# Patient Record
Sex: MALE | Race: WHITE | NOT HISPANIC OR LATINO | Employment: OTHER | URBAN - NONMETROPOLITAN AREA
[De-identification: names, ages, dates, MRNs, and addresses within clinical notes are randomized per-mention and may not be internally consistent; named-entity substitution may affect disease eponyms.]

---

## 2021-01-01 ENCOUNTER — HOSPITAL ENCOUNTER (OUTPATIENT)
Dept: RADIATION ONCOLOGY | Facility: HOSPITAL | Age: 61
Setting detail: RADIATION/ONCOLOGY SERIES
Discharge: HOME | End: 2021-07-02

## 2021-01-01 ENCOUNTER — HOSPITAL ENCOUNTER (OUTPATIENT)
Dept: RADIATION ONCOLOGY | Facility: HOSPITAL | Age: 61
Setting detail: RADIATION/ONCOLOGY SERIES
Discharge: HOME | End: 2021-07-09

## 2021-01-01 ENCOUNTER — TELEPHONE (OUTPATIENT)
Dept: RADIATION ONCOLOGY | Facility: HOSPITAL | Age: 61
End: 2021-01-01

## 2021-01-01 ENCOUNTER — HOSPITAL ENCOUNTER (OUTPATIENT)
Dept: RADIATION ONCOLOGY | Facility: HOSPITAL | Age: 61
Setting detail: RADIATION/ONCOLOGY SERIES
Discharge: HOME | End: 2021-07-07

## 2021-01-01 ENCOUNTER — HOSPITAL ENCOUNTER (OUTPATIENT)
Dept: RADIATION ONCOLOGY | Facility: HOSPITAL | Age: 61
Setting detail: RADIATION/ONCOLOGY SERIES
Discharge: HOME | End: 2021-07-08

## 2021-01-01 ENCOUNTER — HOSPITAL ENCOUNTER (OUTPATIENT)
Dept: RADIATION ONCOLOGY | Facility: HOSPITAL | Age: 61
Setting detail: RADIATION/ONCOLOGY SERIES
Discharge: HOME | End: 2021-06-30

## 2021-01-01 ENCOUNTER — APPOINTMENT (OUTPATIENT)
Dept: RADIATION ONCOLOGY | Facility: HOSPITAL | Age: 61
End: 2021-01-01

## 2021-01-01 ENCOUNTER — CONSULT (OUTPATIENT)
Dept: RADIATION ONCOLOGY | Facility: HOSPITAL | Age: 61
End: 2021-01-01

## 2021-01-01 ENCOUNTER — HOSPITAL ENCOUNTER (OUTPATIENT)
Dept: RADIATION ONCOLOGY | Facility: HOSPITAL | Age: 61
Setting detail: RADIATION/ONCOLOGY SERIES
Discharge: HOME | End: 2021-06-24

## 2021-01-01 ENCOUNTER — HOSPITAL ENCOUNTER (OUTPATIENT)
Dept: RADIATION ONCOLOGY | Facility: HOSPITAL | Age: 61
Setting detail: RADIATION/ONCOLOGY SERIES
Discharge: HOME | End: 2021-07-13

## 2021-01-01 ENCOUNTER — DOCUMENTATION (OUTPATIENT)
Dept: RADIATION ONCOLOGY | Facility: HOSPITAL | Age: 61
End: 2021-01-01

## 2021-01-01 ENCOUNTER — HOSPITAL ENCOUNTER (OUTPATIENT)
Dept: RADIATION ONCOLOGY | Facility: HOSPITAL | Age: 61
Setting detail: RADIATION/ONCOLOGY SERIES
Discharge: HOME | End: 2021-06-07

## 2021-01-01 ENCOUNTER — HOSPITAL ENCOUNTER (OUTPATIENT)
Dept: RADIATION ONCOLOGY | Facility: HOSPITAL | Age: 61
Setting detail: RADIATION/ONCOLOGY SERIES
Discharge: HOME | End: 2021-07-06

## 2021-01-01 ENCOUNTER — HOSPITAL ENCOUNTER (OUTPATIENT)
Dept: RADIATION ONCOLOGY | Facility: HOSPITAL | Age: 61
Setting detail: RADIATION/ONCOLOGY SERIES
End: 2021-01-01

## 2021-01-01 ENCOUNTER — HOSPITAL ENCOUNTER (OUTPATIENT)
Dept: RADIATION ONCOLOGY | Facility: HOSPITAL | Age: 61
Setting detail: RADIATION/ONCOLOGY SERIES
Discharge: HOME | End: 2021-06-29

## 2021-01-01 ENCOUNTER — HOSPITAL ENCOUNTER (OUTPATIENT)
Dept: RADIATION ONCOLOGY | Facility: HOSPITAL | Age: 61
Setting detail: RADIATION/ONCOLOGY SERIES
Discharge: HOME | End: 2021-06-28

## 2021-01-01 ENCOUNTER — HOSPITAL ENCOUNTER (OUTPATIENT)
Dept: RADIATION ONCOLOGY | Facility: HOSPITAL | Age: 61
Setting detail: RADIATION/ONCOLOGY SERIES
Discharge: HOME | End: 2021-07-14

## 2021-01-01 VITALS
OXYGEN SATURATION: 93 % | DIASTOLIC BLOOD PRESSURE: 94 MMHG | WEIGHT: 205 LBS | SYSTOLIC BLOOD PRESSURE: 141 MMHG | BODY MASS INDEX: 27.17 KG/M2 | HEIGHT: 73 IN | RESPIRATION RATE: 18 BRPM | HEART RATE: 104 BPM

## 2021-01-01 DIAGNOSIS — C18.9 PRIMARY COLON CANCER WITH METASTASIS TO OTHER SITE (HCC): ICD-10-CM

## 2021-01-01 DIAGNOSIS — R06.02 SHORTNESS OF BREATH: ICD-10-CM

## 2021-01-01 DIAGNOSIS — R04.2 HEMOPTYSIS: ICD-10-CM

## 2021-01-01 DIAGNOSIS — C78.01 SECONDARY ADENOCARCINOMA OF LUNG, RIGHT (HCC): Primary | ICD-10-CM

## 2021-01-01 DIAGNOSIS — Z79.899 ON ANTINEOPLASTIC CHEMOTHERAPY: ICD-10-CM

## 2021-01-01 PROCEDURE — 77386: CPT | Performed by: RADIOLOGY

## 2021-01-01 PROCEDURE — 77290 THER RAD SIMULAJ FIELD CPLX: CPT | Performed by: RADIOLOGY

## 2021-01-01 PROCEDURE — 77300 RADIATION THERAPY DOSE PLAN: CPT | Performed by: RADIOLOGY

## 2021-01-01 PROCEDURE — 77334 RADIATION TREATMENT AID(S): CPT | Performed by: RADIOLOGY

## 2021-01-01 PROCEDURE — 77338 DESIGN MLC DEVICE FOR IMRT: CPT | Performed by: RADIOLOGY

## 2021-01-01 PROCEDURE — 77336 RADIATION PHYSICS CONSULT: CPT | Performed by: RADIOLOGY

## 2021-01-01 PROCEDURE — 77301 RADIOTHERAPY DOSE PLAN IMRT: CPT | Performed by: RADIOLOGY

## 2021-01-01 RX ORDER — FOLIC ACID 1 MG/1
1000 TABLET ORAL DAILY
COMMUNITY
Start: 2021-01-01

## 2021-01-01 RX ORDER — PROCHLORPERAZINE MALEATE 10 MG
10 TABLET ORAL
COMMUNITY

## 2021-01-01 RX ORDER — AMOXICILLIN AND CLAVULANATE POTASSIUM 500; 125 MG/1; MG/1
1 TABLET, FILM COATED ORAL 3 TIMES DAILY
COMMUNITY
Start: 2021-01-01 | End: 2021-01-01

## 2021-01-01 RX ORDER — HYDROCODONE BITARTRATE AND ACETAMINOPHEN 5; 325 MG/1; MG/1
1 TABLET ORAL EVERY 4 HOURS PRN
COMMUNITY
Start: 2021-01-01

## 2021-01-01 RX ORDER — HYDROCHLOROTHIAZIDE 25 MG/1
1 TABLET ORAL DAILY
COMMUNITY
Start: 2021-01-01

## 2021-01-01 RX ORDER — PEN NEEDLE, DIABETIC 32GX 5/32"
NEEDLE, DISPOSABLE MISCELLANEOUS
COMMUNITY
Start: 2021-01-01

## 2021-01-01 RX ORDER — INSULIN GLARGINE 100 [IU]/ML
32 INJECTION, SOLUTION SUBCUTANEOUS DAILY
COMMUNITY
Start: 2021-01-01

## 2021-01-01 RX ORDER — ALBUTEROL SULFATE 90 UG/1
2 AEROSOL, METERED RESPIRATORY (INHALATION)
COMMUNITY
Start: 2021-01-01

## 2021-01-01 RX ORDER — IPRATROPIUM BROMIDE AND ALBUTEROL SULFATE 2.5; .5 MG/3ML; MG/3ML
3 SOLUTION RESPIRATORY (INHALATION) EVERY 4 HOURS PRN
COMMUNITY
Start: 2021-01-01 | End: 2021-01-01

## 2021-05-30 ENCOUNTER — APPOINTMENT (OUTPATIENT)
Dept: GENERAL RADIOLOGY | Age: 61
DRG: 194 | End: 2021-05-30
Payer: MEDICARE

## 2021-05-30 ENCOUNTER — APPOINTMENT (OUTPATIENT)
Dept: CT IMAGING | Age: 61
DRG: 194 | End: 2021-05-30
Payer: MEDICARE

## 2021-05-30 ENCOUNTER — HOSPITAL ENCOUNTER (INPATIENT)
Age: 61
LOS: 4 days | Discharge: HOME HEALTH | DRG: 194 | End: 2021-06-03
Attending: EMERGENCY MEDICINE | Admitting: STUDENT IN AN ORGANIZED HEALTH CARE EDUCATION/TRAINING PROGRAM
Payer: MEDICARE

## 2021-05-30 DIAGNOSIS — J18.9 PNEUMONIA DUE TO ORGANISM: ICD-10-CM

## 2021-05-30 DIAGNOSIS — C34.90 MALIGNANT NEOPLASM OF LUNG, UNSPECIFIED LATERALITY, UNSPECIFIED PART OF LUNG (HCC): ICD-10-CM

## 2021-05-30 DIAGNOSIS — C18.9 PRIMARY COLON CANCER WITH METASTASIS TO OTHER SITE (HCC): Primary | ICD-10-CM

## 2021-05-30 DIAGNOSIS — Z51.5 PALLIATIVE CARE PATIENT: ICD-10-CM

## 2021-05-30 PROBLEM — E11.9 DIABETES (HCC): Status: ACTIVE | Noted: 2021-05-30

## 2021-05-30 PROBLEM — Z91.89 COMMUNITY ACQUIRED PNEUMONIA WITH RISK FACTOR FOR DRUG-RESISTANT ORGANISM: Status: ACTIVE | Noted: 2021-05-30

## 2021-05-30 PROBLEM — C78.00 COLON CANCER METASTASIZED TO LUNG (HCC): Status: ACTIVE | Noted: 2021-05-30

## 2021-05-30 PROBLEM — T17.908S POST-OBSTRUCTIVE PNEUMONIA DUE TO FOREIGN BODY ASPIRATION: Status: ACTIVE | Noted: 2021-05-30

## 2021-05-30 PROBLEM — J16.0 COMMUNITY ACQUIRED PNEUMONIA DUE TO CHLAMYDIA SPECIES: Status: ACTIVE | Noted: 2021-05-30

## 2021-05-30 LAB
ADENOVIRUS BY PCR: NOT DETECTED
ALBUMIN SERPL-MCNC: 3.7 G/DL (ref 3.5–5.2)
ALP BLD-CCNC: 114 U/L (ref 40–130)
ALT SERPL-CCNC: 8 U/L (ref 5–41)
ANION GAP SERPL CALCULATED.3IONS-SCNC: 11 MMOL/L (ref 7–19)
AST SERPL-CCNC: 10 U/L (ref 5–40)
BASE EXCESS ARTERIAL: -0.5 MMOL/L (ref -2–2)
BASOPHILS ABSOLUTE: 0 K/UL (ref 0–0.2)
BASOPHILS RELATIVE PERCENT: 0.4 % (ref 0–1)
BILIRUB SERPL-MCNC: 1 MG/DL (ref 0.2–1.2)
BORDETELLA PARAPERTUSSIS BY PCR: NOT DETECTED
BORDETELLA PERTUSSIS BY PCR: NOT DETECTED
BUN BLDV-MCNC: 8 MG/DL (ref 6–20)
CALCIUM SERPL-MCNC: 8.9 MG/DL (ref 8.6–10)
CARBOXYHEMOGLOBIN ARTERIAL: 1.9 % (ref 0–5)
CHLAMYDOPHILIA PNEUMONIAE BY PCR: NOT DETECTED
CHLORIDE BLD-SCNC: 105 MMOL/L (ref 98–111)
CO2: 24 MMOL/L (ref 22–29)
CORONAVIRUS 229E BY PCR: NOT DETECTED
CORONAVIRUS HKU1 BY PCR: NOT DETECTED
CORONAVIRUS NL63 BY PCR: NOT DETECTED
CORONAVIRUS OC43 BY PCR: NOT DETECTED
CREAT SERPL-MCNC: 0.9 MG/DL (ref 0.5–1.2)
EOSINOPHILS ABSOLUTE: 0.1 K/UL (ref 0–0.6)
EOSINOPHILS RELATIVE PERCENT: 1.3 % (ref 0–5)
GFR AFRICAN AMERICAN: >59
GFR NON-AFRICAN AMERICAN: >60
GLUCOSE BLD-MCNC: 114 MG/DL (ref 74–109)
GLUCOSE BLD-MCNC: 142 MG/DL (ref 70–99)
HCO3 ARTERIAL: 23.2 MMOL/L (ref 22–26)
HCT VFR BLD CALC: 50.8 % (ref 42–52)
HEMOGLOBIN, ART, EXTENDED: 17.5 G/DL (ref 14–18)
HEMOGLOBIN: 16.8 G/DL (ref 14–18)
HUMAN METAPNEUMOVIRUS BY PCR: NOT DETECTED
HUMAN RHINOVIRUS/ENTEROVIRUS BY PCR: NOT DETECTED
IMMATURE GRANULOCYTES #: 0 K/UL
INFLUENZA A BY PCR: NOT DETECTED
INFLUENZA B BY PCR: NOT DETECTED
LACTIC ACID: 1.4 MMOL/L (ref 0.5–1.9)
LYMPHOCYTES ABSOLUTE: 0.4 K/UL (ref 1.1–4.5)
LYMPHOCYTES RELATIVE PERCENT: 7 % (ref 20–40)
MCH RBC QN AUTO: 29.2 PG (ref 27–31)
MCHC RBC AUTO-ENTMCNC: 33.1 G/DL (ref 33–37)
MCV RBC AUTO: 88.2 FL (ref 80–94)
METHEMOGLOBIN ARTERIAL: 0.9 %
MONOCYTES ABSOLUTE: 0.7 K/UL (ref 0–0.9)
MONOCYTES RELATIVE PERCENT: 12.1 % (ref 0–10)
MYCOPLASMA PNEUMONIAE BY PCR: NOT DETECTED
NEUTROPHILS ABSOLUTE: 4.4 K/UL (ref 1.5–7.5)
NEUTROPHILS RELATIVE PERCENT: 78.7 % (ref 50–65)
O2 CONTENT ARTERIAL: 22.4 ML/DL
O2 SAT, ARTERIAL: 91.4 %
O2 THERAPY: ABNORMAL
PARAINFLUENZA VIRUS 1 BY PCR: NOT DETECTED
PARAINFLUENZA VIRUS 2 BY PCR: NOT DETECTED
PARAINFLUENZA VIRUS 3 BY PCR: NOT DETECTED
PARAINFLUENZA VIRUS 4 BY PCR: NOT DETECTED
PCO2 ARTERIAL: 35 MMHG (ref 35–45)
PDW BLD-RTO: 18.2 % (ref 11.5–14.5)
PERFORMED ON: ABNORMAL
PH ARTERIAL: 7.43 (ref 7.35–7.45)
PLATELET # BLD: 183 K/UL (ref 130–400)
PMV BLD AUTO: 9.3 FL (ref 9.4–12.4)
PO2 ARTERIAL: 63 MMHG (ref 80–100)
POTASSIUM SERPL-SCNC: 3.7 MMOL/L (ref 3.5–5)
POTASSIUM, WHOLE BLOOD: 3.6
PRO-BNP: 813 PG/ML (ref 0–900)
RBC # BLD: 5.76 M/UL (ref 4.7–6.1)
RESPIRATORY SYNCYTIAL VIRUS BY PCR: NOT DETECTED
SARS-COV-2, PCR: NOT DETECTED
SODIUM BLD-SCNC: 140 MMOL/L (ref 136–145)
TOTAL PROTEIN: 6.5 G/DL (ref 6.6–8.7)
TROPONIN: <0.01 NG/ML (ref 0–0.03)
WBC # BLD: 5.6 K/UL (ref 4.8–10.8)

## 2021-05-30 PROCEDURE — 2580000003 HC RX 258: Performed by: EMERGENCY MEDICINE

## 2021-05-30 PROCEDURE — 71045 X-RAY EXAM CHEST 1 VIEW: CPT

## 2021-05-30 PROCEDURE — 85025 COMPLETE CBC W/AUTO DIFF WBC: CPT

## 2021-05-30 PROCEDURE — 71275 CT ANGIOGRAPHY CHEST: CPT

## 2021-05-30 PROCEDURE — 6370000000 HC RX 637 (ALT 250 FOR IP): Performed by: STUDENT IN AN ORGANIZED HEALTH CARE EDUCATION/TRAINING PROGRAM

## 2021-05-30 PROCEDURE — 2580000003 HC RX 258: Performed by: STUDENT IN AN ORGANIZED HEALTH CARE EDUCATION/TRAINING PROGRAM

## 2021-05-30 PROCEDURE — 0202U NFCT DS 22 TRGT SARS-COV-2: CPT

## 2021-05-30 PROCEDURE — 94640 AIRWAY INHALATION TREATMENT: CPT

## 2021-05-30 PROCEDURE — 80053 COMPREHEN METABOLIC PANEL: CPT

## 2021-05-30 PROCEDURE — 6360000002 HC RX W HCPCS: Performed by: STUDENT IN AN ORGANIZED HEALTH CARE EDUCATION/TRAINING PROGRAM

## 2021-05-30 PROCEDURE — 36600 WITHDRAWAL OF ARTERIAL BLOOD: CPT

## 2021-05-30 PROCEDURE — 6360000004 HC RX CONTRAST MEDICATION: Performed by: EMERGENCY MEDICINE

## 2021-05-30 PROCEDURE — 83605 ASSAY OF LACTIC ACID: CPT

## 2021-05-30 PROCEDURE — 87040 BLOOD CULTURE FOR BACTERIA: CPT

## 2021-05-30 PROCEDURE — 83880 ASSAY OF NATRIURETIC PEPTIDE: CPT

## 2021-05-30 PROCEDURE — 82947 ASSAY GLUCOSE BLOOD QUANT: CPT

## 2021-05-30 PROCEDURE — 82803 BLOOD GASES ANY COMBINATION: CPT

## 2021-05-30 PROCEDURE — 36415 COLL VENOUS BLD VENIPUNCTURE: CPT

## 2021-05-30 PROCEDURE — 6360000002 HC RX W HCPCS: Performed by: EMERGENCY MEDICINE

## 2021-05-30 PROCEDURE — 2700000000 HC OXYGEN THERAPY PER DAY

## 2021-05-30 PROCEDURE — 84132 ASSAY OF SERUM POTASSIUM: CPT

## 2021-05-30 PROCEDURE — 84484 ASSAY OF TROPONIN QUANT: CPT

## 2021-05-30 PROCEDURE — 1210000000 HC MED SURG R&B

## 2021-05-30 PROCEDURE — 99285 EMERGENCY DEPT VISIT HI MDM: CPT

## 2021-05-30 PROCEDURE — 93005 ELECTROCARDIOGRAM TRACING: CPT | Performed by: EMERGENCY MEDICINE

## 2021-05-30 RX ORDER — SODIUM CHLORIDE 9 MG/ML
INJECTION, SOLUTION INTRAVENOUS CONTINUOUS
Status: DISCONTINUED | OUTPATIENT
Start: 2021-05-30 | End: 2021-06-03

## 2021-05-30 RX ORDER — INSULIN GLARGINE 100 [IU]/ML
32 INJECTION, SOLUTION SUBCUTANEOUS NIGHTLY
Status: DISCONTINUED | OUTPATIENT
Start: 2021-05-30 | End: 2021-06-03 | Stop reason: HOSPADM

## 2021-05-30 RX ORDER — POTASSIUM CHLORIDE 20 MEQ/1
40 TABLET, EXTENDED RELEASE ORAL PRN
Status: DISCONTINUED | OUTPATIENT
Start: 2021-05-30 | End: 2021-06-03 | Stop reason: HOSPADM

## 2021-05-30 RX ORDER — SODIUM CHLORIDE 0.9 % (FLUSH) 0.9 %
5-40 SYRINGE (ML) INJECTION EVERY 12 HOURS SCHEDULED
Status: DISCONTINUED | OUTPATIENT
Start: 2021-05-30 | End: 2021-06-03 | Stop reason: HOSPADM

## 2021-05-30 RX ORDER — SODIUM CHLORIDE 9 MG/ML
25 INJECTION, SOLUTION INTRAVENOUS PRN
Status: DISCONTINUED | OUTPATIENT
Start: 2021-05-30 | End: 2021-06-03 | Stop reason: HOSPADM

## 2021-05-30 RX ORDER — AZITHROMYCIN 250 MG/1
500 TABLET, FILM COATED ORAL DAILY
Status: DISCONTINUED | OUTPATIENT
Start: 2021-05-31 | End: 2021-06-03 | Stop reason: HOSPADM

## 2021-05-30 RX ORDER — ACETAMINOPHEN 325 MG/1
650 TABLET ORAL EVERY 6 HOURS PRN
Status: DISCONTINUED | OUTPATIENT
Start: 2021-05-30 | End: 2021-06-03 | Stop reason: HOSPADM

## 2021-05-30 RX ORDER — SODIUM CHLORIDE 0.9 % (FLUSH) 0.9 %
5-40 SYRINGE (ML) INJECTION PRN
Status: DISCONTINUED | OUTPATIENT
Start: 2021-05-30 | End: 2021-06-03 | Stop reason: HOSPADM

## 2021-05-30 RX ORDER — ACETAMINOPHEN 650 MG/1
650 SUPPOSITORY RECTAL EVERY 6 HOURS PRN
Status: DISCONTINUED | OUTPATIENT
Start: 2021-05-30 | End: 2021-06-03 | Stop reason: HOSPADM

## 2021-05-30 RX ORDER — IPRATROPIUM BROMIDE AND ALBUTEROL SULFATE 2.5; .5 MG/3ML; MG/3ML
1 SOLUTION RESPIRATORY (INHALATION)
Status: DISCONTINUED | OUTPATIENT
Start: 2021-05-30 | End: 2021-06-03 | Stop reason: HOSPADM

## 2021-05-30 RX ORDER — POTASSIUM CHLORIDE 7.45 MG/ML
10 INJECTION INTRAVENOUS PRN
Status: DISCONTINUED | OUTPATIENT
Start: 2021-05-30 | End: 2021-06-03 | Stop reason: HOSPADM

## 2021-05-30 RX ORDER — MAGNESIUM SULFATE IN WATER 40 MG/ML
2000 INJECTION, SOLUTION INTRAVENOUS PRN
Status: DISCONTINUED | OUTPATIENT
Start: 2021-05-30 | End: 2021-06-03 | Stop reason: HOSPADM

## 2021-05-30 RX ORDER — HYDROCHLOROTHIAZIDE 25 MG/1
25 TABLET ORAL DAILY
COMMUNITY
End: 2021-09-17

## 2021-05-30 RX ORDER — PROCHLORPERAZINE MALEATE 10 MG
10 TABLET ORAL EVERY 6 HOURS PRN
COMMUNITY

## 2021-05-30 RX ORDER — ONDANSETRON 2 MG/ML
4 INJECTION INTRAMUSCULAR; INTRAVENOUS EVERY 6 HOURS PRN
Status: DISCONTINUED | OUTPATIENT
Start: 2021-05-30 | End: 2021-06-03 | Stop reason: HOSPADM

## 2021-05-30 RX ORDER — POLYETHYLENE GLYCOL 3350 17 G/17G
17 POWDER, FOR SOLUTION ORAL DAILY PRN
Status: DISCONTINUED | OUTPATIENT
Start: 2021-05-30 | End: 2021-06-03 | Stop reason: HOSPADM

## 2021-05-30 RX ORDER — PROCHLORPERAZINE MALEATE 10 MG
10 TABLET ORAL EVERY 6 HOURS PRN
Status: DISCONTINUED | OUTPATIENT
Start: 2021-05-30 | End: 2021-06-03 | Stop reason: HOSPADM

## 2021-05-30 RX ORDER — PROMETHAZINE HYDROCHLORIDE 25 MG/1
12.5 TABLET ORAL EVERY 6 HOURS PRN
Status: DISCONTINUED | OUTPATIENT
Start: 2021-05-30 | End: 2021-06-03 | Stop reason: HOSPADM

## 2021-05-30 RX ORDER — PREDNISONE 20 MG/1
TABLET ORAL
Status: ON HOLD | COMMUNITY
Start: 2021-04-29 | End: 2021-05-30

## 2021-05-30 RX ORDER — ALBUTEROL SULFATE 90 UG/1
2 AEROSOL, METERED RESPIRATORY (INHALATION) EVERY 6 HOURS PRN
COMMUNITY

## 2021-05-30 RX ADMIN — ONDANSETRON 4 MG: 2 INJECTION INTRAMUSCULAR; INTRAVENOUS at 16:25

## 2021-05-30 RX ADMIN — ACETAMINOPHEN 650 MG: 325 TABLET ORAL at 21:22

## 2021-05-30 RX ADMIN — Medication 500 MG: at 15:42

## 2021-05-30 RX ADMIN — INSULIN GLARGINE 12 UNITS: 100 INJECTION, SOLUTION SUBCUTANEOUS at 21:15

## 2021-05-30 RX ADMIN — SODIUM CHLORIDE: 9 INJECTION, SOLUTION INTRAVENOUS at 16:40

## 2021-05-30 RX ADMIN — SODIUM CHLORIDE, PRESERVATIVE FREE 10 ML: 5 INJECTION INTRAVENOUS at 21:19

## 2021-05-30 RX ADMIN — IOPAMIDOL 90 ML: 755 INJECTION, SOLUTION INTRAVENOUS at 13:44

## 2021-05-30 RX ADMIN — IPRATROPIUM BROMIDE AND ALBUTEROL SULFATE 1 AMPULE: .5; 3 SOLUTION RESPIRATORY (INHALATION) at 19:30

## 2021-05-30 RX ADMIN — CEFTRIAXONE 1000 MG: 1 INJECTION, POWDER, FOR SOLUTION INTRAMUSCULAR; INTRAVENOUS at 15:41

## 2021-05-30 ASSESSMENT — ENCOUNTER SYMPTOMS
VOMITING: 0
SHORTNESS OF BREATH: 1
SORE THROAT: 0
BACK PAIN: 0
DIARRHEA: 0
COUGH: 1
NAUSEA: 0
ABDOMINAL PAIN: 0
RHINORRHEA: 0

## 2021-05-30 ASSESSMENT — PAIN DESCRIPTION - LOCATION: LOCATION: RIB CAGE

## 2021-05-30 ASSESSMENT — PAIN DESCRIPTION - PAIN TYPE: TYPE: ACUTE PAIN

## 2021-05-30 ASSESSMENT — PAIN SCALES - GENERAL
PAINLEVEL_OUTOF10: 3
PAINLEVEL_OUTOF10: 0
PAINLEVEL_OUTOF10: 3

## 2021-05-30 NOTE — ED PROVIDER NOTES
[facility] EMERGENCY DEPT  eMERGENCY dEPARTMENT eNCOUnter      Pt Name: Ozzie Swan  MRN: [id number]  [place] 1960  Date of evaluation: 5/30/2021  Provider: Sebastián Dawson MD    [address] [place] [address]       Chief Complaint   Patient presents with    Shortness of Breath     patient states that he has been occasionally cough up blood tinged sputum. He states that he has noted increased work of breathing with exertion. Denies any fever         HISTORY OF PRESENT ILLNESS   (Location/Symptom, Timing/Onset,Context/Setting, Quality, Duration, Modifying Factors, Severity)  Note limiting factors. Ozzie Swan is a 64 y.o. male who presents to the emergency department for increasing shortness of breath over the past month and a half. Productive cough with green sputum occasionally has small amt of blood mixed in. He has known colon cancer and had chemotherapy 2 weeks ago in [place] [address]. States he is here visiting some family is why presented to our emergency department. He denies any chest pain. States he is tried some over-the-counter treatments without any relief but has not been on any steroids or antibiotics. No leg swelling. Former smoker quit 3 months ago. HPI    NursingNotes were reviewed. REVIEW OF SYSTEMS    (2-9 systems for level 4, 10 or more for level 5)     Review of Systems   Constitutional: Positive for fatigue. Negative for chills and fever. HENT: Negative for rhinorrhea and sore throat. Respiratory: Positive for cough and shortness of breath. Cardiovascular: Negative for chest pain and leg swelling. Gastrointestinal: Negative for abdominal pain, diarrhea, nausea and vomiting. Genitourinary: Negative for dysuria and frequency. Musculoskeletal: Negative for back pain and neck pain. Neurological: Negative for dizziness and headaches. All other systems reviewed and are negative.            PAST MEDICALHISTORY     Past Medical History:   Diagnosis Date    Cancer [facility])     colon  Diabetes mellitus ([facility] [address]          SURGICAL HISTORY       Past Surgical History:   Procedure Laterality Date    ABDOMEN SURGERY      TUNNELED VENOUS PORT PLACEMENT           CURRENT MEDICATIONS     Previous Medications    INSULIN GLARGINE (LANTUS;BASAGLAR) 100 UNIT/ML INJECTION PEN    Inject 32 Units into the skin daily    INSULIN LISPRO (HUMALOG KWIKPEN U-200) 200 UNIT/ML SOPN PEN    Inject 8 Units into the skin 3 times daily as needed    PREDNISONE (DELTASONE) 20 MG TABLET    Has not taken for one month    PROCHLORPERAZINE (COMPAZINE) 10 MG TABLET    Take 10 mg by mouth every 6 hours as needed       ALLERGIES     Varenicline    FAMILY HISTORY     History reviewed. No pertinent family history. SOCIAL HISTORY       Social History     Socioeconomic History    Marital status: Single     Spouse name: None    Number of children: None    Years of education: None    Highest education level: None   Occupational History    None   Tobacco Use    Smoking status: Current Every Day Smoker     Types: Cigarettes    Smokeless tobacco: Never Used   Substance and Sexual Activity    Alcohol use: Not Currently    Drug use: Not Currently    Sexual activity: None   Other Topics Concern    None   Social History Narrative    None     Social Determinants of Health     Financial Resource Strain:     Difficulty of Paying Living Expenses:    Food Insecurity:     Worried About Running Out of Food in the Last Year:     Ran Out of Food in the Last Year:    Transportation Needs:     Lack of Transportation (Medical):      Lack of Transportation (Non-Medical):    Physical Activity:     Days of Exercise per Week:     Minutes of Exercise per Session:    Stress:     Feeling of Stress :    Social Connections:     Frequency of Communication with Friends and Family:     Frequency of Social Gatherings with Friends and Family:     Attends Ten[employer] Services:     Active Member of Clubs or Organizations:     Attends Club or Organization Meetings:     Marital Status:    Intimate Partner Violence:     Fear of Current or Ex-Partner:     Emotionally Abused:     Physically Abused:     Sexually Abused:        SCREENINGS    [place] Coma Scale  Eye Opening: Spontaneous  Best Verbal Response: Oriented  Best Motor Response: Obeys commands  [place] Coma Scale Score: 15        PHYSICAL EXAM    (up to 7 for level 4, 8 or more for level 5)     ED Triage Vitals [05/30/21 1144]   BP Temp Temp Source Pulse Resp SpO2 Height Weight   (!) 139/97 97.9 °F (36.6 °C) Oral 99 22 90 % 6' 1\" (1.854 m) 204 lb (92.5 kg)       Physical Exam  Vitals and nursing note reviewed. Constitutional:       General: He is not in acute distress. Appearance: Normal appearance. He is well-developed. He is not ill-appearing, toxic-appearing or diaphoretic. HENT:      Head: Normocephalic and atraumatic. Nose: Nose normal.      Mouth/Throat:      Mouth: Mucous membranes are moist.   Eyes:      Conjunctiva/sclera: Conjunctivae normal.   Neck:      Trachea: No tracheal deviation. Cardiovascular:      Rate and Rhythm: Normal rate and regular rhythm. Pulses: Normal pulses. Heart sounds: Normal heart sounds. No murmur heard. Pulmonary:      Breath sounds: Examination of the right-middle field reveals decreased breath sounds. Examination of the left-middle field reveals decreased breath sounds. Examination of the right-lower field reveals decreased breath sounds. Examination of the left-lower field reveals decreased breath sounds. Decreased breath sounds present. No wheezing or rales. Abdominal:      Palpations: Abdomen is soft. There is no mass. Tenderness: There is no abdominal tenderness. Musculoskeletal:         General: Normal range of motion. Cervical back: Normal range of motion and neck supple. Right lower leg: No edema. Left lower leg: No edema. Skin:     General: Skin is warm and dry.    Neurological:      Mental Status: He is alert and oriented to person, place, and time. DIAGNOSTIC RESULTS     EKG: All EKG's areinterpreted by the Emergency Department Physician who either signs or Co-signs this chart in the absence of a cardiologist.    98 normal sinus rhythm no obvious ST changes nondiagnostic EKG    RADIOLOGY:  Non-plain film images such as CT, Ultrasound and MRI are read by the radiologist. Plain radiographic images are visualized and preliminarily interpreted bythe emergency physician with the below findings:      CTA PULMONARY W CONTRAST   Final Result   1. No evidence of pulmonary embolus. 2.  Extensive metastatic disease in the chest and upper abdomen. 6.8   cm RIGHT hilar/upper lobe mass with extensive vascular and airway   encasement and direct mediastinal invasion with encasement and direct   invasion of the RIGHT mainstem bronchus and bronchus intermedius. RIGHT lower lobe and middle lobe tree-in-bud nodularity is present   with differential including endobronchial spread of neoplasm and   postobstructive pneumonia. There is also a 5.5 cm mass in the   subpleural LEFT lower lobe as well as an 11 mm LEFT lower lobe   pulmonary nodule. Mediastinal and bilateral hilar lymphadenopathy is   present. In the upper abdomen, at least one liver metastasis and   bilateral adrenal gland masses are present. Suspected tumor thrombus   in the IVC adjacent to the RIGHT adrenal gland. Signed by Dr Pete Hightower on 5/30/2021 3:22 PM      XR CHEST PORTABLE   Final Result   1. No evidence of consolidation to suggest lobar pneumonia. 2.  RIGHT upper lobe and LEFT lower lobe lung masses, likely   representing metastasis related to history of colon cancer. Correlate   with clinical history and outside imaging (patient reportedly under   treatment for colon cancer in [place] [address]).    Signed by Dr Pete Hightower on 5/30/2021 1:27 PM              LABS:  Labs Reviewed   BLOOD GAS, ARTERIAL - Abnormal; Notable for the following components:       Result Value    pO2, Arterial 63.0 (*)     All other components within normal limits   COMPREHENSIVE METABOLIC PANEL - Abnormal; Notable for the following components:    Glucose 114 (*)     Total Protein 6.5 (*)     All other components within normal limits   CBC WITH AUTO DIFFERENTIAL - Abnormal; Notable for the following components:    RDW 18.2 (*)     MPV 9.3 (*)     Neutrophils % 78.7 (*)     Lymphocytes % 7.0 (*)     Monocytes % 12.1 (*)     Lymphocytes Absolute 0.4 (*)     All other components within normal limits   RESPIRATORY PANEL, MOLECULAR, WITH COVID-19   CULTURE, BLOOD 1   CULTURE, BLOOD 2   LACTIC ACID, PLASMA   POTASSIUM, WHOLE BLOOD   TROPONIN   BRAIN NATRIURETIC PEPTIDE   POCT GLUCOSE   POCT GLUCOSE       All other labs were within normal range or not returned as of this dictation. EMERGENCY DEPARTMENT COURSE and DIFFERENTIAL DIAGNOSIS/MDM:   Vitals:    Vitals:    05/30/21 1221 05/30/21 1305 05/30/21 1427 05/30/21 1543   BP: 109/80 113/87 (!) 132/94    Pulse: 107 103 99 107   Resp: 17 23 30 30   Temp:       TempSrc:       SpO2: 91% 91% (!) 89%    Weight:       Height:           MDM  Number of Diagnoses or Management Options     Amount and/or Complexity of Data Reviewed  Clinical lab tests: ordered and reviewed  Tests in the radiology section of CPT®: ordered and reviewed  Independent visualization of images, tracings, or specimens: yes      VSS, pt in NAD, no home 02, sat 88-90 here on RA, former smoker, ?lung masses on xray, pt tells me had radiation to these within past couple years, has colon CA and on chemo, cxr otherwise neg, will cta to further eval 1327    Unfortunately found to have extensive lung disease, could be mets or addtl primary, liver and adrenal mets,?thrombus in IVC, ? post obstructive pneumonia, will cover with antibx, pt wanting to establish care here, oncology consulted, d/w Dr. Russel Cao for admission      CONSULTS:  IP CONSULT TO ONCOLOGY  IP CONSULT TO PALLIATIVE CARE    PROCEDURES:  Unless otherwise noted below, none     Procedures    FINAL IMPRESSION      1. Primary colon cancer with metastasis to other site [facility])    2. Malignant neoplasm of lung, unspecified laterality, unspecified part of lung ([facility] [address]    3. Pneumonia due to organism          DISPOSITION/PLAN   DISPOSITION Admitted 05/30/2021 03:29:59 PM      PATIENT REFERRED TO:  No follow-up provider specified.     DISCHARGE MEDICATIONS:  New Prescriptions    No medications on file          (Please note that portions of this note were completed with a voice recognition program.  Efforts were made to edit thedictations but occasionally words are mis-transcribed.)    Oletha Kayser, MD (electronically signed)  Attending Emergency Physician        Trae Rico MD  05/30/21 5414

## 2021-05-30 NOTE — Clinical Note
Patient Class: Inpatient [101]   REQUIRED: Diagnosis: Community acquired pneumonia with risk factor for drug-resistant organism [[id number]]   Estimated Length of Stay: Estimated stay of more than 2 midnights   Admitting Provider: Awilda Gamez [[id number]]

## 2021-05-30 NOTE — ED NOTES
Report called to [facility] on 5th floor      Ronak Serrano RN  05/30/21 5449

## 2021-05-30 NOTE — H&P
Hospitalist: History and Physical    Date: 2021 Time: 3:39 PM    Name: Calvin Taylor : 1960 MRN: [id number]    Code Status: No Order No additional code details    PCP: Lacinda Bloch, MD, MD    Patient's Chief Complaint Is: Shortness of breath    HPI: Patient is a 64 y.o. male with diabetes mellitus, metastatic colon cancer followed by oncology in [facility] on chemotherapy every 2 weeks, history of surgical resection and radiation therapy, recently moved to [place], presented to ED with progressive dyspnea and cough over the past 2 months with subsequent severe worsening symptoms earlier this a.m. He has had intermittently productive sputum with mucus and occasional scant hemoptysis. He denies any fever/chills, chest pain, palpitations, abdominal pain. He has not noticed any recent weight loss. Work-up in the ED significant for CTA chest which was negative for PE, but showed extensive metastatic disease involving lungs with an upper lobe mass with invasion into the mediastinum and invasion into the right mainstem bronchus, as well as tree-in-bud nodularity in RML and RLL concerning for postobstructive pneumonia. There is also apparent metastatic disease involving the liver and adrenal glands, as well as a possible tumor thrombus in IVC adjacent to the right adrenal gland. He tells me that his last chemotherapy session was almost 2 weeks ago, with next scheduled session scheduled for 2021. Past Medical History:   Diagnosis Date    Cancer [facility])     colon    Diabetes mellitus ([facility] [address]      Past Surgical History:   Procedure Laterality Date    ABDOMEN SURGERY      TUNNELED VENOUS PORT PLACEMENT       History reviewed. No pertinent family history.   Social History     Socioeconomic History    Marital status: Single     Spouse name: Not on file    Number of children: Not on file    Years of education: Not on file    Highest education level: Not on file   Occupational History  Not on file   Tobacco Use    Smoking status: Current Every Day Smoker     Types: Cigarettes    Smokeless tobacco: Never Used   Substance and Sexual Activity    Alcohol use: Not Currently    Drug use: Not Currently    Sexual activity: Not on file   Other Topics Concern    Not on file   Social History Narrative    Not on file     Social Determinants of Health     Financial Resource Strain:     Difficulty of Paying Living Expenses:    Food Insecurity:     Worried About Running Out of Food in the Last Year:     [address] in the Last Year:    Transportation Needs:     Lack of Transportation (Medical):  Lack of Transportation (Non-Medical):    Physical Activity:     Days of Exercise per Week:     Minutes of Exercise per Session:    Stress:     Feeling of Stress :    Social Connections:     Frequency of Communication with Friends and Family:     Frequency of Social Gatherings with Friends and Family:     Attends Hindu Services:     Active Member of Clubs or Organizations:     Attends Club or Organization Meetings:     Marital Status:    Intimate Partner Violence:     Fear of Current or Ex-Partner:     Emotionally Abused:     Physically Abused:     Sexually Abused: Allergies   Allergen Reactions    Varenicline      Other reaction(s): Anger and Agression   -      Prior to Admission medications    Medication Sig Start Date End Date Taking?  Authorizing Provider   insulin glargine (LANTUS;BASAGLAR) 100 UNIT/ML injection pen Inject 32 Units into the skin daily 4/29/21  Yes Historical Provider, MD   insulin lispro (HUMALOG KWIKPEN U-200) 200 UNIT/ML SOPN pen Inject 8 Units into the skin 3 times daily as needed 4/23/21  Yes Historical Provider, MD   prochlorperazine (COMPAZINE) 10 MG tablet Take 10 mg by mouth every 6 hours as needed    Historical Provider, MD   predniSONE (DELTASONE) 20 MG tablet Has not taken for one month 4/29/21   Historical Provider, MD       I have reviewed all pertinent history. Prior medical records and laboratory evaluation reviewed. Imaging independently reviewed. Review of Systems:   As per HPI, otherwise all other ROS performed and found to be negative at this time. Physical Exam:  Vitals:    05/30/21 1427   BP: (!) 132/94   Pulse: 99   Resp: 30   Temp:    SpO2: (!) 89%     CONSTITUTIONAL: Chronically ill-appearing, no apparent distress  PSYCH: Judgement and insight are normal. Mental status alert and oriented to person, place and time. Mood and affect are normal  EYES: ENE, symmetrical. Conjunctiva are moist, non-icteric. Lids are normal  ENT: Oropharynx clear  NECK: Trachea is midline. Nontender  Head: Normocephalic, atraumatic  LUNGS: Diminished breath sounds of RLL  CARDIOVASCULAR: Mildly tachycardic with regular rhythm, peripheral pulses equal  GI/ABDOMEN: Soft, non-tender, non-distended, no guarding or rebound. Bowel sounds are normal.  SKIN: Warm and dry.    NEUROLOGICAL: No focal neurologic deficits  EXTREMITIES: No cyanosis or edema    Labs:   Recent Results (from the past 72 hour(s))   Blood Gas, Arterial    Collection Time: 05/30/21 12:34 PM   Result Value Ref Range    pH, Arterial 7.430 7.350 - 7.450    pCO2, Arterial 35.0 35.0 - 45.0 mmHg    pO2, Arterial 63.0 (L) 80.0 - 100.0 mmHg    HCO3, Arterial 23.2 22.0 - 26.0 mmol/L    Base Excess, Arterial -0.5 -2.0 - 2.0 mmol/L    Hemoglobin, Art, Extended 17.5 14.0 - 18.0 g/dL    O2 Sat, Arterial 91.4 >92 %    Carboxyhgb, Arterial 1.9 0.0 - 5.0 %    Methemoglobin, Arterial 0.9 <1.5 %    O2 Content, Arterial 22.4 Not Established mL/dL    O2 Therapy Unknown    Potassium, Whole Blood    Collection Time: 05/30/21 12:34 PM   Result Value Ref Range    Potassium, Whole Blood 3.6    Lactic Acid, Plasma    Collection Time: 05/30/21 12:40 PM   Result Value Ref Range    Lactic Acid 1.4 0.5 - 1.9 mmol/L   Respiratory Panel, Molecular, with COVID-19 (Restricted: peds pts or suitable admitted adults)    Collection Time: 05/30/21 12:40 PM    Specimen: Nasopharyngeal   Result Value Ref Range    Adenovirus by PCR Not Detected Not Detected    Bordetella parapertussis by PCR Not Detected Not Detected    Bordetella pertussis by PCR Not Detected Not Detected    Chlamydophilia pneumoniae by PCR Not Detected Not Detected    Coronavirus 229E by PCR Not Detected Not Detected    Coronavirus HKU1 by PCR Not Detected Not Detected    Coronavirus NL63 by PCR Not Detected Not Detected    Coronavirus OC43 by PCR Not Detected Not Detected    SARS-CoV-2, PCR Not Detected Not Detected    Human Metapneumovirus by PCR Not Detected Not Detected    Human Rhinovirus/Enterovirus by PCR Not Detected Not Detected    Influenza A by PCR Not Detected Not Detected    Influenza B by PCR Not Detected Not Detected    Mycoplasma pneumoniae by PCR Not Detected Not Detected    Parainfluenza Virus 1 by PCR Not Detected Not Detected    Parainfluenza Virus 2 by PCR Not Detected Not Detected    Parainfluenza Virus 3 by PCR Not Detected Not Detected    Parainfluenza Virus 4 by PCR Not Detected Not Detected    Respiratory Syncytial Virus by PCR Not Detected Not Detected   Comprehensive Metabolic Panel    Collection Time: 05/30/21 12:40 PM   Result Value Ref Range    Sodium 140 136 - 145 mmol/L    Potassium 3.7 3.5 - 5.0 mmol/L    Chloride 105 98 - 111 mmol/L    CO2 24 22 - 29 mmol/L    Anion Gap 11 7 - 19 mmol/L    Glucose 114 (H) 74 - 109 mg/dL    BUN 8 6 - 20 mg/dL    CREATININE 0.9 0.5 - 1.2 mg/dL    GFR Non-African American >60 >60    GFR African American >59 >59    Calcium 8.9 8.6 - 10.0 mg/dL    Total Protein 6.5 (L) 6.6 - 8.7 g/dL    Albumin 3.7 3.5 - 5.2 g/dL    Total Bilirubin 1.0 0.2 - 1.2 mg/dL    Alkaline Phosphatase 114 40 - 130 U/L    ALT 8 5 - 41 U/L    AST 10 5 - 40 U/L   CBC Auto Differential    Collection Time: 05/30/21 12:40 PM   Result Value Ref Range    WBC 5.6 4.8 - 10.8 K/uL    RBC 5.76 4.70 - 6.10 M/uL    Hemoglobin 16.8 14.0 - 18.0 g/dL Hematocrit 50.8 42.0 - 52.0 %    MCV 88.2 80.0 - 94.0 fL    MCH 29.2 27.0 - 31.0 pg    MCHC 33.1 33.0 - 37.0 g/dL    RDW 18.2 (H) 11.5 - 14.5 %    Platelets 236 790 - 329 K/uL    MPV 9.3 (L) 9.4 - 12.4 fL    Neutrophils % 78.7 (H) 50.0 - 65.0 %    Lymphocytes % 7.0 (L) 20.0 - 40.0 %    Monocytes % 12.1 (H) 0.0 - 10.0 %    Eosinophils % 1.3 0.0 - 5.0 %    Basophils % 0.4 0.0 - 1.0 %    Neutrophils Absolute 4.4 1.5 - 7.5 K/uL    Immature Granulocytes # 0.0 K/uL    Lymphocytes Absolute 0.4 (L) 1.1 - 4.5 K/uL    Monocytes Absolute 0.70 0.00 - 0.90 K/uL    Eosinophils Absolute 0.10 0.00 - 0.60 K/uL    Basophils Absolute 0.00 0.00 - 0.20 K/uL   Troponin    Collection Time: 05/30/21 12:40 PM   Result Value Ref Range    Troponin <0.01 0.00 - 0.03 ng/mL   Brain Natriuretic Peptide    Collection Time: 05/30/21 12:40 PM   Result Value Ref Range    Pro- 0 - 900 pg/mL       Imaging: XR CHEST PORTABLE    Result Date: 5/30/2021  Exam: XR CHEST PORTABLE - 5/30/2021 11:55 AM Indication: Shortness of breath, history of colon cancer Comparison: None available. Findings: Cardiac silhouette is normal in size. RIGHT chest wall port catheter tip overlying the SVC. 6.5 cm LEFT lower lobe lung mass. 5.3 cm RIGHT suprahilar lung mass. No consolidative airspace process. No pleural effusion or pneumothorax. No acute osseous finding. 1.  No evidence of consolidation to suggest lobar pneumonia. 2.  RIGHT upper lobe and LEFT lower lobe lung masses, likely representing metastasis related to history of colon cancer. Correlate with clinical history and outside imaging (patient reportedly under treatment for colon cancer in [place] [id number]). Signed by Dr Salomon Street on 5/30/2021 1:27 PM    CTA PULMONARY W CONTRAST    Result Date: 5/30/2021  Exam: CTA PULMONARY W CONTRAST - 5/30/2021 1:43 PM Indication: Cough, hypoxia, history of cancer Comparison: None available. DLP: 1168 mGy cm.  In order to have a CT radiation dose as low as reasonably achievable, Automated Exposure Control was utilized for adjustment of the mA and/or KV according to patient size. Findings: Evaluation for PE slightly limited by contrast bolus timing. No evidence of pulmonary embolus. RIGHT chest port with catheter tip in the SVC. Thoracic aorta is nonaneurysmal. No pericardial effusion. 6.8 x 4.8 cm RIGHT hilar/upper lobe mass with extensive airway and vascular encasement. There is direct extension into the mediastinum with partial encasement and apparent direct invasion of the RIGHT mainstem bronchus and bronchus intermedius, intraluminal soft tissue and focal severe narrowing. Tree-in-bud nodularity throughout the RIGHT lower and RIGHT middle lobe is present, differential for which includes endobronchial splaying of neoplasm and postobstructive pneumonia. The RIGHT lung does remain aerated. 5.0 x 5.5 cm subpleural LEFT lower lobe mass. 11 mm LEFT lower lobe pulmonary nodule. No pleural effusion or pneumothorax. Enlarged mediastinal and bilateral hilar lymph nodes. No acute chest wall soft tissue abnormality. 11 mm hypodense caudate liver lesion. Bilateral adrenal gland masses. On the RIGHT, there is concern for tumor thrombus within the IVC. No aggressive osseous lesion identified. 1.  No evidence of pulmonary embolus. 2.  Extensive metastatic disease in the chest and upper abdomen. 6.8 cm RIGHT hilar/upper lobe mass with extensive vascular and airway encasement and direct mediastinal invasion with encasement and direct invasion of the RIGHT mainstem bronchus and bronchus intermedius. RIGHT lower lobe and middle lobe tree-in-bud nodularity is present with differential including endobronchial spread of neoplasm and postobstructive pneumonia. There is also a 5.5 cm mass in the subpleural LEFT lower lobe as well as an 11 mm LEFT lower lobe pulmonary nodule. Mediastinal and bilateral hilar lymphadenopathy is present.  In the upper abdomen, at least one liver metastasis and bilateral adrenal gland masses are present. Suspected tumor thrombus in the IVC adjacent to the RIGHT adrenal gland. Signed by Dr Rosa Wong on 5/30/2021 3:22 PM      Assessment/Plan:     Principal Problem:    CAP (community acquired pneumonia)  Active Problems:    Colon cancer metastasized to lung ([facility] [address]    Diabetes ([facility] [address]  Resolved Problems:    * No resolved hospital problems. *       Dyspnea in setting of extensive metastatic disease involving lungs, 6.8 cm right hilar/upper lobe mass with mediastinal invasion and invasion of the right mainstem bronchus  Postobstructive pneumonia of RML and RLL  --Will consult pulmonary for consideration of any potential therapeutic endobronchial procedure  --Treat with Rocephin and azithromycin  --Bronchodilators, incentive spirometry  --Supportive care    Metastatic colorectal cancer involving lungs, liver, adrenal glands  Oncology and palliative care consult    ?   Possible tumor thrombus in IVC adjacent to right adrenal gland  Further anticoagulation recommendations per hematology/oncology    Diabetes mellitus  Point-of-care glucose monitoring  Resume home basal and prandial insulin regimen, sliding scale insulin, adjust regimen as indicated    Reconcile home meds    Obtain records from patient's oncology group if able      Maura Chopra MD  5/30/2021 3:39 PM

## 2021-05-31 LAB
ANION GAP SERPL CALCULATED.3IONS-SCNC: 9 MMOL/L (ref 7–19)
BASOPHILS ABSOLUTE: 0 K/UL (ref 0–0.2)
BASOPHILS RELATIVE PERCENT: 0.2 % (ref 0–1)
BUN BLDV-MCNC: 8 MG/DL (ref 6–20)
C-REACTIVE PROTEIN: 15.53 MG/DL (ref 0–0.5)
CALCIUM SERPL-MCNC: 8.6 MG/DL (ref 8.6–10)
CHLORIDE BLD-SCNC: 103 MMOL/L (ref 98–111)
CO2: 30 MMOL/L (ref 22–29)
CREAT SERPL-MCNC: 1 MG/DL (ref 0.5–1.2)
EOSINOPHILS ABSOLUTE: 0.1 K/UL (ref 0–0.6)
EOSINOPHILS RELATIVE PERCENT: 1.1 % (ref 0–5)
GFR AFRICAN AMERICAN: >59
GFR NON-AFRICAN AMERICAN: >60
GLUCOSE BLD-MCNC: 122 MG/DL (ref 74–109)
GLUCOSE BLD-MCNC: 128 MG/DL (ref 70–99)
GLUCOSE BLD-MCNC: 138 MG/DL (ref 70–99)
GLUCOSE BLD-MCNC: 169 MG/DL (ref 70–99)
GLUCOSE BLD-MCNC: 169 MG/DL (ref 70–99)
HCT VFR BLD CALC: 46.7 % (ref 42–52)
HEMOGLOBIN: 15 G/DL (ref 14–18)
IMMATURE GRANULOCYTES #: 0 K/UL
INR BLD: 1.17 (ref 0.88–1.18)
LYMPHOCYTES ABSOLUTE: 0.5 K/UL (ref 1.1–4.5)
LYMPHOCYTES RELATIVE PERCENT: 8.5 % (ref 20–40)
MCH RBC QN AUTO: 29 PG (ref 27–31)
MCHC RBC AUTO-ENTMCNC: 32.1 G/DL (ref 33–37)
MCV RBC AUTO: 90.2 FL (ref 80–94)
MONOCYTES ABSOLUTE: 0.8 K/UL (ref 0–0.9)
MONOCYTES RELATIVE PERCENT: 14.3 % (ref 0–10)
NEUTROPHILS ABSOLUTE: 4.3 K/UL (ref 1.5–7.5)
NEUTROPHILS RELATIVE PERCENT: 75.5 % (ref 50–65)
PDW BLD-RTO: 18.1 % (ref 11.5–14.5)
PERFORMED ON: ABNORMAL
PLATELET # BLD: 152 K/UL (ref 130–400)
PMV BLD AUTO: 8.7 FL (ref 9.4–12.4)
POTASSIUM REFLEX MAGNESIUM: 3.7 MMOL/L (ref 3.5–5)
PROCALCITONIN: 0.34 NG/ML (ref 0–0.09)
PROTHROMBIN TIME: 14.9 SEC (ref 12–14.6)
RBC # BLD: 5.18 M/UL (ref 4.7–6.1)
SODIUM BLD-SCNC: 142 MMOL/L (ref 136–145)
WBC # BLD: 5.7 K/UL (ref 4.8–10.8)

## 2021-05-31 PROCEDURE — 99223 1ST HOSP IP/OBS HIGH 75: CPT | Performed by: INTERNAL MEDICINE

## 2021-05-31 PROCEDURE — 82947 ASSAY GLUCOSE BLOOD QUANT: CPT

## 2021-05-31 PROCEDURE — 36415 COLL VENOUS BLD VENIPUNCTURE: CPT

## 2021-05-31 PROCEDURE — 6370000000 HC RX 637 (ALT 250 FOR IP): Performed by: STUDENT IN AN ORGANIZED HEALTH CARE EDUCATION/TRAINING PROGRAM

## 2021-05-31 PROCEDURE — 85025 COMPLETE CBC W/AUTO DIFF WBC: CPT

## 2021-05-31 PROCEDURE — 2700000000 HC OXYGEN THERAPY PER DAY

## 2021-05-31 PROCEDURE — 84145 PROCALCITONIN (PCT): CPT

## 2021-05-31 PROCEDURE — 2580000003 HC RX 258: Performed by: STUDENT IN AN ORGANIZED HEALTH CARE EDUCATION/TRAINING PROGRAM

## 2021-05-31 PROCEDURE — 1210000000 HC MED SURG R&B

## 2021-05-31 PROCEDURE — 86140 C-REACTIVE PROTEIN: CPT

## 2021-05-31 PROCEDURE — 80048 BASIC METABOLIC PNL TOTAL CA: CPT

## 2021-05-31 PROCEDURE — 94640 AIRWAY INHALATION TREATMENT: CPT

## 2021-05-31 PROCEDURE — 97162 PT EVAL MOD COMPLEX 30 MIN: CPT

## 2021-05-31 PROCEDURE — 97110 THERAPEUTIC EXERCISES: CPT

## 2021-05-31 PROCEDURE — 6360000002 HC RX W HCPCS: Performed by: STUDENT IN AN ORGANIZED HEALTH CARE EDUCATION/TRAINING PROGRAM

## 2021-05-31 PROCEDURE — 85610 PROTHROMBIN TIME: CPT

## 2021-05-31 RX ADMIN — ACETAMINOPHEN 650 MG: 325 TABLET ORAL at 04:42

## 2021-05-31 RX ADMIN — ACETAMINOPHEN 650 MG: 325 TABLET ORAL at 19:33

## 2021-05-31 RX ADMIN — SODIUM CHLORIDE, PRESERVATIVE FREE 1000 MG: 5 INJECTION INTRAVENOUS at 09:56

## 2021-05-31 RX ADMIN — AZITHROMYCIN 500 MG: 250 TABLET, FILM COATED ORAL at 09:55

## 2021-05-31 RX ADMIN — ACETAMINOPHEN 650 MG: 325 TABLET ORAL at 13:10

## 2021-05-31 RX ADMIN — INSULIN LISPRO 2 UNITS: 100 INJECTION, SOLUTION INTRAVENOUS; SUBCUTANEOUS at 13:00

## 2021-05-31 RX ADMIN — INSULIN LISPRO 2 UNITS: 100 INJECTION, SOLUTION INTRAVENOUS; SUBCUTANEOUS at 09:56

## 2021-05-31 RX ADMIN — SODIUM CHLORIDE, PRESERVATIVE FREE 10 ML: 5 INJECTION INTRAVENOUS at 21:30

## 2021-05-31 RX ADMIN — ENOXAPARIN SODIUM 40 MG: 40 INJECTION SUBCUTANEOUS at 09:55

## 2021-05-31 RX ADMIN — IPRATROPIUM BROMIDE AND ALBUTEROL SULFATE 1 AMPULE: .5; 3 SOLUTION RESPIRATORY (INHALATION) at 18:51

## 2021-05-31 RX ADMIN — IPRATROPIUM BROMIDE AND ALBUTEROL SULFATE 1 AMPULE: .5; 3 SOLUTION RESPIRATORY (INHALATION) at 10:32

## 2021-05-31 RX ADMIN — IPRATROPIUM BROMIDE AND ALBUTEROL SULFATE 1 AMPULE: .5; 3 SOLUTION RESPIRATORY (INHALATION) at 06:52

## 2021-05-31 RX ADMIN — IPRATROPIUM BROMIDE AND ALBUTEROL SULFATE 1 AMPULE: .5; 3 SOLUTION RESPIRATORY (INHALATION) at 15:27

## 2021-05-31 ASSESSMENT — PAIN DESCRIPTION - ORIENTATION
ORIENTATION: UPPER
ORIENTATION: UPPER

## 2021-05-31 ASSESSMENT — PAIN DESCRIPTION - FREQUENCY: FREQUENCY: INTERMITTENT

## 2021-05-31 ASSESSMENT — PAIN SCALES - GENERAL
PAINLEVEL_OUTOF10: 6
PAINLEVEL_OUTOF10: 6
PAINLEVEL_OUTOF10: 4
PAINLEVEL_OUTOF10: 0
PAINLEVEL_OUTOF10: 3
PAINLEVEL_OUTOF10: 7

## 2021-05-31 ASSESSMENT — PAIN DESCRIPTION - PROGRESSION: CLINICAL_PROGRESSION: GRADUALLY WORSENING

## 2021-05-31 ASSESSMENT — PAIN DESCRIPTION - PAIN TYPE
TYPE: ACUTE PAIN
TYPE: ACUTE PAIN

## 2021-05-31 ASSESSMENT — PAIN DESCRIPTION - DESCRIPTORS
DESCRIPTORS: SORE
DESCRIPTORS: SORE

## 2021-05-31 ASSESSMENT — PAIN DESCRIPTION - LOCATION: LOCATION: RIB CAGE

## 2021-05-31 ASSESSMENT — PAIN - FUNCTIONAL ASSESSMENT
PAIN_FUNCTIONAL_ASSESSMENT: PREVENTS OR INTERFERES SOME ACTIVE ACTIVITIES AND ADLS
PAIN_FUNCTIONAL_ASSESSMENT: PREVENTS OR INTERFERES SOME ACTIVE ACTIVITIES AND ADLS

## 2021-05-31 ASSESSMENT — PAIN DESCRIPTION - ONSET: ONSET: ON-GOING

## 2021-05-31 NOTE — PROGRESS NOTES
Daily Progress Note    Date:2021  Patient: Claire Alcantara  : 1960  BONNIE:[id number]  CODE:Full Code No additional code details  Hernan Noriega MD, MD    Admit Date: 2021 11:40 AM   LOS: 1 day       Subjective:   No acute events overnight. Patient remains dyspneic with cough on 2 to 3 L supplemental O2 via NC. No fevers/chills overnight. Hospital course: 51-year-old male with diabetes, metastatic colon cancer with metastatic disease to lungs, liver, adrenal glands and retroperitoneal node followed by oncology in [facility] [address] on chemotherapy regimen every 2 weeks, history of surgical resection radiation therapy, recently moved to [place], presented on  with progressive dyspnea and cough over 2 months with subsequent severe worsening symptoms acutely with intermittently productive cough with mucus and scant hemoptysis. Further work-up on CT chest negative for PE, but showed extensive metastatic lung disease with upper lobe mass with invasion into the mediastinum and invasion into the right mainstem bronchus with postobstructive pneumonia involving RML and RLL. Treated with Rocephin and azithromycin. Evaluated by oncology service. Plan for pulmonary evaluation to discuss candidacy for any endobronchial intervention.         Review of Systems    Comprehensive ROS completed and is negative except as otherwise noted      Objective:      Vital signs in last 24 hours:  Patient Vitals for the past 24 hrs:   BP Temp Temp src Pulse Resp SpO2 Height Weight   21 1013 126/84 97.4 °F (36.3 °C) Temporal 102 16 90 % -- --   21 0651 123/83 97.4 °F (36.3 °C) Temporal 98 18 91 % -- --   21 0500 -- -- -- -- -- -- -- 204 lb 9.6 oz (92.8 kg)   21 0214 125/79 98.5 °F (36.9 °C) Temporal 89 20 90 % -- --   21 2244 115/74 97.6 °F (36.4 °C) Temporal 102 24 92 % -- --   21 2130 -- -- -- 101 -- -- -- --   21 1930 -- -- -- -- -- 91 % -- --   21 1700 (!) 147/92 97.8 °F (36.6 °C) -- 106 26 -- -- --   05/30/21 1640 (!) 112/92 98.2 °F (36.8 °C) Oral 110 (!) 38 92 % -- --   05/30/21 1543 -- -- -- 107 30 -- -- --   05/30/21 1427 (!) 132/94 -- -- 99 30 (!) 89 % -- --   05/30/21 1305 113/87 -- -- 103 23 91 % -- --   05/30/21 1221 109/80 -- -- 107 17 91 % -- --   05/30/21 1144 (!) 139/97 97.9 °F (36.6 °C) Oral 99 22 90 % 6' 1\" (1.854 m) 204 lb (92.5 kg)       Physical exam     General: Chronically ill-appearing, no apparent distress  PSYCH: Normal mood  EYES: Symmetrical, no scleral icterus  Head: Normocephalic, atraumatic  LUNGS: Diminished breath sounds of RLL  CARDIOVASCULAR:  Normal rhythm, peripheral pulses equal  GI/ABDOMEN: Soft, non-tender, non-distended, no guarding or rebound. Bowel sounds are normal.  SKIN: Warm and dry.    NEUROLOGICAL: No focal neurologic deficits  EXTREMITIES: No cyanosis or edema    Lab Review   Recent Results (from the past 24 hour(s))   Blood Gas, Arterial    Collection Time: 05/30/21 12:34 PM   Result Value Ref Range    pH, Arterial 7.430 7.350 - 7.450    pCO2, Arterial 35.0 35.0 - 45.0 mmHg    pO2, Arterial 63.0 (L) 80.0 - 100.0 mmHg    HCO3, Arterial 23.2 22.0 - 26.0 mmol/L    Base Excess, Arterial -0.5 -2.0 - 2.0 mmol/L    Hemoglobin, Art, Extended 17.5 14.0 - 18.0 g/dL    O2 Sat, Arterial 91.4 >92 %    Carboxyhgb, Arterial 1.9 0.0 - 5.0 %    Methemoglobin, Arterial 0.9 <1.5 %    O2 Content, Arterial 22.4 Not Established mL/dL    O2 Therapy Unknown    Potassium, Whole Blood    Collection Time: 05/30/21 12:34 PM   Result Value Ref Range    Potassium, Whole Blood 3.6    Lactic Acid, Plasma    Collection Time: 05/30/21 12:40 PM   Result Value Ref Range    Lactic Acid 1.4 0.5 - 1.9 mmol/L   Respiratory Panel, Molecular, with COVID-19 (Restricted: peds pts or suitable admitted adults)    Collection Time: 05/30/21 12:40 PM    Specimen: Nasopharyngeal   Result Value Ref Range    Adenovirus by PCR Not Detected Not Detected    Bordetella parapertussis by PCR Not Detected Not Detected    Bordetella pertussis by PCR Not Detected Not Detected    Chlamydophilia pneumoniae by PCR Not Detected Not Detected    Coronavirus 229E by PCR Not Detected Not Detected    Coronavirus HKU1 by PCR Not Detected Not Detected    Coronavirus NL63 by PCR Not Detected Not Detected    Coronavirus OC43 by PCR Not Detected Not Detected    SARS-CoV-2, PCR Not Detected Not Detected    Human Metapneumovirus by PCR Not Detected Not Detected    Human Rhinovirus/Enterovirus by PCR Not Detected Not Detected    Influenza A by PCR Not Detected Not Detected    Influenza B by PCR Not Detected Not Detected    Mycoplasma pneumoniae by PCR Not Detected Not Detected    Parainfluenza Virus 1 by PCR Not Detected Not Detected    Parainfluenza Virus 2 by PCR Not Detected Not Detected    Parainfluenza Virus 3 by PCR Not Detected Not Detected    Parainfluenza Virus 4 by PCR Not Detected Not Detected    Respiratory Syncytial Virus by PCR Not Detected Not Detected   Comprehensive Metabolic Panel    Collection Time: 05/30/21 12:40 PM   Result Value Ref Range    Sodium 140 136 - 145 mmol/L    Potassium 3.7 3.5 - 5.0 mmol/L    Chloride 105 98 - 111 mmol/L    CO2 24 22 - 29 mmol/L    Anion Gap 11 7 - 19 mmol/L    Glucose 114 (H) 74 - 109 mg/dL    BUN 8 6 - 20 mg/dL    CREATININE 0.9 0.5 - 1.2 mg/dL    GFR Non-African American >60 >60    GFR African American >59 >59    Calcium 8.9 8.6 - 10.0 mg/dL    Total Protein 6.5 (L) 6.6 - 8.7 g/dL    Albumin 3.7 3.5 - 5.2 g/dL    Total Bilirubin 1.0 0.2 - 1.2 mg/dL    Alkaline Phosphatase 114 40 - 130 U/L    ALT 8 5 - 41 U/L    AST 10 5 - 40 U/L   CBC Auto Differential    Collection Time: 05/30/21 12:40 PM   Result Value Ref Range    WBC 5.6 4.8 - 10.8 K/uL    RBC 5.76 4.70 - 6.10 M/uL    Hemoglobin 16.8 14.0 - 18.0 g/dL    Hematocrit 50.8 42.0 - 52.0 %    MCV 88.2 80.0 - 94.0 fL    MCH 29.2 27.0 - 31.0 pg    MCHC 33.1 33.0 - 37.0 g/dL    RDW 18.2 (H) 11.5 - 14.5 % Platelets 848 960 - 812 K/uL    MPV 9.3 (L) 9.4 - 12.4 fL    Neutrophils % 78.7 (H) 50.0 - 65.0 %    Lymphocytes % 7.0 (L) 20.0 - 40.0 %    Monocytes % 12.1 (H) 0.0 - 10.0 %    Eosinophils % 1.3 0.0 - 5.0 %    Basophils % 0.4 0.0 - 1.0 %    Neutrophils Absolute 4.4 1.5 - 7.5 K/uL    Immature Granulocytes # 0.0 K/uL    Lymphocytes Absolute 0.4 (L) 1.1 - 4.5 K/uL    Monocytes Absolute 0.70 0.00 - 0.90 K/uL    Eosinophils Absolute 0.10 0.00 - 0.60 K/uL    Basophils Absolute 0.00 0.00 - 0.20 K/uL   Troponin    Collection Time: 05/30/21 12:40 PM   Result Value Ref Range    Troponin <0.01 0.00 - 0.03 ng/mL   Brain Natriuretic Peptide    Collection Time: 05/30/21 12:40 PM   Result Value Ref Range    Pro- 0 - 900 pg/mL   POCT Glucose    Collection Time: 05/30/21  7:59 PM   Result Value Ref Range    POC Glucose 142 (H) 70 - 99 mg/dl    Performed on [employer]    Basic Metabolic Panel w/ Reflex to MG    Collection Time: 05/31/21  3:34 AM   Result Value Ref Range    Sodium 142 136 - 145 mmol/L    Potassium reflex Magnesium 3.7 3.5 - 5.0 mmol/L    Chloride 103 98 - 111 mmol/L    CO2 30 (H) 22 - 29 mmol/L    Anion Gap 9 7 - 19 mmol/L    Glucose 122 (H) 74 - 109 mg/dL    BUN 8 6 - 20 mg/dL    CREATININE 1.0 0.5 - 1.2 mg/dL    GFR Non-African American >60 >60    GFR African American >59 >59    Calcium 8.6 8.6 - 10.0 mg/dL   CBC auto differential    Collection Time: 05/31/21  3:34 AM   Result Value Ref Range    WBC 5.7 4.8 - 10.8 K/uL    RBC 5.18 4.70 - 6.10 M/uL    Hemoglobin 15.0 14.0 - 18.0 g/dL    Hematocrit 46.7 42.0 - 52.0 %    MCV 90.2 80.0 - 94.0 fL    MCH 29.0 27.0 - 31.0 pg    MCHC 32.1 (L) 33.0 - 37.0 g/dL    RDW 18.1 (H) 11.5 - 14.5 %    Platelets 915 610 - 658 K/uL    MPV 8.7 (L) 9.4 - 12.4 fL    Neutrophils % 75.5 (H) 50.0 - 65.0 %    Lymphocytes % 8.5 (L) 20.0 - 40.0 %    Monocytes % 14.3 (H) 0.0 - 10.0 %    Eosinophils % 1.1 0.0 - 5.0 %    Basophils % 0.2 0.0 - 1.0 %    Neutrophils Absolute 4.3 1.5 - 7.5 K/uL    Immature Granulocytes # 0.0 K/uL    Lymphocytes Absolute 0.5 (L) 1.1 - 4.5 K/uL    Monocytes Absolute 0.80 0.00 - 0.90 K/uL    Eosinophils Absolute 0.10 0.00 - 0.60 K/uL    Basophils Absolute 0.00 0.00 - 0.20 K/uL   Protime-INR    Collection Time: 05/31/21  3:34 AM   Result Value Ref Range    Protime 14.9 (H) 12.0 - 14.6 sec    INR 1.17 0.88 - 1.18   PROCALCITONIN    Collection Time: 05/31/21  3:34 AM   Result Value Ref Range    Procalcitonin 0.34 (H) 0.00 - 0.09 ng/mL   C-REACTIVE PROTEIN    Collection Time: 05/31/21  3:34 AM   Result Value Ref Range    CRP 15.53 (H) 0.00 - 0.50 mg/dL   POCT Glucose    Collection Time: 05/31/21  6:44 AM   Result Value Ref Range    POC Glucose 169 (H) 70 - 99 mg/dl    Performed on [employer]        I/O last 3 completed shifts: In: 1583 [P.O.:730; I.V.:800]  Out: -   I/O this shift:   In: 26 [P.O.:470]  Out: -       Current Facility-Administered Medications:     insulin glargine (LANTUS) injection vial 32 Units, 32 Units, Subcutaneous, Nightly, Karlee Fraga MD, 12 Units at 05/30/21 2115    insulin lispro (HUMALOG) injection vial 8 Units, 8 Units, Subcutaneous, TID WC, Karlee Fraga MD, 8 Units at 05/31/21 1008    prochlorperazine (COMPAZINE) tablet 10 mg, 10 mg, Oral, Q6H PRN, Karlee Fraga MD    cefTRIAXone (ROCEPHIN) 1,000 mg in sodium chloride (PF) 10 mL IV syringe, 1,000 mg, Intravenous, Q24H, Karlee Fraga MD, 1,000 mg at 05/31/21 3490    azithromycin (ZITHROMAX) tablet 500 mg, 500 mg, Oral, Daily, Karlee Fraga MD, 500 mg at 05/31/21 1948    sodium chloride flush 0.9 % injection 5-40 mL, 5-40 mL, Intravenous, 2 times per day, Karlee Fraga MD, 10 mL at 05/30/21 2119    sodium chloride flush 0.9 % injection 5-40 mL, 5-40 mL, Intravenous, PRN, Karlee Fraga MD    0.9 % sodium chloride infusion, 25 mL, Intravenous, PRN, Karlee Fraga MD    enoxaparin (LOVENOX) injection 40 mg, 40 mg, Subcutaneous, Daily, Karlee Fraga MD, 40 mg at 05/31/21 0955    promethazine (PHENERGAN) tablet 12.5 mg, 12.5 mg, Oral, Q6H PRN **OR** ondansetron (ZOFRAN) injection 4 mg, 4 mg, Intravenous, Q6H PRN, Leo Clark MD, 4 mg at 05/30/21 1625    polyethylene glycol (GLYCOLAX) packet 17 g, 17 g, Oral, Daily PRN, Leo Clark MD    acetaminophen (TYLENOL) tablet 650 mg, 650 mg, Oral, Q6H PRN, 650 mg at 05/31/21 0442 **OR** acetaminophen (TYLENOL) suppository 650 mg, 650 mg, Rectal, Q6H PRN, Leo Clark MD    0.9 % sodium chloride infusion, , Intravenous, Continuous, Leo Clark MD, Last Rate: 100 mL/hr at 05/30/21 1640, New Bag at 05/30/21 1640    potassium chloride (KLOR-CON M) extended release tablet 40 mEq, 40 mEq, Oral, PRN **OR** potassium bicarb-citric acid (EFFER-K) effervescent tablet 40 mEq, 40 mEq, Oral, PRN **OR** potassium chloride 10 mEq/100 mL IVPB (Peripheral Line), 10 mEq, Intravenous, PRN, Leo Clark MD    magnesium sulfate 2000 mg in 50 mL IVPB premix, 2,000 mg, Intravenous, PRN, Leo Clark MD    insulin lispro (HUMALOG) injection vial 0-12 Units, 0-12 Units, Subcutaneous, TID WC, Leo Clark MD, 2 Units at 05/31/21 8728    insulin lispro (HUMALOG) injection vial 0-6 Units, 0-6 Units, Subcutaneous, Nightly, Leo Clark MD, 1 Units at 05/30/21 4437    ipratropium-albuterol (DUONEB) nebulizer solution 1 ampule, 1 ampule, Inhalation, Q4H WA, Leo Clark MD, 1 ampule at 05/31/21 9330        Assessment/plan  Principal Problem:    CAP (community acquired pneumonia)  Active Problems:    [facility]    Diabetes ([facility]  Resolved Problems:    * No resolved hospital problems.  *    Dyspnea, secondary to postobstructive pneumonia of RML and RLL with extensive lung metastasis, with large mass invading right mainstem bronchus  --Continue current antibiotics  --Bronchodilators, pulmonary toileting, incentive spirometry  --Pulmonology evaluation when available, discuss candidacy for any

## 2021-05-31 NOTE — CONSULTS
MEDICAL ONCOLOGY CONSULTATION    Pt Name: Jose Palumbo  MRN: [id number]  YOB: 1960  Date of evaluation: 5/31/2021    REASON FOR CONSULTATION: Colon cancer and pulmonary metastasis  REQUESTING PHYSICIAN: Hospitalist    History Obtained From:    patient, electronic medical record    HISTORY OF PRESENT ILLNESS:    Diagnosis  · Metastatic colonic adenocarcinoma, August 2017  · pT3N2a, stage IIIB  · Pulmonary embolism, May 2019  · K-michael mutated  · MSI stable  · PIK3CA mutated  · Liver metastasis, January 2018    Treatment summary  · Modified FOLFOX 6  · 5-FU/leucovorin  · SBRT right upper lobe lung mass  · FOLFIRI      Target lesions  · Liver metastasis  · Right upper lobe/left lower lobe lung metastasis  · Adrenal metastasis bilaterally    Umair Fernando was first seen by me on 5/31/2021. Patient is a pleasant 64years old male who is a patient of Dr. Augie Freeman oncologist at [facility] in [facility]. Patient was initially diagnosed in July 2017 when he was hospitalized for colitis. CT abdomen showed thickening of the lower right colon. A colonoscopy was performed in August 2017 that showed a large fungating mass in the cecum and proximal ascending colon. Biopsy was consistent with invasive carcinoma with mucinous features. Staging CT scans showed a left lower lobe micronodule measuring 3 mm and a 1.6 cm left adrenal nodule in September 2017. The patient underwent a laparoscopic open right colectomy, partial omentectomy and partial peritonectomy on 10/17/2017. Pathology consistent with differentiated mucinous adenocarcinoma involving the cecal pouch, ileocecal valve and extending intraluminally into the terminal ileus. For tumor deposits present. Final pathologic staging consistent with K-michael mutated, for positive lymph nodes [id number], stage IIIB colon cancer. He received adjuvant modified FOLFOX 6 started November 2017.   Unfortunately biopsy-proven metastatic disease to the liver documented in January 2018. Avastin was added to his regimen. He completed 10 cycles of oxaliplatin which was discontinued. Avastin was added on for cycle #5. He then completed 32 cycles of 5-FU/Avastin. He developed pulmonary embolism and was started on Lovenox and later on switched to Eliquis in August 2019. In July 2020 he had disease progression and was started on FOLFIRI. He received hyperfractionated RT to left lower lobe nodule July 2019. He was last seen by his oncologist on 05/12/2021. Patient was considering moving care to [place] where he lives with his daughter. Apparently his last chemotherapy was in November 2020 and the patient was lost to follow-up. He was seen again in February 2021 and received a cycle of FOLFIRI. Poor compliance to his medical visit with his oncologist due to several logistic problems. 3/5/2021-CT chest performed at the [facility] showed the previously seen right upper lobe mass has significantly enlarged currently measuring 5.0 x 5.5 x 6.9 cm and extending into and at some levels is indistinct from the right bronchovascular structures with also some narrowing of the pulmonary arteries of this region. Ephriam Tay are multiple small satellite nodular opacities surrounding this mass. The previously seen left lower lobe mass has significantly enlarged currently measuring 5.0 x 4.7 x 4.7 cm. Ephriam Tay is a newly developed small left lower lobe nodule measuring 7.5 mm.   3/5/2021-CT abdomen pelvis showed bilateral adrenal nodules with a left adrenal nodule measuring 3.9 x 1.6 cm. The right adrenal nodule measured 1.8 cm. Subcentimeter low-density liver lesions and additional subcentimeter hyperenhancing focus in the liver, change. Possible wall thickening of the distal colon and rectum. Status post right hemicolectomy.   5/12/2021-he was last seen by his oncologist.  Patient plans to move care to [place].  5/14/2021-last infusion of palliative FOLFIRI  5/30/2021-the patient presented to the ED department at [facility] with complaints of increased short of breath over the last past month. He also had productive cough with greenish sputum. Denied any fever or chills.           Past Medical History:    Past Medical History:   Diagnosis Date    Cancer ([facility] [address]     colon    Diabetes mellitus ([facility] [address]        Past Surgical History:    Past Surgical History:   Procedure Laterality Date    ABDOMEN SURGERY      COLONOSCOPY      ENDOSCOPY, COLON, DIAGNOSTIC      TUNNELED VENOUS PORT PLACEMENT         Social History:    Smoking status: Former smoker, quit October 2020  ETOH status: No  Resides: [facility]    Family History:   · No family history of colon cancer  Current Hospital Medications:    Current Facility-Administered Medications   Medication Dose Route Frequency Provider Last Rate Last Admin    insulin glargine (LANTUS) injection vial 32 Units  32 Units Subcutaneous Nightly Leo Clark MD   12 Units at 05/30/21 2115    insulin lispro (HUMALOG) injection vial 8 Units  8 Units Subcutaneous TID  Leo Clark MD        prochlorperazine (COMPAZINE) tablet 10 mg  10 mg Oral Q6H PRN Leo Clark MD        cefTRIAXone (ROCEPHIN) 1,000 mg in sodium chloride (PF) 10 mL IV syringe  1,000 mg Intravenous Q24H Leo Clark MD        [facility]) tablet 500 mg  500 mg Oral Daily Leo Clark MD        sodium chloride flush 0.9 % injection 5-40 mL  5-40 mL Intravenous 2 times per day Leo Clark MD   10 mL at 05/30/21 2119    sodium chloride flush 0.9 % injection 5-40 mL  5-40 mL Intravenous PRN Leo Clark MD        0.9 % sodium chloride infusion  25 mL Intravenous PRN Leo Clark MD        enoxaparin (LOVENOX) injection 40 mg  40 mg Subcutaneous Daily Leo Clark MD        promethazine (PHENERGAN) tablet 12.5 mg  12.5 mg Oral Q6H PRN [place] MD [place]        Or    ondansetron (ZOFRAN) injection 4 mg  4 mg Intravenous Q6H PRN Franklin Camargo MD   4 mg at 05/30/21 1625    polyethylene glycol (GLYCOLAX) packet 17 g  17 g Oral Daily PRN Franklin Camargo MD        acetaminophen (TYLENOL) tablet 650 mg  650 mg Oral Q6H PRN Franklin Camargo MD   650 mg at 05/31/21 5275    Or    acetaminophen (TYLENOL) suppository 650 mg  650 mg Rectal Q6H PRN Franklin Camargo MD        0.9 % sodium chloride infusion   Intravenous Continuous Franklin Camargo  mL/hr at 05/30/21 1640 New Bag at 05/30/21 1640    potassium chloride (KLOR-CON M) extended release tablet 40 mEq  40 mEq Oral PRN Franklin Camargo MD        Or    potassium bicarb-citric acid (EFFER-K) effervescent tablet 40 mEq  40 mEq Oral PRN Franklin Camargo MD        Or    potassium chloride 10 mEq/100 mL IVPB (Peripheral Line)  10 mEq Intravenous PRN Franklin Camargo MD        magnesium sulfate 2000 mg in 50 mL IVPB premix  2,000 mg Intravenous PRN Franklin Camargo MD        insulin lispro (HUMALOG) injection vial 0-12 Units  0-12 Units Subcutaneous TID WC Franklin Camargo MD        insulin lispro (HUMALOG) injection vial 0-6 Units  0-6 Units Subcutaneous Nightly Franklin Camargo MD   1 Units at 05/30/21 2117    ipratropium-albuterol (DUONEB) nebulizer solution 1 ampule  1 ampule Inhalation Q4H WA Franklin Camargo MD   1 ampule at 05/31/21 0129       Allergies:    Allergies   Allergen Reactions    Varenicline      Other reaction(s): Anger and Agression   -          Subjective   REVIEW OF SYSTEMS:   CONSTITUTIONAL: no fever, no night sweats, fatigue;  HEENT: no blurring of vision, no double vision, no hearing difficulty, no tinnitus, no ulceration, no epistaxis;  LUNGS: Productive cough,  hemoptysis, no wheeze,  shortness of breath;  CARDIOVASCULAR: no palpitation, no chest pain, shortness of breath;  GI: no abdominal pain, no nausea, no vomiting, no diarrhea, no constipation;  ALVIN: no dysuria, no hematuria, no frequency or urgency, no nephrolithiasis;  MUSCULOSKELETAL: no joint pain, no swelling, no stiffness;  ENDOCRINE: no polyuria, no polydipsia, no cold or heat intolerance;  HEMATOLOGY: no easy bruising or bleeding, no history of clotting disorder;  DERMATOLOGY: no skin rash, no eczema, no pruritus;  PSYCHIATRY: no depression, no anxiety, no panic attacks, no suicidal ideation, no homicidal ideation;  NEUROLOGY: no syncope, no seizures, no numbness or tingling of hands, no numbness or tingling of feet, no paresis;    Objective   /83   Pulse 98   Temp 97.4 °F (36.3 °C) (Temporal)   Resp 18   Ht 6' 1\" (1.854 m)   Wt 204 lb 9.6 oz (92.8 kg)   SpO2 91%   BMI 26.99 kg/m²     PHYSICAL EXAM:  CONSTITUTIONAL: Alert, appropriate, no acute distress  EYES: Non icteric, EOM intact, pupils equal round   ENT: Mucus membranes moist, no oral pharyngeal lesions, external inspection of ears and nose are normal  NECK: Supple, no masses. No palpable thyroid mass  CHEST/LUNGS: CTA bilaterally, normal respiratory effort   CARDIOVASCULAR: RRR, no murmurs. No lower extremity edema  ABDOMEN: soft non-tender, active bowel sounds, no HSM. No palpable masses  EXTREMITIES: warm, full ROM in all 4 extremities, no focal weakness. SKIN: warm, dry with no rashes or lesions  LYMPH: No cervical, clavicular, axillary, or inguinal lymphadenopathy  NEUROLOGIC: follows commands, non focal   PSYCH: mood and affect appropriate.  Alert and oriented to time, place, person        LABORATORY RESULTS REVIEWED/ANALYZED BY ME:  Recent Labs     05/31/21  0334 05/30/21  1240   WBC 5.7 5.6   HGB 15.0 16.8   HCT 46.7 50.8   MCV 90.2 88.2    183       Lab Results   Component Value Date     05/31/2021    K 3.7 05/31/2021     05/31/2021    CO2 30 (H) 05/31/2021    BUN 8 05/31/2021    CREATININE 1.0 05/31/2021    GLUCOSE 122 (H) 05/31/2021    CALCIUM 8.6 05/31/2021    PROT 6.5 (L) 05/30/2021    LABALBU 3.7 05/30/2021 BILITOT 1.0 05/30/2021    ALKPHOS 114 05/30/2021    AST 10 05/30/2021    ALT 8 05/30/2021    LABGLOM >60 05/31/2021    GFRAA >59 05/31/2021       Lab Results   Component Value Date    INR 1.17 05/31/2021    PROTIME 14.9 (H) 05/31/2021       RADIOLOGY STUDIES REPORT/REVIEWED AND INTERPRETED BY ME:  XR CHEST PORTABLE    Result Date: 5/30/2021  Exam: XR CHEST PORTABLE - 5/30/2021 11:55 AM Indication: Shortness of breath, history of colon cancer Comparison: None available. Findings: Cardiac silhouette is normal in size. RIGHT chest wall port catheter tip overlying the SVC. 6.5 cm LEFT lower lobe lung mass. 5.3 cm RIGHT suprahilar lung mass. No consolidative airspace process. No pleural effusion or pneumothorax. No acute osseous finding. 1.  No evidence of consolidation to suggest lobar pneumonia. 2.  RIGHT upper lobe and LEFT lower lobe lung masses, likely representing metastasis related to history of colon cancer. Correlate with clinical history and outside imaging (patient reportedly under treatment for colon cancer in [place] [id number]). Signed by Dr Eulogio Nuñez on 5/30/2021 1:27 PM    CTA PULMONARY W CONTRAST    Result Date: 5/30/2021  Exam: CTA PULMONARY W CONTRAST - 5/30/2021 1:43 PM Indication: Cough, hypoxia, history of cancer Comparison: None available. DLP: 1168 mGy cm. In order to have a CT radiation dose as low as reasonably achievable, Automated Exposure Control was utilized for adjustment of the mA and/or KV according to patient size. Findings: Evaluation for PE slightly limited by contrast bolus timing. No evidence of pulmonary embolus. RIGHT chest port with catheter tip in the SVC. Thoracic aorta is nonaneurysmal. No pericardial effusion. 6.8 x 4.8 cm RIGHT hilar/upper lobe mass with extensive airway and vascular encasement.  There is direct extension into the mediastinum with partial encasement and apparent direct invasion of the RIGHT mainstem bronchus and bronchus intermedius, intraluminal soft tissue and focal severe narrowing. Tree-in-bud nodularity throughout the RIGHT lower and RIGHT middle lobe is present, differential for which includes endobronchial splaying of neoplasm and postobstructive pneumonia. The RIGHT lung does remain aerated. 5.0 x 5.5 cm subpleural LEFT lower lobe mass. 11 mm LEFT lower lobe pulmonary nodule. No pleural effusion or pneumothorax. Enlarged mediastinal and bilateral hilar lymph nodes. No acute chest wall soft tissue abnormality. 11 mm hypodense caudate liver lesion. Bilateral adrenal gland masses. On the RIGHT, there is concern for tumor thrombus within the IVC. No aggressive osseous lesion identified. 1.  No evidence of pulmonary embolus. 2.  Extensive metastatic disease in the chest and upper abdomen. 6.8 cm RIGHT hilar/upper lobe mass with extensive vascular and airway encasement and direct mediastinal invasion with encasement and direct invasion of the RIGHT mainstem bronchus and bronchus intermedius. RIGHT lower lobe and middle lobe tree-in-bud nodularity is present with differential including endobronchial spread of neoplasm and postobstructive pneumonia. There is also a 5.5 cm mass in the subpleural LEFT lower lobe as well as an 11 mm LEFT lower lobe pulmonary nodule. Mediastinal and bilateral hilar lymphadenopathy is present. In the upper abdomen, at least one liver metastasis and bilateral adrenal gland masses are present. Suspected tumor thrombus in the IVC adjacent to the RIGHT adrenal gland. Signed by Dr Leroy White on 5/30/2021 3:22 PM      My interpretation: Bilateral lung metastasis. In the right hilar region there is a large mass invaded the right mainstem bronchus. Tree-in-bud nodularity downstream the mass. ASSESSMENT:    #Dyspnea-likely secondary to lung metastasis with large mass invading the right mainstem bronchus and associated pneumonia  Agree with continue antibiotics. Pulmonary has been consulted.   Question is if the patient is a candidate for bronchial stent placement. #Colon cancer (liver metastasis, adrenal metastasis, lung metastasis, retroperitoneal node metastasis) K-michael mutated, MSI stable  Status post modified FOLFOX x10 cycles with Avastin added on for cycle 5  Maintenance 5-FU/Avastin for 24 cycles. Recurrent disease March 2021. Currently on FOLFIRI    Postobstructive pneumonia-continue current antibiotics  Improving. PLAN:  Continue current supportive care  Continue antibiotics  Awaiting pulmonary evaluation  Request this consult [facility]  Schedule CT abdomen as outpatient    I have seen, examined and reviewed this patient medication list, appropriate labs and imaging studies. I reviewed relevant medical records and others physicians notes. I discussed the plans of care with the patient. I answered all the questions to the patients satisfaction. I have also reviewed the chief complaint (CC) and part of the history (History of Present Illness (HPI), Past Family Social History [facility]), or Review of Systems (ROS) and made changes when appropriated. (Please note that portions of this note were completed with a voice recognition program. Efforts were made to edit the dictations but occasionally words are mis-transcribed.)  The total time I spent to see the patient today includes at least one or more of the following: preparing to see the patient by reviewing prior tests, prior notes or other relevant information, performing appropriate independent examination and evaluation, counseling, documenting clinic information in the electronic medical record or other health records, independently interpreting results of tests, managing test results and communicating the results to the patient/family or caregiver.         Pricila Bob MD    05/31/21  8:43 AM

## 2021-05-31 NOTE — CONSULTS
Pulmonary and Critical Care Consult Note    [facility] Jackson    MRN# [id number]    Acct# [de-identified]  5/31/2021   2:13 PM CDT    Referring Jeffrey Thomas MD      Chief Complaint: Shortness of breath    Requesting physician: Dr. Katie Nails    Reason for consult: Extensive bilateral metastatic lung disease      HPI: We have been consulted to see this 64y.o. year old male born on 1960. The patient presented to the hospital due to increasing shortness of breath and cough productive of bloody sputum. He does have history of colon cancer with lung metastases diagnosed in 2017. He received chemotherapy and SBRT to his lung mets. He had been on chemotherapy but was not compliant with his oncology visits. He had a CT of the chest done in March that showed a recurrent disease in his lungs. He was started on chemotherapy again after being lost to follow-up since November 2020. CT of the chest at this facility showed left hilar mass with invasion of the right mainstem bronchus. He also had a large left lower lobe subpleural mass. I was asked to see him regarding the above.       Past Medical History      Past Medical History:   Diagnosis Date    Cancer ([facility] [address]     colon    Diabetes mellitus ([facility] [address]      SurgicalHistory  Past Surgical History:   Procedure Laterality Date    ABDOMEN SURGERY      COLONOSCOPY      ENDOSCOPY, COLON, DIAGNOSTIC      TUNNELED VENOUS PORT PLACEMENT       Allergies  Allergies   Allergen Reactions    Varenicline      Other reaction(s): Anger and Agression   -      Medications    insulin glargine, 32 Units, Subcutaneous, Nightly    insulin lispro, 8 Units, Subcutaneous, TID WC    cefTRIAXone (ROCEPHIN) IV, 1,000 mg, Intravenous, Q24H    azithromycin, 500 mg, Oral, Daily    sodium chloride flush, 5-40 mL, Intravenous, 2 times per day    enoxaparin, 40 mg, Subcutaneous, Daily    insulin lispro, 0-12 Units, Subcutaneous, TID WC    insulin lispro, 0-6 10  --    ALT 8  --    ALKPHOS 114  --    BILITOT 1.0  --    CALCIUM 8.9 8.6   PROBNP 813  --    TROPONINI <0.01  --    LACTA 1.4  --    INR  --  1.17   PROCAL  --  0.34*     No results for input(s): BC, LABGRAM, CULTRESP, BFCX in the last 72 hours. Radiograph: XR CHEST PORTABLE    Result Date: 5/30/2021  1. No evidence of consolidation to suggest lobar pneumonia. 2.  RIGHT upper lobe and LEFT lower lobe lung masses, likely representing metastasis related to history of colon cancer. Correlate with clinical history and outside imaging (patient reportedly under treatment for colon cancer in [place] [id number]). Signed by Dr Corinne Kelly on 5/30/2021 1:27 PM    CTA PULMONARY W CONTRAST    Result Date: 5/30/2021  1. No evidence of pulmonary embolus. 2.  Extensive metastatic disease in the chest and upper abdomen. 6.8 cm RIGHT hilar/upper lobe mass with extensive vascular and airway encasement and direct mediastinal invasion with encasement and direct invasion of the RIGHT mainstem bronchus and bronchus intermedius. RIGHT lower lobe and middle lobe tree-in-bud nodularity is present with differential including endobronchial spread of neoplasm and postobstructive pneumonia. There is also a 5.5 cm mass in the subpleural LEFT lower lobe as well as an 11 mm LEFT lower lobe pulmonary nodule. Mediastinal and bilateral hilar lymphadenopathy is present. In the upper abdomen, at least one liver metastasis and bilateral adrenal gland masses are present. Suspected tumor thrombus in the IVC adjacent to the RIGHT adrenal gland.  Signed by Dr Corinne Kelly on 5/30/2021 3:22 PM       My radiograph interpretation/independent review of imaging: Reviewed by myself    Problem list generated by [employer]:  Hospital Problems         Last Modified POA    * (Principal) CAP (community acquired pneumonia) 5/30/2021 Yes    Colon cancer metastasized to lung ([facility] [address] 5/30/2021 Yes    Diabetes ([facility] [address] 5/30/2021 Yes Pulmonary Assessment/plan:    1. Bilateral metastatic disease to the lung with right hilar mass with endobronchial extension into the right mainstem bronchus. The patient might be candidate for stent placement if he is not a candidate for radiation therapy. He does not seem to be very symptomatic from respiratory standpoint. He does have hemoptysis. Therefore if radiation therapy is still an option would consider initiating palliative radiation in order to reduce the tumor burden in the right hilar area and control hemoptysis. If radiation is not an option would consider pursuing covered stent option at a tertiary care center. 2. Hemoptysis due to the above. The patient has known metastatic disease to the lung from a primary colon cancer. 3. Dyspnea due to the above supportive care. He actually feels some better. Continue pedicle antibiotic therapy for now.         Electronically signed by Garry Lowery MD on 05/31/21 at 2:13 PM

## 2021-05-31 NOTE — PROGRESS NOTES
Physical Therapy    Facility/Department: [facility] SURG SERVICES  Initial Assessment    NAME: Ailyn Castillo  : 1960  MRN: [id number]    Date of Service: 2021    Discharge Recommendations:  Continue to assess pending progress, 24 hour supervision or assist, Patient would benefit from continued therapy after discharge        Assessment   Body structures, Functions, Activity limitations: Decreased functional mobility ; Decreased safe awareness;Decreased endurance;Decreased sensation;Decreased posture;Decreased balance  Assessment: Pt. will benefit from cont. PT to decrease impairments. Pt. a fall risk and should not attempt mobility on his own at this time due to coughing spells, low endurance and increased O2 needs with activity. Anticipate pt will need 24 hr care and O2 evaluation prior to d/c home. Pt. with decreased safety awareness and judgement for energy conservation and also impulsivity. Pt. needed v. cues for pursed lip breathing. Treatment Diagnosis: deconditioning  Prognosis: Guarded  Decision Making: Medium Complexity  PT Education: Goals;PT Role;Plan of Care;General Safety; Functional Mobility Training  Patient Education: use of call light for staff A  Barriers to Learning: decreased safety awareness, impulsivity  REQUIRES PT FOLLOW UP: Yes  Activity Tolerance  Activity Tolerance: Patient limited by fatigue;Patient limited by endurance; Other  Activity Tolerance: coughing       Patient Diagnosis(es): The primary encounter diagnosis was Primary colon cancer with metastasis to other site [facility]). Diagnoses of Malignant neoplasm of lung, unspecified laterality, unspecified part of lung ([address] and Pneumonia due to organism were also pertinent to this visit. has a past medical history of Cancer ([address] and Diabetes mellitus ([facility] [address] has a past surgical history that includes Tunneled venous port placement; Abdomen surgery;  Colonoscopy; and Endoscopy, colon, diagnostic. Restrictions  Restrictions/Precautions  Restrictions/Precautions: Fall Risk  Required Braces or Orthoses?: No  Vision/Hearing  Hearing: Exceptions to [facility]  Hearing Exceptions: Hard of hearing/hearing concerns     Subjective  General  Chart Reviewed: Yes  Patient assessed for rehabilitation services?: Yes  Response To Previous Treatment: Not applicable  Family / Caregiver Present: No  Referring Practitioner: Elsie Wilson MD  Referral Date : 05/30/21  Diagnosis: Primary colon cancer with metastatic to malignant neoplasm of lung, PNA  Follows Commands: Within Functional Limits  General Comment  Comments: RN, Bandar Taveras PT. Subjective  Subjective: Pt. willing to work with therapy. States he gets very short of breath just getting up to the bathroom.   Pain Screening  Patient Currently in Pain: Yes  Pain Assessment  Pain Assessment: 0-10  Pain Level: 6  Pain Type: Acute pain  Pain Location:  (lung)  Pain Orientation: Upper  Pain Descriptors: Sore  Functional Pain Assessment: Prevents or interferes some active activities and ADLs  Non-Pharmaceutical Pain Intervention(s): Ambulation/Increased Activity;Repositioned  Response to Pain Intervention: Patient Satisfied  Vital Signs  Patient Currently in Pain: Yes  Oxygen Therapy  O2 Device: Nasal cannula  O2 Flow Rate (L/min): 3 L/min  Pre Treatment Pain Screening  Intervention List: Patient able to continue with treatment    Orientation  Orientation  Overall Orientation Status: Within Functional Limits  Social/Functional History  Social/Functional History  Lives With: Daughter  Type of Home: Trailer  Home Access: Stairs to enter without rails  Entrance Stairs - Number of Steps: 4-5  Home Equipment:  (none)  Receives Help From: Family  ADL Assistance: Independent  Ambulation Assistance: Independent  Transfer Assistance: Independent  Active : Yes (can drive but daughter drives more)  Leisure & Hobbies: takes walks with granddaughter  Additional

## 2021-05-31 NOTE — PROGRESS NOTES
Called consult to pulmonology group answering service said no one is taking call till after the holiday weekend Electronically signed by Monica Ortega RN on 5/30/2021 at 7:25 PM

## 2021-06-01 ENCOUNTER — CLINICAL DOCUMENTATION (OUTPATIENT)
Dept: HEMATOLOGY | Age: 61
End: 2021-06-01

## 2021-06-01 DIAGNOSIS — C78.00 COLON CANCER METASTASIZED TO LUNG (HCC): Primary | ICD-10-CM

## 2021-06-01 DIAGNOSIS — C18.9 COLON CANCER METASTASIZED TO LUNG (HCC): Primary | ICD-10-CM

## 2021-06-01 PROBLEM — Z51.5 PALLIATIVE CARE PATIENT: Status: ACTIVE | Noted: 2021-06-01

## 2021-06-01 PROBLEM — I10 ESSENTIAL HYPERTENSION: Status: ACTIVE | Noted: 2019-05-06

## 2021-06-01 PROBLEM — E78.5 HYPERLIPIDEMIA: Status: ACTIVE | Noted: 2017-07-26

## 2021-06-01 LAB
ANION GAP SERPL CALCULATED.3IONS-SCNC: 8 MMOL/L (ref 7–19)
BASOPHILS ABSOLUTE: 0 K/UL (ref 0–0.2)
BASOPHILS RELATIVE PERCENT: 0.5 % (ref 0–1)
BUN BLDV-MCNC: 8 MG/DL (ref 6–20)
CALCIUM SERPL-MCNC: 8.4 MG/DL (ref 8.6–10)
CEA: 33.7 NG/ML (ref 0–4.7)
CHLORIDE BLD-SCNC: 101 MMOL/L (ref 98–111)
CO2: 32 MMOL/L (ref 22–29)
CREAT SERPL-MCNC: 1.1 MG/DL (ref 0.5–1.2)
EKG P AXIS: 60 DEGREES
EKG P-R INTERVAL: 136 MS
EKG Q-T INTERVAL: 344 MS
EKG QRS DURATION: 92 MS
EKG QTC CALCULATION (BAZETT): 408 MS
EKG T AXIS: 64 DEGREES
EOSINOPHILS ABSOLUTE: 0.1 K/UL (ref 0–0.6)
EOSINOPHILS RELATIVE PERCENT: 1.9 % (ref 0–5)
GFR AFRICAN AMERICAN: >59
GFR NON-AFRICAN AMERICAN: >60
GLUCOSE BLD-MCNC: 134 MG/DL (ref 70–99)
GLUCOSE BLD-MCNC: 141 MG/DL (ref 74–109)
GLUCOSE BLD-MCNC: 159 MG/DL (ref 70–99)
GLUCOSE BLD-MCNC: 187 MG/DL (ref 70–99)
GLUCOSE BLD-MCNC: 94 MG/DL (ref 70–99)
HCT VFR BLD CALC: 46.2 % (ref 42–52)
HEMOGLOBIN: 14.7 G/DL (ref 14–18)
IMMATURE GRANULOCYTES #: 0.1 K/UL
LYMPHOCYTES ABSOLUTE: 0.5 K/UL (ref 1.1–4.5)
LYMPHOCYTES RELATIVE PERCENT: 8.9 % (ref 20–40)
MAGNESIUM: 1.9 MG/DL (ref 1.6–2.6)
MCH RBC QN AUTO: 28.8 PG (ref 27–31)
MCHC RBC AUTO-ENTMCNC: 31.8 G/DL (ref 33–37)
MCV RBC AUTO: 90.4 FL (ref 80–94)
MONOCYTES ABSOLUTE: 0.8 K/UL (ref 0–0.9)
MONOCYTES RELATIVE PERCENT: 13.6 % (ref 0–10)
NEUTROPHILS ABSOLUTE: 4.2 K/UL (ref 1.5–7.5)
NEUTROPHILS RELATIVE PERCENT: 74.2 % (ref 50–65)
PDW BLD-RTO: 17.5 % (ref 11.5–14.5)
PERFORMED ON: ABNORMAL
PERFORMED ON: NORMAL
PLATELET # BLD: 179 K/UL (ref 130–400)
PMV BLD AUTO: 9.6 FL (ref 9.4–12.4)
POTASSIUM REFLEX MAGNESIUM: 3.5 MMOL/L (ref 3.5–5)
RBC # BLD: 5.11 M/UL (ref 4.7–6.1)
SODIUM BLD-SCNC: 141 MMOL/L (ref 136–145)
WBC # BLD: 5.7 K/UL (ref 4.8–10.8)

## 2021-06-01 PROCEDURE — 80048 BASIC METABOLIC PNL TOTAL CA: CPT

## 2021-06-01 PROCEDURE — 6360000002 HC RX W HCPCS: Performed by: STUDENT IN AN ORGANIZED HEALTH CARE EDUCATION/TRAINING PROGRAM

## 2021-06-01 PROCEDURE — 2580000003 HC RX 258: Performed by: STUDENT IN AN ORGANIZED HEALTH CARE EDUCATION/TRAINING PROGRAM

## 2021-06-01 PROCEDURE — 82947 ASSAY GLUCOSE BLOOD QUANT: CPT

## 2021-06-01 PROCEDURE — 2700000000 HC OXYGEN THERAPY PER DAY

## 2021-06-01 PROCEDURE — 36415 COLL VENOUS BLD VENIPUNCTURE: CPT

## 2021-06-01 PROCEDURE — 99223 1ST HOSP IP/OBS HIGH 75: CPT | Performed by: PHYSICIAN ASSISTANT

## 2021-06-01 PROCEDURE — 93010 ELECTROCARDIOGRAM REPORT: CPT | Performed by: INTERNAL MEDICINE

## 2021-06-01 PROCEDURE — 85025 COMPLETE CBC W/AUTO DIFF WBC: CPT

## 2021-06-01 PROCEDURE — 97166 OT EVAL MOD COMPLEX 45 MIN: CPT

## 2021-06-01 PROCEDURE — 6370000000 HC RX 637 (ALT 250 FOR IP): Performed by: STUDENT IN AN ORGANIZED HEALTH CARE EDUCATION/TRAINING PROGRAM

## 2021-06-01 PROCEDURE — 97116 GAIT TRAINING THERAPY: CPT

## 2021-06-01 PROCEDURE — 1210000000 HC MED SURG R&B

## 2021-06-01 PROCEDURE — 99232 SBSQ HOSP IP/OBS MODERATE 35: CPT | Performed by: INTERNAL MEDICINE

## 2021-06-01 PROCEDURE — 82378 CARCINOEMBRYONIC ANTIGEN: CPT

## 2021-06-01 PROCEDURE — 94640 AIRWAY INHALATION TREATMENT: CPT

## 2021-06-01 PROCEDURE — 83735 ASSAY OF MAGNESIUM: CPT

## 2021-06-01 RX ORDER — HYDROCODONE BITARTRATE AND ACETAMINOPHEN 5; 325 MG/1; MG/1
1 TABLET ORAL EVERY 4 HOURS PRN
Status: DISCONTINUED | OUTPATIENT
Start: 2021-06-01 | End: 2021-06-03 | Stop reason: HOSPADM

## 2021-06-01 RX ADMIN — IPRATROPIUM BROMIDE AND ALBUTEROL SULFATE 1 AMPULE: .5; 3 SOLUTION RESPIRATORY (INHALATION) at 15:16

## 2021-06-01 RX ADMIN — IPRATROPIUM BROMIDE AND ALBUTEROL SULFATE 1 AMPULE: .5; 3 SOLUTION RESPIRATORY (INHALATION) at 18:48

## 2021-06-01 RX ADMIN — IPRATROPIUM BROMIDE AND ALBUTEROL SULFATE 1 AMPULE: .5; 3 SOLUTION RESPIRATORY (INHALATION) at 11:22

## 2021-06-01 RX ADMIN — IPRATROPIUM BROMIDE AND ALBUTEROL SULFATE 1 AMPULE: .5; 3 SOLUTION RESPIRATORY (INHALATION) at 07:00

## 2021-06-01 RX ADMIN — SODIUM CHLORIDE, PRESERVATIVE FREE 1000 MG: 5 INJECTION INTRAVENOUS at 08:43

## 2021-06-01 RX ADMIN — INSULIN GLARGINE 32 UNITS: 100 INJECTION, SOLUTION SUBCUTANEOUS at 20:53

## 2021-06-01 RX ADMIN — ACETAMINOPHEN 650 MG: 325 TABLET ORAL at 19:45

## 2021-06-01 RX ADMIN — ACETAMINOPHEN 650 MG: 325 TABLET ORAL at 04:30

## 2021-06-01 RX ADMIN — ENOXAPARIN SODIUM 40 MG: 40 INJECTION SUBCUTANEOUS at 08:43

## 2021-06-01 RX ADMIN — INSULIN LISPRO 2 UNITS: 100 INJECTION, SOLUTION INTRAVENOUS; SUBCUTANEOUS at 12:28

## 2021-06-01 RX ADMIN — AZITHROMYCIN 500 MG: 250 TABLET, FILM COATED ORAL at 08:43

## 2021-06-01 RX ADMIN — ACETAMINOPHEN 650 MG: 325 TABLET ORAL at 12:25

## 2021-06-01 RX ADMIN — SODIUM CHLORIDE, PRESERVATIVE FREE 10 ML: 5 INJECTION INTRAVENOUS at 08:43

## 2021-06-01 RX ADMIN — SODIUM CHLORIDE, PRESERVATIVE FREE 10 ML: 5 INJECTION INTRAVENOUS at 21:00

## 2021-06-01 ASSESSMENT — PAIN DESCRIPTION - PAIN TYPE
TYPE: ACUTE PAIN
TYPE: ACUTE PAIN

## 2021-06-01 ASSESSMENT — ENCOUNTER SYMPTOMS
ABDOMINAL PAIN: 0
EYE REDNESS: 0
CONSTIPATION: 0
COUGH: 1
BLOOD IN STOOL: 0
GASTROINTESTINAL NEGATIVE: 1
SORE THROAT: 0
SHORTNESS OF BREATH: 1
VOMITING: 0
EYE DISCHARGE: 0
WHEEZING: 0
EYE PAIN: 0
NAUSEA: 0
EYES NEGATIVE: 1
DIARRHEA: 0
BACK PAIN: 0

## 2021-06-01 ASSESSMENT — PAIN DESCRIPTION - FREQUENCY
FREQUENCY: INTERMITTENT
FREQUENCY: INTERMITTENT

## 2021-06-01 ASSESSMENT — PAIN DESCRIPTION - DESCRIPTORS
DESCRIPTORS: ACHING;DISCOMFORT;SORE
DESCRIPTORS: SORE;DISCOMFORT

## 2021-06-01 ASSESSMENT — PAIN SCALES - GENERAL
PAINLEVEL_OUTOF10: 5
PAINLEVEL_OUTOF10: 2
PAINLEVEL_OUTOF10: 1
PAINLEVEL_OUTOF10: 4
PAINLEVEL_OUTOF10: 6
PAINLEVEL_OUTOF10: 6
PAINLEVEL_OUTOF10: 0

## 2021-06-01 ASSESSMENT — PAIN DESCRIPTION - ONSET: ONSET: ON-GOING

## 2021-06-01 ASSESSMENT — PAIN DESCRIPTION - PROGRESSION: CLINICAL_PROGRESSION: NOT CHANGED

## 2021-06-01 ASSESSMENT — PAIN - FUNCTIONAL ASSESSMENT: PAIN_FUNCTIONAL_ASSESSMENT: PREVENTS OR INTERFERES SOME ACTIVE ACTIVITIES AND ADLS

## 2021-06-01 ASSESSMENT — PAIN DESCRIPTION - LOCATION
LOCATION: RIB CAGE
LOCATION: FOOT

## 2021-06-01 ASSESSMENT — PAIN DESCRIPTION - ORIENTATION: ORIENTATION: UPPER

## 2021-06-01 NOTE — PROGRESS NOTES
Visited with pt to assist in completing an AD/LW. Pt was prepared with his decision maker's name and advance care planning decisions. This  provided documents for pt and discussed with pt. Pt named his decision maker and made advance care planning decisions. Pt initialed and signed the document and this  witnessed and notarized the document. Original and copy was provided for pt, copy went into pt's soft chart, copy goes to MR. Pt expressed gratitude for assistance and spiritual care.     Electronically signed by Remedios Espinosa on 6/1/2021 at 3:09 PM

## 2021-06-01 NOTE — PROGRESS NOTES
Patient name: Montserrat Valenzuela  Patient : 1960  6:37 AM  ROOM 511  Patient Active Problem List   Diagnosis Code    CAP (community acquired pneumonia) J18.9    Colon cancer metastasized to lung ([facility] C18.9, C78.00    Diabetes ([facility] [address] E11.9     Portions of this note have been copied forward, however, changed to reflect the most current clinical status of this patient. Subjective: Continues to have episodes of hemoptysis. Currently no acute distress and resting. HISTORY OF PRESENT ILLNESS:   Diagnosis  · Metastatic colonic adenocarcinoma, 2017  · pT3N2a, stage IIIB  · Pulmonary embolism, May 2019  · K-michael mutated  · MSI stable  · PIK3CA mutated  · Liver metastasis, 2018     Treatment summary  · Modified FOLFOX 6  · 5-FU/leucovorin  · SBRT right upper lobe lung mass  · FOLFIRI        Target lesions  · Liver metastasis  · Right upper lobe/left lower lobe lung metastasis  · Adrenal metastasis bilaterally     Heather Donis was first seen by Dr. Arianna Zhao on 2021. Patient is a pleasant 64years old male who is a patient of Dr. Izzy Keene oncologist at [facility] [place] in [facility]. Patient was initially diagnosed in 2017 when he was hospitalized for colitis. CT abdomen showed thickening of the lower right colon. A colonoscopy was performed in 2017 that showed a large fungating mass in the cecum and proximal ascending colon. Biopsy was consistent with invasive carcinoma with mucinous features. Staging CT scans showed a left lower lobe micronodule measuring 3 mm and a 1.6 cm left adrenal nodule in 2017. The patient underwent a laparoscopic open right colectomy, partial omentectomy and partial peritonectomy on 10/17/2017. Pathology consistent with differentiated mucinous adenocarcinoma involving the cecal pouch, ileocecal valve and extending intraluminally into the terminal ileus.   For tumor deposits present. Final pathologic staging consistent with K-michael mutated, for positive lymph nodes [id number], stage IIIB colon cancer. He received adjuvant modified FOLFOX 6 started November 2017. Unfortunately biopsy-proven metastatic disease to the liver documented in January 2018. Avastin was added to his regimen. He completed 10 cycles of oxaliplatin which was discontinued. Avastin was added on for cycle #5. He then completed 32 cycles of 5-FU/Avastin. He developed pulmonary embolism and was started on Lovenox and later on switched to Eliquis in August 2019. In July 2020 he had disease progression and was started on FOLFIRI. He received hyperfractionated RT to left lower lobe nodule July 2019. He was last seen by his oncologist on 05/12/2021. Patient was considering moving care to [facility] where he lives with his daughter. Apparently his last chemotherapy was in November 2020 and the patient was lost to follow-up. He was seen again in February 2021 and received a cycle of FOLFIRI. Poor compliance to his medical visit with his oncologist due to several logistic problems. 3/5/2021-CT chest performed at the [facility] showed the previously seen right upper lobe mass has significantly enlarged currently measuring 5.0 x 5.5 x 6.9 cm and extending into and at some levels is indistinct from the right bronchovascular structures with also some narrowing of the pulmonary arteries of this region. Hazel Beers are multiple small satellite nodular opacities surrounding this mass. The previously seen left lower lobe mass has significantly enlarged currently measuring 5.0 x 4.7 x 4.7 cm. Hazel Beers is a newly developed small left lower lobe nodule measuring 7.5 mm.   3/5/2021-CT abdomen pelvis showed bilateral adrenal nodules with a left adrenal nodule measuring 3.9 x 1.6 cm. The right adrenal nodule measured 1.8 cm.   Subcentimeter low-density liver lesions and additional subcentimeter hyperenhancing focus in the liver, change. Possible wall thickening of the distal colon and rectum. Status post right hemicolectomy. 5/12/2021-he was last seen by his oncologist.  Patient plans to move care to [place].  5/14/2021-last infusion of palliative FOLFIRI  5/30/2021-the patient presented to the ED department at [facility] with complaints of increased short of breath over the last past month. He also had productive cough with greenish sputum. Denied any fever or chills. 6/1/2021-CEA 33.7             Review of Systems   Constitutional: Positive for fatigue. Negative for chills, diaphoresis and fever. HENT: Negative. Negative for congestion, ear pain, hearing loss, nosebleeds, sore throat and tinnitus. Eyes: Negative. Negative for pain, discharge and redness. Respiratory: Positive for cough (Hemoptysis) and shortness of breath. Negative for wheezing. Cardiovascular: Negative. Negative for chest pain, palpitations and leg swelling. Gastrointestinal: Negative. Negative for abdominal pain, blood in stool, constipation, diarrhea, nausea and vomiting. Endocrine: Negative for polydipsia. Genitourinary: Negative for dysuria, flank pain, frequency, hematuria and urgency. Musculoskeletal: Negative. Negative for back pain, myalgias and neck pain. Skin: Negative. Negative for rash. Neurological: Positive for weakness. Negative for dizziness, tremors, seizures and headaches. Hematological: Does not bruise/bleed easily. Psychiatric/Behavioral: Negative. The patient is not nervous/anxious.         Current Pain Assessment  Pain Assessment  Pain Assessment: 0-10  Pain Level: 6  Patient's Stated Pain Goal: No pain  Pain Type: Acute pain  Pain Location: Rib cage  Pain Orientation: Upper  Pain Descriptors: Sore  Pain Frequency: Intermittent (when coughing)  Pain Onset: On-going  Clinical Progression: Gradually worsening  Functional Pain Assessment: Prevents or interferes some active activities and ADLs  Non-Pharmaceutical Pain Intervention(s): Rest, Repositioned  Response to Pain Intervention: Patient Satisfied    OBJECTIVE:  VITALS: BP (!) 150/91   Pulse 79   Temp 98.1 °F (36.7 °C) (Temporal)   Resp 20   Ht 6' 1\" (1.854 m)   Wt 200 lb 14.4 oz (91.1 kg)   SpO2 90%   BMI 26.51 kg/m²   I&O:     Intake/Output Summary (Last 24 hours) at 6/1/2021 0637  Last data filed at 5/31/2021 1845  Gross per 24 hour   Intake 950 ml   Output --   Net 950 ml         Physical Exam  Vitals reviewed. Constitutional:       General: He is not in acute distress. Appearance: He is well-developed. He is not diaphoretic. HENT:      Head: Normocephalic and atraumatic. Mouth/Throat:      Pharynx: Uvula midline. Tonsils: No tonsillar exudate. Eyes:      General: Lids are normal. No scleral icterus. Right eye: No discharge. Left eye: No discharge. Conjunctiva/sclera: Conjunctivae normal.      Pupils: Pupils are equal, round, and reactive to light. Neck:      Thyroid: No thyroid mass or thyromegaly. Vascular: No JVD. Trachea: Trachea normal. No tracheal deviation. Cardiovascular:      Rate and Rhythm: Normal rate and regular rhythm. Heart sounds: Normal heart sounds. No murmur heard. No friction rub. No gallop. Pulmonary:      Effort: Pulmonary effort is normal. No respiratory distress. Breath sounds: Decreased breath sounds present. No wheezing or rales. Chest:      Chest wall: No tenderness. Abdominal:      General: Bowel sounds are normal. There is no distension. Palpations: Abdomen is soft. There is no mass. Tenderness: There is no abdominal tenderness. There is no guarding or rebound. Hernia: No hernia is present. Musculoskeletal:         General: No tenderness or deformity. Cervical back: Normal range of motion and neck supple. Comments: Range of motion within normal limits x4 extremities   Skin:     General: Skin is warm. Coloration: Skin is not pale. Findings: No erythema or rash. Neurological:      Mental Status: He is alert and oriented to person, place, and time. Cranial Nerves: No cranial nerve deficit. Coordination: Coordination normal.   Psychiatric:         Behavior: Behavior normal.         Thought Content: Thought content normal.         BMP:   Recent Labs     05/31/21  0334 06/01/21  0357    141   K 3.7 3.5    101   CO2 30* 32*   BUN 8 8   CREATININE 1.0 1.1   GLUCOSE 122* 141*   CALCIUM 8.6 8.4*     Recent Labs     06/01/21  0357 05/31/21  0334 05/30/21  1240   WBC 5.7 5.7 5.6   HGB 14.7 15.0 16.8   HCT 46.2 46.7 50.8   MCV 90.4 90.2 88.2    152 183     CMP:    Recent Labs     05/30/21  1240 05/31/21  0334 06/01/21 0357    142 141   K 3.7 3.7 3.5    103 101   CO2 24 30* 32*   BUN 8 8 8   CREATININE 0.9 1.0 1.1   GFRAA >59 >59 >59   LABGLOM >60 >60 >60   GLUCOSE 114* 122* 141*   PROT 6.5*  --   --    LABALBU 3.7  --   --    CALCIUM 8.9 8.6 8.4*   BILITOT 1.0  --   --    ALKPHOS 114  --   --    AST 10  --   --    ALT 8  --   --        30Day lookback of cultures:    Blood Culture Recent:   Recent Labs     05/30/21  1240   BC No Growth to date. Any change in status will be called. Gram Stain Recent: No results for input(s): LABGRAM in the last 720 hours. Resp Culture Recent: No results for input(s): CULTRESP in the last 720 hours. Body Fluid Recent : No results for input(s): BFCX in the last 720 hours. MRSA Recent : No results for input(s): [facility] SYSTEM in the last 720 hours. Urine Culture Recent : No results for input(s): LABURIN in the last 720 hours. Organism Recent : No results for input(s): ORG in the last 720 hours. Radiology:   XR CHEST PORTABLE    Result Date: 5/30/2021  Exam: XR CHEST PORTABLE - 5/30/2021 11:55 AM Indication: Shortness of breath, history of colon cancer Comparison: None available. Findings: Cardiac silhouette is normal in size.  RIGHT chest wall port catheter tip overlying the SVC. 6.5 cm LEFT lower lobe lung mass. 5.3 cm RIGHT suprahilar lung mass. No consolidative airspace process. No pleural effusion or pneumothorax. No acute osseous finding. 1.  No evidence of consolidation to suggest lobar pneumonia. 2.  RIGHT upper lobe and LEFT lower lobe lung masses, likely representing metastasis related to history of colon cancer. Correlate with clinical history and outside imaging (patient reportedly under treatment for colon cancer in [place] [id number]). Signed by Dr Marry Lawson on 5/30/2021 1:27 PM    CTA PULMONARY W CONTRAST    Result Date: 5/30/2021  Exam: CTA PULMONARY W CONTRAST - 5/30/2021 1:43 PM Indication: Cough, hypoxia, history of cancer Comparison: None available. DLP: 1168 mGy cm. In order to have a CT radiation dose as low as reasonably achievable, Automated Exposure Control was utilized for adjustment of the mA and/or KV according to patient size. Findings: Evaluation for PE slightly limited by contrast bolus timing. No evidence of pulmonary embolus. RIGHT chest port with catheter tip in the SVC. Thoracic aorta is nonaneurysmal. No pericardial effusion. 6.8 x 4.8 cm RIGHT hilar/upper lobe mass with extensive airway and vascular encasement. There is direct extension into the mediastinum with partial encasement and apparent direct invasion of the RIGHT mainstem bronchus and bronchus intermedius, intraluminal soft tissue and focal severe narrowing. Tree-in-bud nodularity throughout the RIGHT lower and RIGHT middle lobe is present, differential for which includes endobronchial splaying of neoplasm and postobstructive pneumonia. The RIGHT lung does remain aerated. 5.0 x 5.5 cm subpleural LEFT lower lobe mass. 11 mm LEFT lower lobe pulmonary nodule. No pleural effusion or pneumothorax. Enlarged mediastinal and bilateral hilar lymph nodes. No acute chest wall soft tissue abnormality. 11 mm hypodense caudate liver lesion.  Bilateral adrenal gland masses. On the RIGHT, there is concern for tumor thrombus within the IVC. No aggressive osseous lesion identified. 1.  No evidence of pulmonary embolus. 2.  Extensive metastatic disease in the chest and upper abdomen. 6.8 cm RIGHT hilar/upper lobe mass with extensive vascular and airway encasement and direct mediastinal invasion with encasement and direct invasion of the RIGHT mainstem bronchus and bronchus intermedius. RIGHT lower lobe and middle lobe tree-in-bud nodularity is present with differential including endobronchial spread of neoplasm and postobstructive pneumonia. There is also a 5.5 cm mass in the subpleural LEFT lower lobe as well as an 11 mm LEFT lower lobe pulmonary nodule. Mediastinal and bilateral hilar lymphadenopathy is present. In the upper abdomen, at least one liver metastasis and bilateral adrenal gland masses are present. Suspected tumor thrombus in the IVC adjacent to the RIGHT adrenal gland.  Signed by Dr Duane Laura on 5/30/2021 3:22 PM      Medications  Current Facility-Administered Medications   Medication Dose Route Frequency Provider Last Rate Last Admin    insulin glargine (LANTUS) injection vial 32 Units  32 Units Subcutaneous Nightly Colton Parson MD   12 Units at 05/30/21 2115    insulin lispro (HUMALOG) injection vial 8 Units  8 Units Subcutaneous TID WC Colton Parson MD   8 Units at 05/31/21 1744    prochlorperazine (COMPAZINE) tablet 10 mg  10 mg Oral Q6H PRN Colton Parson MD        cefTRIAXone (ROCEPHIN) 1,000 mg in sodium chloride (PF) 10 mL IV syringe  1,000 mg Intravenous Q24H Colton Parson MD   1,000 mg at 05/31/21 0956    azithromycin (ZITHROMAX) tablet 500 mg  500 mg Oral Daily Colton Parson MD   500 mg at 05/31/21 0955    sodium chloride flush 0.9 % injection 5-40 mL  5-40 mL Intravenous 2 times per day Colton Parson MD   10 mL at 05/31/21 2130    sodium chloride flush 0.9 % injection 5-40 mL  5-40 mL Intravenous PRN Colton Parson MD        0.9 % sodium chloride infusion  25 mL Intravenous PRN Blessing Small MD        enoxaparin (LOVENOX) injection 40 mg  40 mg Subcutaneous Daily Blessing Small MD   40 mg at 05/31/21 0955    promethazine (PHENERGAN) tablet 12.5 mg  12.5 mg Oral Q6H PRN Blessing Small MD        Or    [facility]ans[facility] injection 4 mg  4 mg Intravenous Q6H PRN Blessing Small MD   4 mg at 05/30/21 1625    polyethylene glycol (GLYCOLAX) packet 17 g  17 g Oral Daily PRN Blessing Small MD        acetaminophen (TYLENOL) tablet 650 mg  650 mg Oral Q6H PRN Blessing Small MD   650 mg at 06/01/21 0430    Or    acetaminophen (TYLENOL) suppository 650 mg  650 mg Rectal Q6H PRN Blessing Small MD        0.9 % sodium chloride infusion   Intravenous Continuous Blessing Small  mL/hr at 05/30/21 1640 New Bag at 05/30/21 1640    potassium chloride (KLOR-CON M) extended release tablet 40 mEq  40 mEq Oral PRN Blessing Small MD        Or    potassium bicarb-citric acid (EFFER-K) effervescent tablet 40 mEq  40 mEq Oral PRN Blessing Small MD        Or    potassium chloride 10 mEq/100 mL IVPB (Peripheral Line)  10 mEq Intravenous PRN Blessing Small MD        magnesium sulfate 2000 mg in 50 mL IVPB premix  2,000 mg Intravenous PRN Blessing Small MD        insulin lispro (HUMALOG) injection vial 0-12 Units  0-12 Units Subcutaneous TID WC Blessing Small MD   2 Units at 05/31/21 1300    insulin lispro (HUMALOG) injection vial 0-6 Units  0-6 Units Subcutaneous Nightly Blessing Small MD   1 Units at 05/30/21 2117    ipratropium-albuterol (DUONEB) nebulizer solution 1 ampule  1 ampule Inhalation Q4H WA Blessing Small MD   1 ampule at 05/31/21 1851       Allergies  Varenicline    ASSESSMENT:     #Dyspnea-likely secondary to lung metastasis with large mass invading the right mainstem bronchus and associated pneumonia  Agree with continue antibiotics.   Question is if the patient is a candidate for bronchial stent placement.  assisting - Recommended considering palliative radiation in order to reduce the tumor burden in the right hilar area and control hemoptysis. If not candidate for radiation could possibly be candidate for stent placement at tertiary center. #Colon cancer (liver metastasis, adrenal metastasis, lung metastasis, retroperitoneal node metastasis) K-michael mutated, MSI stable  Status post modified FOLFOX x10 cycles with Avastin added on for cycle 5  Maintenance 5-FU/Avastin for 24 cycles. Recurrent disease March 2021. Currently on FOLFIRI     CEA 33.7 on 6/1/2021    Postobstructive pneumonia-continue current antibiotics  Improving.     PLAN:  Continue current supportive care  Continue antibiotics  Appreciate pulmonology's assistance  Anticipating palliative care consultation  Schedule CT abdomen and pelvis in the outpatient setting  Follow-up in clinic with Dr. Ganga Anders next week to initiate palliative chemotherapy with FOLFIRI  Will contact radiation oncology. I have seen, examined and reviewed this patient medication list, appropriate labs and imaging studies. I reviewed relevant medical records and others physicians notes. I discussed the plans of care with the patient. I answered all the questions to the patients satisfaction. I have also reviewed the chief complaint (CC) and part of the history (History of Present Illness (HPI), Past Family Social History [facility]), or Review of Systems (ROS) and made changes when appropriated.        (Please note that portions of this note were completed with a voice recognition program. Efforts were made to edit the dictations but occasionally words are mis-transcribed.)        ZHEN Das    06/01/21  6:37 AM

## 2021-06-01 NOTE — PROGRESS NOTES
Daily Progress Note    Date:2021  Patient: Josie Phillips  : 1960  YKT:[id number]  CODE:Full Code No additional code details  Tino Becker MD, MD    Admit Date: 2021 11:40 AM   LOS: 2 days       Subjective:   Continues with productive cough with some hemoptysis. No fevers/chills overnight. Hospital course: 61-year-old male with diabetes, metastatic colon cancer with metastatic disease to lungs, liver, adrenal glands and retroperitoneal node followed by oncology in [facility] on chemotherapy regimen every 2 weeks, history of surgical resection radiation therapy, recently moved to [place], presented on  with progressive dyspnea and cough over 2 months with subsequent severe worsening symptoms acutely with intermittently productive cough with mucus and scant hemoptysis. Further work-up on CT chest negative for PE, but showed extensive metastatic lung disease with upper lobe mass with invasion into the mediastinum and invasion into the right mainstem bronchus with postobstructive pneumonia involving RML and RLL. Treated with Rocephin and azithromycin. Evaluated by oncology service. Evaluated by pulmonary service who notes that he could potentially be a candidate for endobronchial stent placement if he is not a candidate for radiation therapy, however if radiation is not an option could consider evaluation for endobronchial stent at a tertiary care center.         Review of Systems    Comprehensive ROS completed and is negative except as otherwise noted      Objective:      Vital signs in last 24 hours:  Patient Vitals for the past 24 hrs:   BP Temp Temp src Pulse Resp SpO2 Weight   21 0723 127/79 96.8 °F (36 °C) -- 86 20 94 % --   21 0242 (!) 150/91 98.1 °F (36.7 °C) Temporal 79 20 90 % 200 lb 14.4 oz (91.1 kg)   21 2213 (!) 150/82 97.6 °F (36.4 °C) Temporal 95 20 90 % --   21 1845 115/72 99.7 °F (37.6 °C) Temporal 74 22 (!) 88 % --   21 1409 112/73 97.8 °F (36.6 °C) Temporal 109 18 91 % --   05/31/21 1314 -- -- -- -- -- 92 % --   05/31/21 1310 133/81 97.5 °F (36.4 °C) Temporal 110 18 (!) 87 % --   05/31/21 1013 126/84 97.4 °F (36.3 °C) Temporal 102 16 90 % --       Physical exam     General: Chronically ill-appearing, no apparent distress  PSYCH: Normal mood  EYES: Symmetrical, no scleral icterus  Head: Normocephalic, atraumatic  LUNGS: Diminished breath sounds of RLL  CARDIOVASCULAR:  Normal rhythm, peripheral pulses equal  GI/ABDOMEN: Soft, non-tender, non-distended, no guarding or rebound. Bowel sounds are normal.  SKIN: Warm and dry.    NEUROLOGICAL: No focal neurologic deficits  EXTREMITIES: No cyanosis or edema    Lab Review   Recent Results (from the past 24 hour(s))   POCT Glucose    Collection Time: 05/31/21 11:33 AM   Result Value Ref Range    POC Glucose 169 (H) 70 - 99 mg/dl    Performed on AccuChek    POCT Glucose    Collection Time: 05/31/21  4:49 PM   Result Value Ref Range    POC Glucose 138 (H) 70 - 99 mg/dl    Performed on AccuChek    POCT Glucose    Collection Time: 05/31/21  8:48 PM   Result Value Ref Range    POC Glucose 128 (H) 70 - 99 mg/dl    Performed on AccuChek    Basic Metabolic Panel w/ Reflex to MG    Collection Time: 06/01/21  3:57 AM   Result Value Ref Range    Sodium 141 136 - 145 mmol/L    Potassium reflex Magnesium 3.5 3.5 - 5.0 mmol/L    Chloride 101 98 - 111 mmol/L    CO2 32 (H) 22 - 29 mmol/L    Anion Gap 8 7 - 19 mmol/L    Glucose 141 (H) 74 - 109 mg/dL    BUN 8 6 - 20 mg/dL    CREATININE 1.1 0.5 - 1.2 mg/dL    GFR Non-African American >60 >60    GFR African American >59 >59    Calcium 8.4 (L) 8.6 - 10.0 mg/dL   CBC auto differential    Collection Time: 06/01/21  3:57 AM   Result Value Ref Range    WBC 5.7 4.8 - 10.8 K/uL    RBC 5.11 4.70 - 6.10 M/uL    Hemoglobin 14.7 14.0 - 18.0 g/dL    Hematocrit 46.2 42.0 - 52.0 %    MCV 90.4 80.0 - 94.0 fL    MCH 28.8 27.0 - 31.0 pg    MCHC 31.8 (L) 33.0 - 37.0 g/dL    RDW 17.5 (H) 11.5 - 14.5 %    Platelets 714 419 - 487 K/uL    MPV 9.6 9.4 - 12.4 fL    Neutrophils % 74.2 (H) 50.0 - 65.0 %    Lymphocytes % 8.9 (L) 20.0 - 40.0 %    Monocytes % 13.6 (H) 0.0 - 10.0 %    Eosinophils % 1.9 0.0 - 5.0 %    Basophils % 0.5 0.0 - 1.0 %    Neutrophils Absolute 4.2 1.5 - 7.5 K/uL    Immature Granulocytes # 0.1 K/uL    Lymphocytes Absolute 0.5 (L) 1.1 - 4.5 K/uL    Monocytes Absolute 0.80 0.00 - 0.90 K/uL    Eosinophils Absolute 0.10 0.00 - 0.60 K/uL    Basophils Absolute 0.00 0.00 - 0.20 K/uL   CEA    Collection Time: 06/01/21  3:57 AM   Result Value Ref Range    CEA 33.7 (H) 0.0 - 4.7 ng/mL   Magnesium    Collection Time: 06/01/21  3:57 AM   Result Value Ref Range    Magnesium 1.9 1.6 - 2.6 mg/dL   POCT Glucose    Collection Time: 06/01/21  7:44 AM   Result Value Ref Range    POC Glucose 134 (H) 70 - 99 mg/dl    Performed on AccuChek        I/O last 3 completed shifts: In: 950 [P.O.:950]  Out: -   No intake/output data recorded.       Current Facility-Administered Medications:     insulin glargine (LANTUS) injection vial 32 Units, 32 Units, Subcutaneous, Nightly, Jayleen Avina MD, 12 Units at 05/30/21 2115    insulin lispro (HUMALOG) injection vial 8 Units, 8 Units, Subcutaneous, TID WC, Jayleen Avina MD, 8 Units at 05/31/21 1744    prochlorperazine (COMPAZINE) tablet 10 mg, 10 mg, Oral, Q6H PRN, Jayleen Avina MD    cefTRIAXone (ROCEPHIN) 1,000 mg in sodium chloride (PF) 10 mL IV syringe, 1,000 mg, Intravenous, Q24H, Jayleen Avina MD, 1,000 mg at 05/31/21 0771    azithromycin (ZITHROMAX) tablet 500 mg, 500 mg, Oral, Daily, Jayleen Avina MD, 500 mg at 05/31/21 1673    sodium chloride flush 0.9 % injection 5-40 mL, 5-40 mL, Intravenous, 2 times per day, Jayleen Avina MD, 10 mL at 05/31/21 2130    sodium chloride flush 0.9 % injection 5-40 mL, 5-40 mL, Intravenous, PRN, Jayleen Avina MD    0.9 % sodium chloride infusion, 25 mL, Intravenous, PRN, Jayleen Avina MD  [facility] enoxaparin (LOVENOX) injection 40 mg, 40 mg, Subcutaneous, Daily, Karlee Fraga MD, 40 mg at 05/31/21 0955    promethazine (PHENERGAN) tablet 12.5 mg, 12.5 mg, Oral, Q6H PRN **OR** ondansetron (ZOFRAN) injection 4 mg, 4 mg, Intravenous, Q6H PRN, Karlee Fraga MD, 4 mg at 05/30/21 1625    polyethylene glycol (GLYCOLAX) packet 17 g, 17 g, Oral, Daily PRN, Karlee Fraga MD    acetaminophen (TYLENOL) tablet 650 mg, 650 mg, Oral, Q6H PRN, 650 mg at 06/01/21 0430 **OR** acetaminophen (TYLENOL) suppository 650 mg, 650 mg, Rectal, Q6H PRN, Karlee Fraga MD    0.9 % sodium chloride infusion, , Intravenous, Continuous, Karlee Fraga MD, Last Rate: 100 mL/hr at 05/30/21 1640, New Bag at 05/30/21 1640    potassium chloride (KLOR-CON M) extended release tablet 40 mEq, 40 mEq, Oral, PRN **OR** potassium bicarb-citric acid (EFFER-K) effervescent tablet 40 mEq, 40 mEq, Oral, PRN **OR** potassium chloride 10 mEq/100 mL IVPB (Peripheral Line), 10 mEq, Intravenous, PRN, Karlee Fraga MD    magnesium sulfate 2000 mg in 50 mL IVPB premix, 2,000 mg, Intravenous, PRN, aKrlee Fraga MD    insulin lispro (HUMALOG) injection vial 0-12 Units, 0-12 Units, Subcutaneous, TID WC, Karlee Fraga MD, 2 Units at 05/31/21 1300    insulin lispro (HUMALOG) injection vial 0-6 Units, 0-6 Units, Subcutaneous, Nightly, Karlee Fraga MD, 1 Units at 05/30/21 2117    ipratropium-albuterol (DUONEB) nebulizer solution 1 ampule, 1 ampule, Inhalation, Q4H WA, Karlee Fraga MD, 1 ampule at 06/01/21 0700        Assessment/plan  Principal Problem:    CAP (community acquired pneumonia)  Active Problems:    Colon cancer metastasized to lung ([address]    Diabetes ([facility] [address]    Palliative care patient  Resolved Problems:    * No resolved hospital problems.  *    Dyspnea, secondary to postobstructive pneumonia of RML and RLL with extensive lung metastasis, with large mass invading right mainstem bronchus  --Continue current

## 2021-06-01 NOTE — PROGRESS NOTES
Physical Therapy  Name: Shola Hunt  MRN:  [id number]  Date of service:  6/1/2021 06/01/21 1217   Restrictions/Precautions   Restrictions/Precautions Fall Risk   Required Braces or Orthoses? No   General   Chart Reviewed Yes   Subjective   Subjective Pt agreeable to walk with therapy. Pain Screening   Patient Currently in Pain Denies   Oxygen Therapy   SpO2 92 %  (drops as low as 84% with exertion)   O2 Device Nasal cannula   O2 Flow Rate (L/min) 2 L/min   Bed Mobility   Supine to Sit Stand by assistance   Transfers   Sit to Stand Stand by assistance   Stand to sit Stand by assistance   Ambulation   Ambulation? Yes   Ambulation 1   Surface level tile   Device No Device   Other Apparatus O2   Assistance Contact guard assistance   Quality of Gait a little unsteady but no LOB   Distance 125', 75'   Comments Pt's SpO2 dropped to 84% after 125', pt sat in transport chair and instructed in pursed lip breathing which raised SpO2 back up to 92%   Short term goals   Time Frame for Short term goals 2 wks   Short term goal 1 supine to sit indep   Short term goal 2 sit to stand indep   Short term goal 3 amb. 300' with RW SBA   Conditions Requiring Skilled Therapeutic Intervention   Body structures, Functions, Activity limitations Decreased functional mobility ; Decreased safe awareness;Decreased endurance;Decreased sensation;Decreased posture;Decreased balance   Assessment Pt amb well with therapy, cont to be limited by SpO2 dropping during periods of exertion. Pt up in recliner with all needs in reach following tx.    Activity Tolerance   Activity Tolerance Patient Tolerated treatment well;Patient limited by endurance   Safety Devices   Type of devices Call light within reach;Gait belt;Left in chair         Electronically signed by Eb Sandoval PTA on 6/1/2021 at 12:20 PM

## 2021-06-01 NOTE — CONSULTS
Palliative Care Consult Note    6/1/2021 12:41 PM  Subjective:  Admit Date: 5/30/2021  PCP: Edi Lopez MD, MD    Date of Service: 6/1/2021    Reason for Consultation:  Goals of Care, Code Status, Family Support     History Obtained From: EMR/Patient and their Family    History Of Present Illness: The patient is a 64 y.o. male with PMH metastatic colon cancer, DM II, HTN, history of PE who presented to [facility] ED complaining of worsening dyspnea and productive cough with hemoptysis. He states he moved to [place] recently and has been followed by Oncology in [facility] where he receives chemotherapy every 2 weeks. States he was initially diagnosed with colon cancer 4 1/2 years ago. Work up in ED included a CTA chest which revealed extensive metastatic disease involving the lungs with an upper lobe mass with suspected invasion into the mediastinum and right mainstem bronchus as well as tree-in-bud appearance in RML/RLL concerning for post-obstructive pneumonia. It also revealed metastatic disease involving the liver and adrenal glands as well as possible tumor thrombus in the IVC adjacent to the right adrenal gland. He was admitted to Hospitalist service and placed on Rocephin and Azithromycin. Pulmonology and Oncology were consulted. Recommendation made for palliative radiation in order to reduce tumor burden in the right hilar area and control hemoptysis. If radiation not an option then recommendation to pursue stent placement at a tertiary facility. Palliative care was consulted to assist with goals of care and code status discussion.      Past Medical History:        Diagnosis Date    Cancer [facility])     colon    Diabetes mellitus ([facility] [address]     Essential hypertension 5/6/2019    Hyperlipidemia 7/26/2017    Last Assessment & Plan:  Formatting of this note might be different from the original. On Lipitor    Palliative care patient 06/01/2021     Past Surgical History:        Procedure Laterality Date    / headache / paresthesias  Musculoskeletal:  + muscle weakness /joint stiffness / pain  Vascular: Denies edema / claudication / varicosities  Heme / endocrine: Denies easy bruising / bleeding / excessive sweating / heat or cold intolerance  Psychiatric:  Denies depression / anxiety / insomnia / mood changes  Skin:  Denies new rashes / lesions / skin hair or nail changes    14 point review of systems is negative except as specifically addressed above. Physical Examination:  /86   Pulse 90   Temp 96.6 °F (35.9 °C)   Resp 20   Ht 6' 1\" (1.854 m)   Wt 200 lb 14.4 oz (91.1 kg)   SpO2 92% Comment: drops as low as 84% with exertion  BMI 26.51 kg/m²   General appearance: 63 yo male, no acute distress, sitting up comfortably in bed  Head: Normocephalic, without obvious abnormality, atraumatic  Eyes: conjunctivae/corneas clear. PERRL, EOM's intact. Ears: normal external ears and nose, throat without exudate  Neck: no adenopathy, no carotid bruit, no JVD, supple, symmetrical, trachea midline  Lungs: diminished breath sounds, worse to RLL otherwise clear to auscultation bilaterally,no rales or wheezes   Heart: regular rate and rhythm, S1, S2 normal, no murmur  Abdomen:soft, non-tender; non-distended, normal bowel sounds no masses, no organomegaly  Extremities:No lower extremity edema,  No erythema, no tenderness to palpation  Skin: Skin color, texture, turgor normal. No rashes or lesions  Lymphatic: No palpable lymph node enlargment  Neurologic: Alert and oriented X 3, normal strength and tone. Normal symmetric reflexes.  Mental status: Alert, oriented, thought content appropriate  Cranial nerves:II-XII Grossly intact Sensory: normal Motor:grossly normal  Psychiatric: Alert and oriented, thought content appropriate, normal insight, mood appropriate    Diagnostic Data:  CBC:  Recent Labs     05/30/21  1240 05/31/21  0334 06/01/21  0357   WBC 5.6 5.7 5.7   HGB 16.8 15.0 14.7   HCT 50.8 46.7 46.2    152 179 Signed by Dr Francesca Lares on 5/30/2021 3:22 PM    Palliative Performance Score: 60-70%    Palliative Review of Advance Directives:     Surrogate Decision Maker:yes    Durable Power of :no    Advanced Directives/Living Morales: no    Out of hospital medical orders in place to reflect resuscitation status (MOLST/POLST): no    Information Sharing:  Patient's awareness of illness:  [] Terminal [] Life-Threatening [x] Serious [] Non life-threatening [] Not serious   [] Not discussed    Family awareness of illness:   [] Terminal [] Life-Threatening [x] Serious [] Nonlife-threatening [] Not serious   [] Not discussed    Assessment/Plan:  Principal Problem:    CAP (community acquired pneumonia)  Active Problems:    Colon cancer metastasized to lung ([facility] [address]    Diabetes ([facility] [address]    Palliative care patient  Resolved Problems:    * No resolved hospital problems. *     Visit Summary:  Chart reviewed, patient discussed with consulting service and nursing staff. Reviewed health issues, work up and treatment plan as well as factors that lead to hospitalization. Mr. Russ King states he was first diagnosed 4 1/2 years ago with colon cancer during a hospitalization for colitis. He then underwent a right colectomy, partial omentectomy, partial peritonectomy 10/17/2017 and was started on chemotherapy and later found he had progression of disease. Mr. Russ King states he understands severity of his illness. He wants to try palliative radiation/stent placement stating \"I'm not ready to give up yet\". He has a 11year old granddaughter who calls him 3 times a day that he is very close with. He tells me he has very little pain or dyspnea at present time. Does have pain described at 6-7/10 on pain scale with breathing exercises or cough. Pain is described as \"aching/pulling\" to upper chest. States it is relieved when he repositions or breathing/coughing slows. Has been taking tylenol which has helped at times. Currently utilizing O2 via NC at 2L.

## 2021-06-01 NOTE — PROGRESS NOTES
Occupational Therapy   Occupational Therapy Initial Assessment  Date: 2021   Patient Name: Wendy Juan  MRN: [id number]     : 1960    Date of Service: 2021    Discharge Recommendations:  Home with assist PRN       Assessment   Assessment: Pt has SOB with activity. Treatment Diagnosis: Weakness  Decision Making: Medium Complexity  Assistance / Modification: Pt requires minimal assistance when his O2 levels decrease. OT Education: Plan of Care;OT Role;Transfer Training  Patient Education: Pt educated about safety of transfers and to sit when he starts to hurt and gets SOA. REQUIRES OT FOLLOW UP: Yes  Activity Tolerance  Activity Tolerance: Patient limited by fatigue  Activity Tolerance: Pt's O2 decreased during ambulation down the diaz. Pt required a rest break. Safety Devices  Safety Devices in place: Not Applicable (Pt removes all of the alarms and will not wear them.)           Patient Diagnosis(es): The primary encounter diagnosis was Primary colon cancer with metastasis to other site [facility]). Diagnoses of Malignant neoplasm of lung, unspecified laterality, unspecified part of lung ([facility] [address] and Pneumonia due to organism were also pertinent to this visit. has a past medical history of Cancer ([facility] [address] Diabetes mellitus ([facility] [address] Essential hypertension, Hyperlipidemia, and Palliative care patient. has a past surgical history that includes Tunneled venous port placement; Abdomen surgery; Colonoscopy; and Endoscopy, colon, diagnostic.     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 \" \"I had chemo and radiation. \"  General Comment  Comments: Pt reports he hurts after a the breathing treatments. Patient Currently in Pain: Yes  Pain Assessment  Pain Assessment: 0-10  Pain Level: 5  Pain Type: Acute pain  Pain Location: Foot  Pain Radiating Towards: Pt reports he has pain in his feet while walking down the diaz in socks. Pain Descriptors: Aching;Discomfort; Sore  Pain Frequency: Intermittent  Pre Treatment Pain Screening  Pain at present: 0  Intervention List: Patient able to continue with treatment  Comments / Details: Pt had to sit in the diaz when O2 dropped to 84%. Pt requires time for O2 to increase to 90 or above. Vital Signs  Patient Currently in Pain: Yes  Oxygen Therapy  SpO2: (!) 84 %  Pulse Oximeter Device Mode: Intermittent  Pulse Oximeter Device Location: Left;Finger;Right  O2 Device: Nasal cannula  O2 Flow Rate (L/min): 2 L/min  Social/Functional History  Social/Functional History  Lives With: Son  Type of Home: Trailer  Home Layout: One level  Home Access: Stairs to enter with rails  Entrance Stairs - Number of Steps: 3 steps  Bathroom Shower/Tub: Tub/Shower unit  Bathroom Toilet: Standard  Home Equipment:  (none)  Receives Help From: Family  ADL Assistance: Independent  Homemaking Assistance: Independent  Homemaking Responsibilities: Yes  Ambulation Assistance: Independent  Transfer Assistance: Independent  Active : Yes  Mode of Transportation: Car  Occupation: On disability  Type of occupation:   Leisure & Hobbies: takes walks with granddaughter  Additional Comments: pt states he has been on chemo and has had colon cancer for 4 years. Daughter cooks and cleans. Objective        Orientation  Overall Orientation Status: Within Normal Limits  Observation/Palpation  Observation: 2L of O2 and ambulated with wide stance down the diaz.  Pt had to sit down and rest for a few minutes. ADL  Feeding: Independent  Grooming: Independent  UE Bathing: Independent  LE Bathing: Independent  UE Dressing: Independent  LE Dressing: Independent  Toileting: Independent        Bed mobility  Rolling to Right: Independent  Supine to Sit: Independent  Sit to Supine: Independent  Scooting: Independent  Transfers  Stand Step Transfers: Independent  Stand Pivot Transfers: Independent  Sit Pivot Transfers: Independent  Sit to stand: Independent  Stand to sit: Independent     Cognition  Overall Cognitive Status: WNL  Problem Solving: Able to problem solve independently       LUE AROM (degrees)  LUE AROM : WNL  RUE AROM (degrees)  RUE AROM : WNL  LUE Strength  L Hand General: 4+/5  LUE Strength Comment: 4+/5  RUE Strength  R Hand General: 4+/5  RUE Strength Comment: 4+/5       Plan   Plan  Times per week: 2-5xwk  Plan weeks: 2  Current Treatment Recommendations: Strengthening, Endurance Training, Self-Care / ADL, Balance Training  Plan Comment: OT to treat 2-5X wk for self cares and ADLS. Goals  Short term goals  Short term goal 1: Pt to demonstrate safety techniques and independence of showering and O2 to stay above 85%. Short term goal 2: Pt to demonstrate independence of self cares. Short term goal 3: Pt to increase activity tolerance to 10 minutes of dyn. standing. Patient Goals   Patient goals : To return home.        Therapy Time   Individual Concurrent Group Co-treatment   Time In           Time Out           Minutes                   Ginny Melara OT Electronically signed by ÁNGEL Cespedes MOT OTR/L on 6/1/2021 at 1:44 PM

## 2021-06-01 NOTE — CARE COORDINATION
Spoke with patient regarding MD orders for [facility] services. Patient agreeable and has chosen [facility]. Referral Faxed. [address] [place] [address] [phone]. [phone]. Please notify [address] [place] [address] when patient discharges and fax DC Summary,  DC med list and any new [facility] orders. The Patient was provided with a choice of provider and agrees   with the discharge plan. [x] Yes [] No    Freedom of choice list was provided with basic dialogue that supports the patient's individualized plan of care/goals, treatment preferences and shares the quality data associated with the providers.  [x] Yes [] No  Electronically signed by Robert Swanson on 6/1/2021 at 12:51 PM

## 2021-06-01 NOTE — ACP (ADVANCE CARE PLANNING)
does not typically restart the heart for people who have serious health conditions or who are very sick. \"    \"In the event your heart stopped as a result of an underlying serious health condition, would you want attempts to be made to restart your heart (answer \"yes\" for attempt to resuscitate) or would you prefer a natural death (answer \"no\" for do not attempt to resuscitate)? \"   YES     NOTE: If the patient has a valid advance directive AND provides care preference(s) that are inconsistent with that prior directive, advise the patient to consider either: creating a new advance directive that complies with state-specific requirements; or, if that is not possible, orally revoking that prior directive in accordance with state-specific requirements, which must be documented in the EHR.     Conversation Outcomes / Follow-Up Plan:   Complete advanced directive       Length of Voluntary ACP Conversation in minutes:  20 minutes    Eliecer Tipton PA-C

## 2021-06-02 LAB
ANION GAP SERPL CALCULATED.3IONS-SCNC: 12 MMOL/L (ref 7–19)
BASOPHILS ABSOLUTE: 0 K/UL (ref 0–0.2)
BASOPHILS RELATIVE PERCENT: 0.8 % (ref 0–1)
BUN BLDV-MCNC: 10 MG/DL (ref 6–20)
CALCIUM SERPL-MCNC: 8.6 MG/DL (ref 8.6–10)
CHLORIDE BLD-SCNC: 101 MMOL/L (ref 98–111)
CO2: 28 MMOL/L (ref 22–29)
CREAT SERPL-MCNC: 1 MG/DL (ref 0.5–1.2)
EOSINOPHILS ABSOLUTE: 0.1 K/UL (ref 0–0.6)
EOSINOPHILS RELATIVE PERCENT: 3.1 % (ref 0–5)
GFR AFRICAN AMERICAN: >59
GFR NON-AFRICAN AMERICAN: >60
GLUCOSE BLD-MCNC: 134 MG/DL (ref 70–99)
GLUCOSE BLD-MCNC: 136 MG/DL (ref 70–99)
GLUCOSE BLD-MCNC: 136 MG/DL (ref 74–109)
GLUCOSE BLD-MCNC: 145 MG/DL (ref 70–99)
GLUCOSE BLD-MCNC: 148 MG/DL (ref 70–99)
GLUCOSE BLD-MCNC: 162 MG/DL (ref 70–99)
HCT VFR BLD CALC: 47 % (ref 42–52)
HEMOGLOBIN: 15 G/DL (ref 14–18)
IMMATURE GRANULOCYTES #: 0 K/UL
LYMPHOCYTES ABSOLUTE: 0.4 K/UL (ref 1.1–4.5)
LYMPHOCYTES RELATIVE PERCENT: 9.8 % (ref 20–40)
MCH RBC QN AUTO: 29.1 PG (ref 27–31)
MCHC RBC AUTO-ENTMCNC: 31.9 G/DL (ref 33–37)
MCV RBC AUTO: 91.3 FL (ref 80–94)
MONOCYTES ABSOLUTE: 0.6 K/UL (ref 0–0.9)
MONOCYTES RELATIVE PERCENT: 14.4 % (ref 0–10)
NEUTROPHILS ABSOLUTE: 2.8 K/UL (ref 1.5–7.5)
NEUTROPHILS RELATIVE PERCENT: 71.1 % (ref 50–65)
PDW BLD-RTO: 17.2 % (ref 11.5–14.5)
PERFORMED ON: ABNORMAL
PLATELET # BLD: 180 K/UL (ref 130–400)
PMV BLD AUTO: 9.5 FL (ref 9.4–12.4)
POTASSIUM REFLEX MAGNESIUM: 3.6 MMOL/L (ref 3.5–5)
RBC # BLD: 5.15 M/UL (ref 4.7–6.1)
SODIUM BLD-SCNC: 141 MMOL/L (ref 136–145)
WBC # BLD: 3.9 K/UL (ref 4.8–10.8)

## 2021-06-02 PROCEDURE — 6370000000 HC RX 637 (ALT 250 FOR IP): Performed by: STUDENT IN AN ORGANIZED HEALTH CARE EDUCATION/TRAINING PROGRAM

## 2021-06-02 PROCEDURE — 99233 SBSQ HOSP IP/OBS HIGH 50: CPT | Performed by: PHYSICIAN ASSISTANT

## 2021-06-02 PROCEDURE — 6360000002 HC RX W HCPCS: Performed by: STUDENT IN AN ORGANIZED HEALTH CARE EDUCATION/TRAINING PROGRAM

## 2021-06-02 PROCEDURE — 2700000000 HC OXYGEN THERAPY PER DAY

## 2021-06-02 PROCEDURE — 99232 SBSQ HOSP IP/OBS MODERATE 35: CPT | Performed by: INTERNAL MEDICINE

## 2021-06-02 PROCEDURE — 2580000003 HC RX 258: Performed by: STUDENT IN AN ORGANIZED HEALTH CARE EDUCATION/TRAINING PROGRAM

## 2021-06-02 PROCEDURE — 80048 BASIC METABOLIC PNL TOTAL CA: CPT

## 2021-06-02 PROCEDURE — 94760 N-INVAS EAR/PLS OXIMETRY 1: CPT

## 2021-06-02 PROCEDURE — 6370000000 HC RX 637 (ALT 250 FOR IP): Performed by: PHYSICIAN ASSISTANT

## 2021-06-02 PROCEDURE — 94761 N-INVAS EAR/PLS OXIMETRY MLT: CPT

## 2021-06-02 PROCEDURE — 6370000000 HC RX 637 (ALT 250 FOR IP): Performed by: HOSPITALIST

## 2021-06-02 PROCEDURE — 82947 ASSAY GLUCOSE BLOOD QUANT: CPT

## 2021-06-02 PROCEDURE — 97116 GAIT TRAINING THERAPY: CPT

## 2021-06-02 PROCEDURE — 36415 COLL VENOUS BLD VENIPUNCTURE: CPT

## 2021-06-02 PROCEDURE — 97530 THERAPEUTIC ACTIVITIES: CPT

## 2021-06-02 PROCEDURE — 85025 COMPLETE CBC W/AUTO DIFF WBC: CPT

## 2021-06-02 PROCEDURE — 94640 AIRWAY INHALATION TREATMENT: CPT

## 2021-06-02 PROCEDURE — 1210000000 HC MED SURG R&B

## 2021-06-02 RX ORDER — GUAIFENESIN 600 MG/1
1200 TABLET, EXTENDED RELEASE ORAL 2 TIMES DAILY
Status: DISCONTINUED | OUTPATIENT
Start: 2021-06-02 | End: 2021-06-03 | Stop reason: HOSPADM

## 2021-06-02 RX ADMIN — SODIUM CHLORIDE, PRESERVATIVE FREE 10 ML: 5 INJECTION INTRAVENOUS at 20:49

## 2021-06-02 RX ADMIN — ACETAMINOPHEN 650 MG: 325 TABLET ORAL at 14:38

## 2021-06-02 RX ADMIN — IPRATROPIUM BROMIDE AND ALBUTEROL SULFATE 1 AMPULE: .5; 3 SOLUTION RESPIRATORY (INHALATION) at 05:10

## 2021-06-02 RX ADMIN — INSULIN LISPRO 2 UNITS: 100 INJECTION, SOLUTION INTRAVENOUS; SUBCUTANEOUS at 12:43

## 2021-06-02 RX ADMIN — HYDROCODONE BITARTRATE AND ACETAMINOPHEN 1 TABLET: 5; 325 TABLET ORAL at 05:18

## 2021-06-02 RX ADMIN — IPRATROPIUM BROMIDE AND ALBUTEROL SULFATE 1 AMPULE: .5; 3 SOLUTION RESPIRATORY (INHALATION) at 18:32

## 2021-06-02 RX ADMIN — GUAIFENESIN 1200 MG: 600 TABLET, EXTENDED RELEASE ORAL at 20:49

## 2021-06-02 RX ADMIN — AZITHROMYCIN 500 MG: 250 TABLET, FILM COATED ORAL at 08:45

## 2021-06-02 RX ADMIN — ENOXAPARIN SODIUM 40 MG: 40 INJECTION SUBCUTANEOUS at 08:44

## 2021-06-02 RX ADMIN — SODIUM CHLORIDE, PRESERVATIVE FREE 10 ML: 5 INJECTION INTRAVENOUS at 09:12

## 2021-06-02 RX ADMIN — SODIUM CHLORIDE, PRESERVATIVE FREE 1000 MG: 5 INJECTION INTRAVENOUS at 08:44

## 2021-06-02 RX ADMIN — IPRATROPIUM BROMIDE AND ALBUTEROL SULFATE 1 AMPULE: .5; 3 SOLUTION RESPIRATORY (INHALATION) at 10:48

## 2021-06-02 RX ADMIN — ACETAMINOPHEN 650 MG: 325 TABLET ORAL at 20:53

## 2021-06-02 RX ADMIN — INSULIN LISPRO 2 UNITS: 100 INJECTION, SOLUTION INTRAVENOUS; SUBCUTANEOUS at 17:35

## 2021-06-02 RX ADMIN — IPRATROPIUM BROMIDE AND ALBUTEROL SULFATE 1 AMPULE: .5; 3 SOLUTION RESPIRATORY (INHALATION) at 14:13

## 2021-06-02 ASSESSMENT — ENCOUNTER SYMPTOMS
EYE DISCHARGE: 0
GASTROINTESTINAL NEGATIVE: 1
COUGH: 1
NAUSEA: 0
EYES NEGATIVE: 1
BACK PAIN: 0
VOMITING: 0
EYE PAIN: 0
BLOOD IN STOOL: 0
SORE THROAT: 0
DIARRHEA: 0
ABDOMINAL PAIN: 0
SHORTNESS OF BREATH: 1
CONSTIPATION: 0
WHEEZING: 0
EYE REDNESS: 0

## 2021-06-02 ASSESSMENT — PAIN DESCRIPTION - DESCRIPTORS: DESCRIPTORS: SORE;DISCOMFORT

## 2021-06-02 ASSESSMENT — PAIN SCALES - GENERAL
PAINLEVEL_OUTOF10: 4
PAINLEVEL_OUTOF10: 6
PAINLEVEL_OUTOF10: 6
PAINLEVEL_OUTOF10: 0

## 2021-06-02 ASSESSMENT — PAIN - FUNCTIONAL ASSESSMENT: PAIN_FUNCTIONAL_ASSESSMENT: PREVENTS OR INTERFERES SOME ACTIVE ACTIVITIES AND ADLS

## 2021-06-02 ASSESSMENT — PAIN DESCRIPTION - PROGRESSION: CLINICAL_PROGRESSION: NOT CHANGED

## 2021-06-02 ASSESSMENT — PAIN DESCRIPTION - LOCATION: LOCATION: RIB CAGE

## 2021-06-02 ASSESSMENT — PAIN DESCRIPTION - ORIENTATION: ORIENTATION: UPPER

## 2021-06-02 ASSESSMENT — PAIN DESCRIPTION - FREQUENCY: FREQUENCY: INTERMITTENT

## 2021-06-02 ASSESSMENT — PAIN DESCRIPTION - PAIN TYPE: TYPE: ACUTE PAIN

## 2021-06-02 ASSESSMENT — PAIN DESCRIPTION - ONSET: ONSET: ON-GOING

## 2021-06-02 NOTE — PROGRESS NOTES
Palliative Care Progress Note  6/2/2021 2:28 PM    Patient:  Pablo Cardona  YOB: 1960  Primary Care Physician: Erik Zuniga MD, MD  Advance Directive: Full Code  Admit Date: 5/30/2021       Hospital Day: 3  Portions of this note have been copied forward, however, changed to reflect the most current clinical status of this patient. CHIEF COMPLAINT/REASON FOR CONSULTATION Goals of care, Code status, family support    SUBJECTIVE:  Mr. Brandon Sykes has no new complaints. States he was dyspneic earlier today. Continues to have productive cough with scant hemoptysis. Denies pain at this time. Review of Systems:   14 point review of systems is negative except as specifically addressed above. Objective:   VITALS:  /81   Pulse 91   Temp 96.6 °F (35.9 °C) (Temporal)   Resp 18   Ht 6' 1\" (1.854 m)   Wt 207 lb 14.4 oz (94.3 kg)   SpO2 91%   BMI 27.43 kg/m²   24HR INTAKE/OUTPUT:      Intake/Output Summary (Last 24 hours) at 6/2/2021 1428  Last data filed at 6/2/2021 0716  Gross per 24 hour   Intake 520 ml   Output --   Net 520 ml     General appearance: 65 yo male, no acute distress, resting comfortably in bed   Head: Normocephalic, without obvious abnormality, atraumatic  Eyes: conjunctivae/corneas clear. PERRL, EOM's intact. Ears: normal external ears and nose, throat without exudate  Neck: no adenopathy, no carotid bruit, no JVD, supple, symmetrical, trachea midline   Lungs: diminished breath sounds throughout, worse to RLL with soft rhonchi, no wheezing or rales   Heart: regular rate and rhythm, S1, S2 normal, no murmur  Abdomen:soft, non-tender; non-distended, bowel sounds present    Extremities: No lower extremity edema,  No erythema, no tenderness to palpation  Skin: Skin color, texture, turgor normal. No rashes or lesions  Lymphatic: No palpable lymph node enlargment  Neurologic: Alert and oriented X 3, generalized weakness and normal tone.  No focal deficits  Psychiatric: Alert and oriented, thought content appropriate, normal insight, mood appropriate    Medications:      sodium chloride      sodium chloride 100 mL/hr at 05/30/21 1640      insulin glargine  32 Units Subcutaneous Nightly    insulin lispro  8 Units Subcutaneous TID     cefTRIAXone (ROCEPHIN) IV  1,000 mg Intravenous Q24H    azithromycin  500 mg Oral Daily    sodium chloride flush  5-40 mL Intravenous 2 times per day    enoxaparin  40 mg Subcutaneous Daily    insulin lispro  0-12 Units Subcutaneous TID     insulin lispro  0-6 Units Subcutaneous Nightly    ipratropium-albuterol  1 ampule Inhalation Q4H WA     HYDROcodone 5 mg - acetaminophen, prochlorperazine, sodium chloride flush, sodium chloride, promethazine **OR** ondansetron, polyethylene glycol, acetaminophen **OR** acetaminophen, potassium chloride **OR** potassium alternative oral replacement **OR** potassium chloride, magnesium sulfate  DIET GENERAL; Carb Control: 3 carb choices (45 gms)/meal; No Added Salt (3-4 GM)     Lab and other Data:     Recent Labs     05/31/21 0334 06/01/21 0357 06/02/21 0357   WBC 5.7 5.7 3.9*   HGB 15.0 14.7 15.0    179 180     Recent Labs     05/31/21 0334 06/01/21 0357 06/02/21 0357    141 141   K 3.7 3.5 3.6    101 101   CO2 30* 32* 28   BUN 8 8 10   CREATININE 1.0 1.1 1.0   GLUCOSE 122* 141* 136*     No results for input(s): AST, ALT, ALB, BILITOT, ALKPHOS in the last 72 hours. Troponin T:   No results for input(s): TROPONINI in the last 72 hours. INR:   Recent Labs     05/31/21 0334   INR 1.17     ABG:  No results for input(s): PHART, KCQ0SZW, PO2ART, QOS3ARM, BEART, HGBAE, X1KTVXCN, CARBOXHGBART in the last 72 hours.   Assessment/Plan   Principal Problem:    CAP (community acquired pneumonia)  Active Problems:    Primary colon cancer with metastasis to other site ([facility] [address]    Diabetes ([facility] [address]    Palliative care patient    Pain aggravated by activities of daily living  Resolved Problems:    * No resolved hospital problems. *    Visit Summary:  Chart reviewed, patient discussed with consulting service and nursing staff. Reviewed health issues, work up and treatment plan as well as factors that lead to hospitalization. Mr. Leanna Mackay states he was dyspneic this morning but it has now resolved. Denies pain. Hopeful to discharge home soon. States breathing treatments help. Does not have a nebulizer at home and feels this would be beneficial at discharge. He would like outpatient supportive care at discharge. Recommendations:   1. Palliative Care-Code status FULL CODE Goal of care Return home and continue with Palliative radiation/chemotherapy. Will place referral for outpatient supportive care. Will continue discussion regarding goals of care throughout this hospitalization    2. Pain with dyspnea-overall improved, feels breathing treatments are the most beneficial. Does not have nebulizer. Communicated this to his attending and feel he would benefit from that DME on discharge    3. Colon cancer with metastasis to liver, lung, retroperitoneal nodes and adrenals-Oncology/Pulmonology following. Patient feels treatment continues to have more benefit than risk at this time     Thank you for consulting Palliative Care and allowing us to participate in the care of this patient.    Time Spent Counseling > 50%:  YES                                   Total Time Spent with patient/family counseling, workup/treatment review, counseling and placement of orders/preparation of this note: 36 minutes    Electronically signed by Elier Ramey PA-C on 6/2/2021 at 2:28 PM    (Please note that portions of this note were completed with a voice recognition program.  Og Pelaez made to edit the dictations but occasionally words are mis-transcribed.)

## 2021-06-02 NOTE — PROGRESS NOTES
06/02/21 1131   Restrictions/Precautions   Restrictions/Precautions Fall Risk   Required Braces or Orthoses? No   General   Chart Reviewed Yes   Subjective   Subjective Patient agrees to walk   Pain Screening   Patient Currently in Pain Denies   Intervention List Patient able to continue with treatment   Vital Signs   Level of Consciousness Alert (0)   Oxygen Therapy   O2 Device Nasal cannula   O2 Flow Rate (L/min) 3 L/min   Transfers   Sit to Stand Stand by assistance   Stand to sit Stand by assistance   Ambulation   Ambulation?  Yes   Ambulation 1   Device No Device   Other Apparatus O2   Assistance Stand by assistance   Quality of Gait Slightly unsteady no LOB   Gait Deviations Increased OLGA LIDIA   Distance 150'   Comments Patient in chair post gait   Activity Tolerance   Activity Tolerance Patient Tolerated treatment well   Safety Devices   Type of devices Left in chair;Call light within reach   Physical Therapy    Electronically signed by Betty Merlos PTA on 6/2/2021 at 11:37 AM

## 2021-06-02 NOTE — PROGRESS NOTES
Occupational Therapy     06/02/21 1318   Pre Treatment Pain Screening   Pain at present 0   Intervention List Patient able to continue with treatment   Restrictions/Precautions   Restrictions/Precautions Fall Risk   Required Braces or Orthoses? No   General   Chart Reviewed Yes   Patient assessed for rehabilitation services? Yes   Additional Pertinent Hx Colon CA, Metastatic to malignant neoplasm of lung, PNA, needs O2, PE, Liver mets 2018   Response to previous treatment Patient with no complaints from previous session   Family / Caregiver Present No   Referring Practitioner Maura Chopra MD   Diagnosis Post obstructive pneumonia due to foreign body aspiration   Subjective   Subjective 2 attempts to treat patient this morning. Pt states he hasn't had much sleep and is tired. Pt denies needing a shower or needing to use this bathroom this morning. General Comment   Comments Pt agreeable to go for a walk on second attempt. Pain Assessment   Patient Currently in Pain Denies   ADL   Feeding Independent   Grooming Independent   UE Bathing Independent   LE Bathing Independent   UE Dressing Independent   LE Dressing Independent   Toileting Independent   Balance   Sitting Balance Independent   Standing Balance Stand by assistance   Functional Mobility   Functional - Mobility Device Rolling Walker   Activity Other  (down hallway and back.)   Assist Level Stand by assistance   Transfers   Stand Step Transfers Independent   Assessment   Assessment Pt has SOB with activity, pt requires assistance with supplemental O2 when walking. Treatment Diagnosis Weakness   Assistance / Modification Pt requires minimal/ CGA assistance when his O2 levels decrease. REQUIRES OT FOLLOW UP Yes   Treatment Initiated  Treatment focused on functional mobility in hallway and encouragement to sit up during lunch.     Discharge Recommendations Home with assist PRN   Activity Tolerance   Activity Tolerance Patient limited by fatigue Activity Tolerance Pt's O2 decreased during ambulation down the diaz. Pt required a rest break. Safety Devices   Safety Devices in place N/A   Plan   Times per week 2-5xwk   Times per day Daily   Plan weeks 2   Current Treatment Recommendations Strengthening; Endurance Training;Self-Care / ADL; Balance Training   Plan Comment OT to treat 2-5X wk for self cares and ADLS.    Electronically signed by SHANIA Umanzor on 6/2/2021 at 1:26 PM

## 2021-06-02 NOTE — PROGRESS NOTES
Patient name: Shola Hunt  Patient : 1960  6:36 AM  ROOM 511  Patient Active Problem List   Diagnosis Code    CAP (community acquired pneumonia) J18.9    Primary colon cancer with metastasis to other site ([facility] [address] C18.9    Diabetes ([facility] [address] E11.9    Palliative care patient Z51.5    Essential hypertension I10    Hyperlipidemia E78.5    Pain aggravated by activities of daily living R52     Portions of this note have been copied forward, however, changed to reflect the most current clinical status of this patient. Subjective: Feeling somewhat better. Reports having an episode of anxiety/shortness of air this morning, resolved and currently in no acute distress. HISTORY OF PRESENT ILLNESS:   Diagnosis  · Metastatic colonic adenocarcinoma, 2017  · pT3N2a, stage IIIB  · Pulmonary embolism, May 2019  · K-michael mutated  · MSI stable  · PIK3CA mutated  · Liver metastasis, 2018     Treatment summary  · Modified FOLFOX 6  · 5-FU/leucovorin  · SBRT right upper lobe lung mass  · FOLFIRI        Target lesions  · Liver metastasis  · Right upper lobe/left lower lobe lung metastasis  · Adrenal metastasis bilaterally     Bere Ledesma was first seen by Dr. Richa Callejas on 2021. Patient is a pleasant 64years old male who is a patient of Dr. Germaine Díaz oncologist at [facility] [place] in [facility]. Patient was initially diagnosed in 2017 when he was hospitalized for colitis. CT abdomen showed thickening of the lower right colon. A colonoscopy was performed in 2017 that showed a large fungating mass in the cecum and proximal ascending colon. Biopsy was consistent with invasive carcinoma with mucinous features. Staging CT scans showed a left lower lobe micronodule measuring 3 mm and a 1.6 cm left adrenal nodule in 2017.   The patient underwent a laparoscopic open right colectomy, partial omentectomy and partial peritonectomy on 10/17/2017. Pathology consistent with differentiated mucinous adenocarcinoma involving the cecal pouch, ileocecal valve and extending intraluminally into the terminal ileus. For tumor deposits present. Final pathologic staging consistent with K-michael mutated, for positive lymph nodes [id number], stage IIIB colon cancer. He received adjuvant modified FOLFOX 6 started November 2017. Unfortunately biopsy-proven metastatic disease to the liver documented in January 2018. Avastin was added to his regimen. He completed 10 cycles of oxaliplatin which was discontinued. Avastin was added on for cycle #5. He then completed 32 cycles of 5-FU/Avastin. He developed pulmonary embolism and was started on Lovenox and later on switched to Eliquis in August 2019. In July 2020 he had disease progression and was started on FOLFIRI. He received hyperfractionated RT to left lower lobe nodule July 2019. He was last seen by his oncologist on 05/12/2021. Patient was considering moving care to [facility] where he lives with his daughter. Apparently his last chemotherapy was in November 2020 and the patient was lost to follow-up. He was seen again in February 2021 and received a cycle of FOLFIRI. Poor compliance to his medical visit with his oncologist due to several logistic problems. 3/5/2021-CT chest performed at the [facility] showed the previously seen right upper lobe mass has significantly enlarged currently measuring 5.0 x 5.5 x 6.9 cm and extending into and at some levels is indistinct from the right bronchovascular structures with also some narrowing of the pulmonary arteries of this region. Jerilynn Norberto are multiple small satellite nodular opacities surrounding this mass.  The previously seen left lower lobe mass has significantly enlarged currently measuring 5.0 x 4.7 x 4.7 cm. Jerilynn Norberto is a newly developed small left lower lobe nodule measuring 7.5 mm.   3/5/2021-CT abdomen pelvis showed bilateral adrenal nodules with a left adrenal nodule measuring 3.9 x 1.6 cm. The right adrenal nodule measured 1.8 cm. Subcentimeter low-density liver lesions and additional subcentimeter hyperenhancing focus in the liver, change. Possible wall thickening of the distal colon and rectum. Status post right hemicolectomy. 5/12/2021-he was last seen by his oncologist.  Patient plans to move care to [place].  5/14/2021-last infusion of palliative FOLFIRI  5/30/2021-the patient presented to the ED department at [facility] with complaints of increased short of breath over the last past month. He also had productive cough with greenish sputum. Denied any fever or chills. 6/1/2021-CEA 33.7             Review of Systems   Constitutional: Positive for fatigue. Negative for chills, diaphoresis and fever. HENT: Negative. Negative for congestion, ear pain, hearing loss, nosebleeds, sore throat and tinnitus. Eyes: Negative. Negative for pain, discharge and redness. Respiratory: Positive for cough and shortness of breath. Negative for wheezing. Cardiovascular: Negative. Negative for chest pain, palpitations and leg swelling. Gastrointestinal: Negative. Negative for abdominal pain, blood in stool, constipation, diarrhea, nausea and vomiting. Endocrine: Negative for polydipsia. Genitourinary: Negative for dysuria, flank pain, frequency, hematuria and urgency. Musculoskeletal: Negative. Negative for back pain, myalgias and neck pain. Skin: Negative. Negative for rash. Neurological: Positive for weakness. Negative for dizziness, tremors, seizures and headaches. Hematological: Does not bruise/bleed easily. Psychiatric/Behavioral: Negative. The patient is not nervous/anxious.         Current Pain Assessment  Pain Assessment  Pain Assessment: 0-10  Pain Level: 6  Patient's Stated Pain Goal: No pain  Pain Type: Acute pain  Pain Location: Rib cage  Pain Orientation: Upper  Pain Radiating Towards: Pt reports he has pain in his feet while walking down the diaz in socks. Pain Descriptors: Sore, Discomfort  Pain Frequency: Intermittent  Pain Onset: On-going  Clinical Progression: Not changed  Functional Pain Assessment: Prevents or interferes some active activities and ADLs  Non-Pharmaceutical Pain Intervention(s): Repositioned, Rest  Response to Pain Intervention: Asleep with RR greater than 10    OBJECTIVE:  VITALS: BP (!) 135/94   Pulse 97   Temp 96.8 °F (36 °C) (Temporal)   Resp 16   Ht 6' 1\" (1.854 m)   Wt 207 lb 14.4 oz (94.3 kg)   SpO2 91%   BMI 27.43 kg/m²   I&O:     Intake/Output Summary (Last 24 hours) at 6/2/2021 0636  Last data filed at 6/1/2021 1837  Gross per 24 hour   Intake 620 ml   Output --   Net 620 ml         Physical Exam  Vitals reviewed. Constitutional:       General: He is not in acute distress. Appearance: He is well-developed. He is not diaphoretic. HENT:      Head: Normocephalic and atraumatic. Mouth/Throat:      Pharynx: Uvula midline. Tonsils: No tonsillar exudate. Eyes:      General: Lids are normal. No scleral icterus. Right eye: No discharge. Left eye: No discharge. Conjunctiva/sclera: Conjunctivae normal.      Pupils: Pupils are equal, round, and reactive to light. Neck:      Thyroid: No thyroid mass or thyromegaly. Vascular: No JVD. Trachea: Trachea normal. No tracheal deviation. Cardiovascular:      Rate and Rhythm: Normal rate and regular rhythm. Heart sounds: Normal heart sounds. No murmur heard. No friction rub. No gallop. Pulmonary:      Effort: Pulmonary effort is normal. No respiratory distress. Breath sounds: Decreased breath sounds present. No wheezing or rales. Chest:      Chest wall: No tenderness. Abdominal:      General: Bowel sounds are normal. There is no distension. Palpations: Abdomen is soft. There is no mass. Tenderness: There is no abdominal tenderness.  There is no guarding or rebound. Hernia: No hernia is present. Musculoskeletal:         General: No tenderness or deformity. Cervical back: Normal range of motion and neck supple. Comments: Range of motion within normal limits x4 extremities   Skin:     General: Skin is warm. Coloration: Skin is not pale. Findings: No erythema or rash. Neurological:      Mental Status: He is alert and oriented to person, place, and time. Cranial Nerves: No cranial nerve deficit. Coordination: Coordination normal.   Psychiatric:         Behavior: Behavior normal.         Thought Content: Thought content normal.         BMP:   Recent Labs     06/01/21 0357 06/02/21 0357    141   K 3.5 3.6    101   CO2 32* 28   BUN 8 10   CREATININE 1.1 1.0   GLUCOSE 141* 136*   CALCIUM 8.4* 8.6     Recent Labs     06/02/21 0357 06/01/21 0357 05/31/21  0334   WBC 3.9* 5.7 5.7   HGB 15.0 14.7 15.0   HCT 47.0 46.2 46.7   MCV 91.3 90.4 90.2    179 152     CMP:    Recent Labs     05/30/21  1240 06/01/21 0357 06/02/21 0357    141 141   K 3.7 3.5 3.6    101 101   CO2 24 32* 28   BUN 8 8 10   CREATININE 0.9 1.1 1.0   GFRAA >59 >59 >59   LABGLOM >60 >60 >60   GLUCOSE 114* 141* 136*   PROT 6.5*  --   --    LABALBU 3.7  --   --    CALCIUM 8.9 8.4* 8.6   BILITOT 1.0  --   --    ALKPHOS 114  --   --    AST 10  --   --    ALT 8  --   --        30Day lookback of cultures:    Blood Culture Recent:   Recent Labs     05/30/21  1240   BC No Growth to date. Any change in status will be called. Gram Stain Recent: No results for input(s): LABGRAM in the last 720 hours. Resp Culture Recent: No results for input(s): CULTRESP in the last 720 hours. Body Fluid Recent : No results for input(s): BFCX in the last 720 hours. MRSA Recent : No results for input(s): [facility] in the last 720 hours. Urine Culture Recent : No results for input(s): LABURIN in the last 720 hours.   Organism Recent : No results for input(s): ORG in the last 720 hours. Radiology:   XR CHEST PORTABLE    Result Date: 5/30/2021  Exam: XR CHEST PORTABLE - 5/30/2021 11:55 AM Indication: Shortness of breath, history of colon cancer Comparison: None available. Findings: Cardiac silhouette is normal in size. RIGHT chest wall port catheter tip overlying the SVC. 6.5 cm LEFT lower lobe lung mass. 5.3 cm RIGHT suprahilar lung mass. No consolidative airspace process. No pleural effusion or pneumothorax. No acute osseous finding. 1.  No evidence of consolidation to suggest lobar pneumonia. 2.  RIGHT upper lobe and LEFT lower lobe lung masses, likely representing metastasis related to history of colon cancer. Correlate with clinical history and outside imaging (patient reportedly under treatment for colon cancer in [place] [id number]). Signed by Dr Brandi Mason on 5/30/2021 1:27 PM    CTA PULMONARY W CONTRAST    Result Date: 5/30/2021  Exam: CTA PULMONARY W CONTRAST - 5/30/2021 1:43 PM Indication: Cough, hypoxia, history of cancer Comparison: None available. DLP: 1168 mGy cm. In order to have a CT radiation dose as low as reasonably achievable, Automated Exposure Control was utilized for adjustment of the mA and/or KV according to patient size. Findings: Evaluation for PE slightly limited by contrast bolus timing. No evidence of pulmonary embolus. RIGHT chest port with catheter tip in the SVC. Thoracic aorta is nonaneurysmal. No pericardial effusion. 6.8 x 4.8 cm RIGHT hilar/upper lobe mass with extensive airway and vascular encasement. There is direct extension into the mediastinum with partial encasement and apparent direct invasion of the RIGHT mainstem bronchus and bronchus intermedius, intraluminal soft tissue and focal severe narrowing. Tree-in-bud nodularity throughout the RIGHT lower and RIGHT middle lobe is present, differential for which includes endobronchial splaying of neoplasm and postobstructive pneumonia.  The RIGHT lung does remain aerated. 5.0 x 5.5 cm subpleural LEFT lower lobe mass. 11 mm LEFT lower lobe pulmonary nodule. No pleural effusion or pneumothorax. Enlarged mediastinal and bilateral hilar lymph nodes. No acute chest wall soft tissue abnormality. 11 mm hypodense caudate liver lesion. Bilateral adrenal gland masses. On the RIGHT, there is concern for tumor thrombus within the IVC. No aggressive osseous lesion identified. 1.  No evidence of pulmonary embolus. 2.  Extensive metastatic disease in the chest and upper abdomen. 6.8 cm RIGHT hilar/upper lobe mass with extensive vascular and airway encasement and direct mediastinal invasion with encasement and direct invasion of the RIGHT mainstem bronchus and bronchus intermedius. RIGHT lower lobe and middle lobe tree-in-bud nodularity is present with differential including endobronchial spread of neoplasm and postobstructive pneumonia. There is also a 5.5 cm mass in the subpleural LEFT lower lobe as well as an 11 mm LEFT lower lobe pulmonary nodule. Mediastinal and bilateral hilar lymphadenopathy is present. In the upper abdomen, at least one liver metastasis and bilateral adrenal gland masses are present. Suspected tumor thrombus in the IVC adjacent to the RIGHT adrenal gland.  Signed by Dr Austin Madison on 5/30/2021 3:22 PM      Medications  Current Facility-Administered Medications   Medication Dose Route Frequency Provider Last Rate Last Admin    HYDROcodone-acetaminophen (NORCO) 5-325 MG per tablet 1 tablet  1 tablet Oral Q4H PRN Dominique Gagnon PA-C   1 tablet at 06/02/21 0518    insulin glargine (LANTUS) injection vial 32 Units  32 Units Subcutaneous Nightly Adams Alexander MD   32 Units at 06/01/21 2053    insulin lispro (HUMALOG) injection vial 8 Units  8 Units Subcutaneous TID WC Adams Alexander MD   8 Units at 06/01/21 1229    prochlorperazine (COMPAZINE) tablet 10 mg  10 mg Oral Q6H PRN Adams Alexander MD        cefTRIAXone (ROCEPHIN) 1,000 mg in sodium also reviewed the chief complaint (CC) and part of the history (History of Present Illness (HPI), Past Family Social History [facility]), or Review of Systems (ROS) and made changes when appropriated. (Please note that portions of this note were completed with a voice recognition program. Efforts were made to edit the dictations but occasionally words are mis-transcribed.)        Master Yanez, ZHEN    06/02/21  6:36 AM  Physician's attestation/substantial contribution:  I, Dr Shana Marinelli, independently performed an evaluation on [facility]. I have reviewed relevant medical information/data to include but not limited to medication list, relevant appropriate labs and imaging when applicable. I reviewed other physician's notes, ancillary services and nurses assessment. I have reviewed the above documentation completed by the Nurse Practitioner or Physician Assistant. Please see my additional contributions to the history of present illness, physical examination, and assessment/medical decision-making that reflect my findings and impressions. I discussed essential elements of the care plan with the NP or PA and the patient as well. I answered all the questions to the patient's satisfaction. I concur with above stated. Subjective-felt short of breath this morning, but improved. Objective-no change  Assessment/plan:  Metastatic colonic adenocarcinoma-will make arrangements for outpatient continuation of palliative chemotherapy. Will also make arrangement for CT abdomen. Disposition-okay to discharge from my standpoint  Lung metastasis-will make appointment for the patient be seen by Dr. Jose Freeman next week. Hypoxia-assess the need for home O2.     Shana Marinelli MD

## 2021-06-03 VITALS
RESPIRATION RATE: 16 BRPM | HEART RATE: 111 BPM | DIASTOLIC BLOOD PRESSURE: 90 MMHG | TEMPERATURE: 97 F | OXYGEN SATURATION: 91 % | HEIGHT: 73 IN | SYSTOLIC BLOOD PRESSURE: 132 MMHG | BODY MASS INDEX: 27.74 KG/M2 | WEIGHT: 209.3 LBS

## 2021-06-03 PROBLEM — C18.9 PRIMARY COLON CANCER WITH METASTASIS TO OTHER SITE (HCC): Status: ACTIVE | Noted: 2021-01-01

## 2021-06-03 LAB
ANION GAP SERPL CALCULATED.3IONS-SCNC: 13 MMOL/L (ref 7–19)
BASOPHILS ABSOLUTE: 0 K/UL (ref 0–0.2)
BASOPHILS RELATIVE PERCENT: 0.7 % (ref 0–1)
BUN BLDV-MCNC: 10 MG/DL (ref 6–20)
CALCIUM SERPL-MCNC: 8.8 MG/DL (ref 8.6–10)
CHLORIDE BLD-SCNC: 98 MMOL/L (ref 98–111)
CO2: 29 MMOL/L (ref 22–29)
CREAT SERPL-MCNC: 0.9 MG/DL (ref 0.5–1.2)
EOSINOPHILS ABSOLUTE: 0.1 K/UL (ref 0–0.6)
EOSINOPHILS RELATIVE PERCENT: 2.1 % (ref 0–5)
FOLATE: 4.6 NG/ML (ref 4.5–32.2)
GFR AFRICAN AMERICAN: >59
GFR NON-AFRICAN AMERICAN: >60
GLUCOSE BLD-MCNC: 119 MG/DL (ref 74–109)
GLUCOSE BLD-MCNC: 128 MG/DL (ref 70–99)
GLUCOSE BLD-MCNC: 197 MG/DL (ref 70–99)
HCT VFR BLD CALC: 47 % (ref 42–52)
HEMOGLOBIN: 15.2 G/DL (ref 14–18)
IMMATURE GRANULOCYTES #: 0 K/UL
LYMPHOCYTES ABSOLUTE: 0.5 K/UL (ref 1.1–4.5)
LYMPHOCYTES RELATIVE PERCENT: 12.1 % (ref 20–40)
MAGNESIUM: 2.1 MG/DL (ref 1.6–2.6)
MCH RBC QN AUTO: 28.8 PG (ref 27–31)
MCHC RBC AUTO-ENTMCNC: 32.3 G/DL (ref 33–37)
MCV RBC AUTO: 89.2 FL (ref 80–94)
MONOCYTES ABSOLUTE: 0.7 K/UL (ref 0–0.9)
MONOCYTES RELATIVE PERCENT: 16.7 % (ref 0–10)
NEUTROPHILS ABSOLUTE: 2.9 K/UL (ref 1.5–7.5)
NEUTROPHILS RELATIVE PERCENT: 67.7 % (ref 50–65)
PDW BLD-RTO: 17.5 % (ref 11.5–14.5)
PERFORMED ON: ABNORMAL
PERFORMED ON: ABNORMAL
PLATELET # BLD: 201 K/UL (ref 130–400)
PMV BLD AUTO: 10.4 FL (ref 9.4–12.4)
POTASSIUM REFLEX MAGNESIUM: 3.5 MMOL/L (ref 3.5–5)
RBC # BLD: 5.27 M/UL (ref 4.7–6.1)
SODIUM BLD-SCNC: 140 MMOL/L (ref 136–145)
TSH SERPL DL<=0.05 MIU/L-ACNC: 1.64 UIU/ML (ref 0.27–4.2)
VITAMIN B-12: 218 PG/ML (ref 211–946)
WBC # BLD: 4.3 K/UL (ref 4.8–10.8)

## 2021-06-03 PROCEDURE — 82607 VITAMIN B-12: CPT

## 2021-06-03 PROCEDURE — 94640 AIRWAY INHALATION TREATMENT: CPT

## 2021-06-03 PROCEDURE — 99231 SBSQ HOSP IP/OBS SF/LOW 25: CPT | Performed by: INTERNAL MEDICINE

## 2021-06-03 PROCEDURE — 84443 ASSAY THYROID STIM HORMONE: CPT

## 2021-06-03 PROCEDURE — 97535 SELF CARE MNGMENT TRAINING: CPT

## 2021-06-03 PROCEDURE — 6370000000 HC RX 637 (ALT 250 FOR IP): Performed by: STUDENT IN AN ORGANIZED HEALTH CARE EDUCATION/TRAINING PROGRAM

## 2021-06-03 PROCEDURE — 36415 COLL VENOUS BLD VENIPUNCTURE: CPT

## 2021-06-03 PROCEDURE — 6370000000 HC RX 637 (ALT 250 FOR IP): Performed by: PHYSICIAN ASSISTANT

## 2021-06-03 PROCEDURE — 83735 ASSAY OF MAGNESIUM: CPT

## 2021-06-03 PROCEDURE — 82947 ASSAY GLUCOSE BLOOD QUANT: CPT

## 2021-06-03 PROCEDURE — 85025 COMPLETE CBC W/AUTO DIFF WBC: CPT

## 2021-06-03 PROCEDURE — 87205 SMEAR GRAM STAIN: CPT

## 2021-06-03 PROCEDURE — 87070 CULTURE OTHR SPECIMN AEROBIC: CPT

## 2021-06-03 PROCEDURE — 2700000000 HC OXYGEN THERAPY PER DAY

## 2021-06-03 PROCEDURE — 6370000000 HC RX 637 (ALT 250 FOR IP): Performed by: HOSPITALIST

## 2021-06-03 PROCEDURE — 80048 BASIC METABOLIC PNL TOTAL CA: CPT

## 2021-06-03 PROCEDURE — 82746 ASSAY OF FOLIC ACID SERUM: CPT

## 2021-06-03 PROCEDURE — 2580000003 HC RX 258: Performed by: STUDENT IN AN ORGANIZED HEALTH CARE EDUCATION/TRAINING PROGRAM

## 2021-06-03 PROCEDURE — 99233 SBSQ HOSP IP/OBS HIGH 50: CPT | Performed by: PHYSICIAN ASSISTANT

## 2021-06-03 PROCEDURE — 6360000002 HC RX W HCPCS: Performed by: HOSPITALIST

## 2021-06-03 PROCEDURE — 6360000002 HC RX W HCPCS: Performed by: STUDENT IN AN ORGANIZED HEALTH CARE EDUCATION/TRAINING PROGRAM

## 2021-06-03 RX ORDER — CYANOCOBALAMIN 1000 UG/ML
1000 INJECTION INTRAMUSCULAR; SUBCUTANEOUS ONCE
Status: COMPLETED | OUTPATIENT
Start: 2021-06-03 | End: 2021-06-03

## 2021-06-03 RX ORDER — FUROSEMIDE 10 MG/ML
40 INJECTION INTRAMUSCULAR; INTRAVENOUS ONCE
Status: COMPLETED | OUTPATIENT
Start: 2021-06-03 | End: 2021-06-03

## 2021-06-03 RX ORDER — FOLIC ACID 1 MG/1
1 TABLET ORAL DAILY
Qty: 30 TABLET | Refills: 0 | Status: SHIPPED | OUTPATIENT
Start: 2021-06-04 | End: 2021-07-04

## 2021-06-03 RX ORDER — HYDROCODONE BITARTRATE AND ACETAMINOPHEN 5; 325 MG/1; MG/1
1 TABLET ORAL EVERY 4 HOURS PRN
Qty: 18 TABLET | Refills: 0 | Status: SHIPPED | OUTPATIENT
Start: 2021-06-03 | End: 2021-06-23 | Stop reason: SDUPTHER

## 2021-06-03 RX ORDER — FOLIC ACID 1 MG/1
1 TABLET ORAL DAILY
Status: DISCONTINUED | OUTPATIENT
Start: 2021-06-03 | End: 2021-06-03 | Stop reason: HOSPADM

## 2021-06-03 RX ORDER — IPRATROPIUM BROMIDE AND ALBUTEROL SULFATE 2.5; .5 MG/3ML; MG/3ML
3 SOLUTION RESPIRATORY (INHALATION)
Qty: 360 ML | Refills: 0 | Status: SHIPPED | OUTPATIENT
Start: 2021-06-03 | End: 2021-07-03

## 2021-06-03 RX ORDER — AMOXICILLIN AND CLAVULANATE POTASSIUM 500; 125 MG/1; MG/1
1 TABLET, FILM COATED ORAL 3 TIMES DAILY
Qty: 15 TABLET | Refills: 0 | Status: SHIPPED | OUTPATIENT
Start: 2021-06-03 | End: 2021-06-08

## 2021-06-03 RX ADMIN — ENOXAPARIN SODIUM 40 MG: 40 INJECTION SUBCUTANEOUS at 08:55

## 2021-06-03 RX ADMIN — IPRATROPIUM BROMIDE AND ALBUTEROL SULFATE 1 AMPULE: .5; 3 SOLUTION RESPIRATORY (INHALATION) at 11:07

## 2021-06-03 RX ADMIN — HYDROCODONE BITARTRATE AND ACETAMINOPHEN 1 TABLET: 5; 325 TABLET ORAL at 08:55

## 2021-06-03 RX ADMIN — FUROSEMIDE 40 MG: 10 INJECTION, SOLUTION INTRAMUSCULAR; INTRAVENOUS at 08:55

## 2021-06-03 RX ADMIN — SODIUM CHLORIDE, PRESERVATIVE FREE 1000 MG: 5 INJECTION INTRAVENOUS at 08:54

## 2021-06-03 RX ADMIN — AZITHROMYCIN 500 MG: 250 TABLET, FILM COATED ORAL at 08:55

## 2021-06-03 RX ADMIN — SODIUM CHLORIDE, PRESERVATIVE FREE 10 ML: 5 INJECTION INTRAVENOUS at 08:55

## 2021-06-03 RX ADMIN — INSULIN LISPRO 2 UNITS: 100 INJECTION, SOLUTION INTRAVENOUS; SUBCUTANEOUS at 13:23

## 2021-06-03 RX ADMIN — CYANOCOBALAMIN 1000 MCG: 1000 INJECTION, SOLUTION INTRAMUSCULAR; SUBCUTANEOUS at 08:55

## 2021-06-03 RX ADMIN — IPRATROPIUM BROMIDE AND ALBUTEROL SULFATE 1 AMPULE: .5; 3 SOLUTION RESPIRATORY (INHALATION) at 06:53

## 2021-06-03 RX ADMIN — GUAIFENESIN 1200 MG: 600 TABLET, EXTENDED RELEASE ORAL at 08:57

## 2021-06-03 RX ADMIN — FOLIC ACID 1 MG: 1 TABLET ORAL at 08:57

## 2021-06-03 ASSESSMENT — PAIN DESCRIPTION - LOCATION: LOCATION: FOOT

## 2021-06-03 ASSESSMENT — PAIN SCALES - GENERAL
PAINLEVEL_OUTOF10: 3
PAINLEVEL_OUTOF10: 5
PAINLEVEL_OUTOF10: 3
PAINLEVEL_OUTOF10: 1

## 2021-06-03 ASSESSMENT — PAIN DESCRIPTION - PAIN TYPE: TYPE: ACUTE PAIN

## 2021-06-03 ASSESSMENT — PAIN DESCRIPTION - PROGRESSION: CLINICAL_PROGRESSION: NOT CHANGED

## 2021-06-03 ASSESSMENT — PAIN DESCRIPTION - ORIENTATION: ORIENTATION: RIGHT;LEFT

## 2021-06-03 ASSESSMENT — PAIN DESCRIPTION - FREQUENCY: FREQUENCY: CONTINUOUS

## 2021-06-03 ASSESSMENT — PAIN DESCRIPTION - DESCRIPTORS: DESCRIPTORS: DISCOMFORT;SORE

## 2021-06-03 NOTE — DISCHARGE INSTR - DIET

## 2021-06-03 NOTE — PROGRESS NOTES
Occupational Therapy     06/03/21 1114   Pre Treatment Pain Screening   Pain at present 3   Intervention List Patient able to continue with treatment   Comments / Details Pt complains of pain in his feet. Pt able to continue with therapy with encouragement. Restrictions/Precautions   Restrictions/Precautions Fall Risk   Required Braces or Orthoses? No   Hearing   Hearing Exceptions Hard of hearing/hearing concerns   General   Chart Reviewed Yes   Patient assessed for rehabilitation services? Yes   Additional Pertinent Hx Colon CA, Metastatic to malignant neoplasm of lung, PNA, needs O2, PE, Liver mets 2018   Response to previous treatment Patient with no complaints from previous session   Family / Caregiver Present No   Referring Practitioner Thi Ferrari MD   Diagnosis Post obstructive pneumonia due to foreign body aspiration   Subjective   Subjective Pt requires encouragement to participate. Pain Assessment   Patient Currently in Pain Yes   Pain Assessment 0-10   Pain Level 3   Pain Type Acute pain   Pain Location Foot   Pain Orientation Right;Left   Pain Descriptors Discomfort; Sore   Pain Frequency Continuous   Clinical Progression Not changed   Patient's Stated Pain Goal No pain   Response to Pain Intervention Patient Satisfied   Pre Treatment Pain Screening   Pain at present 3   Scale Used Numeric Score   Intervention List Patient able to continue with treatment   ADL   Feeding Independent   Grooming Independent   UE Bathing Independent   LE Bathing Independent   UE Dressing Independent   LE Dressing Independent   Toileting Independent   Balance   Sitting Balance Independent   Standing Balance Supervision   Functional Mobility   Functional - Mobility Device No device   Activity To/from bathroom   Assist Level Supervision   Bed mobility   Bridging Independent   Rolling to Left Independent   Rolling to Right Independent   Supine to Sit Independent   Sit to Supine Independent   Scooting Independent Transfers   Stand Step Transfers Independent   Sit to stand Independent   Stand to sit Independent   Toilet Transfers   Toilet - Technique Stand step   Equipment Used Raised toilet seat with rails   Toilet Transfer Supervision   Short term goals   Short term goal 1 Pt to demonstrate safety techniques and independence of showering and O2 to stay above 85%. Short term goal 2 Pt to demonstrate independence of self cares. Short term goal 3 Pt to increase activity tolerance to 10 minutes of dyn. standing. Assessment   Performance deficits / Impairments Decreased safe awareness;Decreased balance;Decreased high-level IADLs  (SOB during activity )   Assessment Pt has SOB with activity, pt requires assistance with supplemental O2 when walking. Treatment Diagnosis Weakness   Prognosis Good   Assistance / Modification Pt requires minimal/ CGA assistance when his O2 levels decrease. REQUIRES OT FOLLOW UP Yes   Treatment Initiated  Tx focused on safety during toileting and h/g tasks at sink in standing; safety with nasal canula. Discharge Recommendations Home with assist PRN   Activity Tolerance   Activity Tolerance Patient limited by fatigue   Safety Devices   Safety Devices in place N/A   Plan   Times per week 2-5xwk   Times per day Daily   Plan weeks 2   Current Treatment Recommendations Strengthening; Endurance Training;Self-Care / ADL; Balance Training   Plan Comment OT to treat 2-5X wk for self cares and ADLS.    Electronically signed by SHANIA Fregoso on 6/3/2021 at 11:27 AM

## 2021-06-03 NOTE — DISCHARGE SUMMARY
Physician Discharge Summary     Patient ID:  Montserrat Valenzuela  [id number]  22 y.o.  1960    Admit date: 5/30/2021    Discharge date and time: 6/3/2021    Admitting Physician: Karlee Fraga MD     Discharge Physician: Nidhi Kauffman MD MD    Discharge Diagnoses:     · Post obstructive pna with hemoptysis- cont ab- follow up OP with pulmonary  · Extensive metastatic disease in the chest and upper abdomen- follow up as OP with pulmonary and oncology  · DM2  · Chronic LE nubmness- follow up as OP  · Primary colon cancer with metastasis to other site   · Palliative care patient    Discharged Condition: stable    Indication for Admission: dyspnea     Hospital Course:     \"Patient is a 64 y.o. male with diabetes mellitus, metastatic colon cancer followed by oncology in [facility] [place] on chemotherapy every 2 weeks, history of surgical resection and radiation therapy, recently moved to [place], presented to ED with progressive dyspnea and cough over the past 2 months with subsequent severe worsening symptoms earlier this a.m. He has had intermittently productive sputum with mucus and occasional scant hemoptysis. He denies any fever/chills, chest pain, palpitations, abdominal pain. He has not noticed any recent weight loss. Work-up in the ED significant for CTA chest which was negative for PE, but showed extensive metastatic disease involving lungs with an upper lobe mass with invasion into the mediastinum and invasion into the right mainstem bronchus, as well as tree-in-bud nodularity in RML and RLL concerning for postobstructive pneumonia. There is also apparent metastatic disease involving the liver and adrenal glands, as well as a possible tumor thrombus in IVC adjacent to the right adrenal gland. \" per Dr Celso Hurley    Pt was treated with IV ab and duonebs, he was evaluated by pulmonary and oncology. Per pulmonary he may need to be referred to tertiary center, that needs to be done as OP.  Pt is stable for DC home today on home oxygen with [facility].         Consults: pulmonary/intensive care and hematology/oncology    Significant Diagnostic Studies:     CTA PULMONARY W CONTRAST [[id number]] Resulted: 05/30/21 1422      Order Status: Completed Updated: 05/30/21 1424     Narrative:       Exam: CTA PULMONARY W CONTRAST - 5/30/2021 1:43 PM   Indication: Cough, hypoxia, history of cancer   Comparison: None available. DLP: 1168 mGy cm. In order to have a CT radiation dose as low as   reasonably achievable, Automated Exposure Control was utilized for   adjustment of the mA and/or KV according to patient size. Findings:   Evaluation for PE slightly limited by contrast bolus timing. No   evidence of pulmonary embolus. RIGHT chest port with catheter tip in   the SVC. Thoracic aorta is nonaneurysmal. No pericardial effusion. 6.8 x 4.8 cm RIGHT hilar/upper lobe mass with extensive airway and   vascular encasement. There is direct extension into the mediastinum   with partial encasement and apparent direct invasion of the RIGHT   mainstem bronchus and bronchus intermedius, intraluminal soft tissue   and focal severe narrowing. Tree-in-bud nodularity throughout the   RIGHT lower and RIGHT middle lobe is present, differential for which   includes endobronchial splaying of neoplasm and postobstructive   pneumonia. The RIGHT lung does remain aerated. 5.0 x 5.5 cm subpleural LEFT lower lobe mass. 11 mm LEFT lower lobe   pulmonary nodule. No pleural effusion or pneumothorax. Enlarged   mediastinal and bilateral hilar lymph nodes. No acute chest wall soft tissue abnormality. 11 mm hypodense caudate   liver lesion. Bilateral adrenal gland masses. On the RIGHT, there is   concern for tumor thrombus within the IVC. No aggressive osseous   lesion identified.     Impression:       1.  No evidence of pulmonary embolus. 2.  Extensive metastatic disease in the chest and upper abdomen.  6.8   cm RIGHT hilar/upper lobe mass with extensive vascular and airway   encasement and direct mediastinal invasion with encasement and direct   invasion of the RIGHT mainstem bronchus and bronchus intermedius. RIGHT lower lobe and middle lobe tree-in-bud nodularity is present   with differential including endobronchial spread of neoplasm and   postobstructive pneumonia. There is also a 5.5 cm mass in the   subpleural LEFT lower lobe as well as an 11 mm LEFT lower lobe   pulmonary nodule. Mediastinal and bilateral hilar lymphadenopathy is   present. In the upper abdomen, at least one liver metastasis and   bilateral adrenal gland masses are present. Suspected tumor thrombus   in the IVC adjacent to the RIGHT adrenal gland. Signed by Dr Meri Martinez on 5/30/2021 3:22 PM     XR CHEST PORTABLE [[id number]] Resulted: 05/30/21 1227     Order Status: Completed Updated: 05/30/21 1229     Narrative:       Exam: XR CHEST PORTABLE - 5/30/2021 11:55 AM   Indication: Shortness of breath, history of colon cancer   Comparison: None available. Findings:   Cardiac silhouette is normal in size. RIGHT chest wall port catheter   tip overlying the SVC. 6.5 cm LEFT lower lobe lung mass. 5.3 cm RIGHT   suprahilar lung mass. No consolidative airspace process. No pleural   effusion or pneumothorax. No acute osseous finding.     Impression:       1.  No evidence of consolidation to suggest lobar pneumonia. 2.  RIGHT upper lobe and LEFT lower lobe lung masses, likely   representing metastasis related to history of colon cancer. Correlate   with clinical history and outside imaging (patient reportedly under   treatment for colon cancer in [place] [id number]).    Signed by Dr Meri Martinez on 5/30/2021 1:27 PM             Discharge Exam:  BP (!) 152/93   Pulse 75   Temp 97.6 °F (36.4 °C) (Temporal)   Resp 20   Ht 6' 1\" (1.854 m)   Wt 209 lb 4.8 oz (94.9 kg)   SpO2 91%   BMI 27.61 kg/m²     General Appearance:    Alert, cooperative, no distress, appears stated age Head:    Normocephalic, without obvious abnormality, atraumatic   Eyes:    PERRL, conjunctiva/corneas clear, EOM's intact, fundi     benign, both eyes                    Neck:   Supple, symmetrical, trachea midline, no adenopathy;        thyroid:  No enlargement/tenderness/nodules; no carotid    bruit or JVD       Lungs:     Clear to auscultation bilaterally, respirations unlabored       Heart:    Regular rate and rhythm, S1 and S2 normal, no murmur, rub   or gallop   Abdomen:     Soft, non-tender, bowel sounds active all four quadrants,     no masses, no organomegaly           Extremities:   Extremities normal, atraumatic, no cyanosis or edema   Pulses:   2+ and symmetric all extremities   Skin:   Skin color, texture, turgor normal, no rashes or lesions       Neurologic:   CNII-XII intact. Normal strength, sensation and reflexes       throughout       Discharge Medications:       Ben Wilkes   Home Medication Instructions RPR:[id number]    Printed on:06/03/21 1154   Medication Information                      albuterol sulfate HFA (VENTOLIN HFA) 108 (90 Base) MCG/ACT inhaler  Inhale 2 puffs into the lungs every 6 hours as needed for Wheezing             amoxicillin-clavulanate (AUGMENTIN) 500-125 MG per tablet  Take 1 tablet by mouth 3 times daily for 5 days             apixaban (ELIQUIS) 5 MG TABS tablet  Take by mouth 2 times daily             folic acid (FOLVITE) 1 MG tablet  Take 1 tablet by mouth daily             hydroCHLOROthiazide (HYDRODIURIL) 25 MG tablet  Take 25 mg by mouth daily             HYDROcodone-acetaminophen (NORCO) 5-325 MG per tablet  Take 1 tablet by mouth every 4 hours as needed for Pain for up to 3 days.              insulin glargine (LANTUS;BASAGLAR) 100 UNIT/ML injection pen  Inject 32 Units into the skin daily             insulin lispro (HUMALOG KWIKPEN U-200) 200 UNIT/ML SOPN pen  Inject 8 Units into the skin 3 times daily as needed             ipratropium-albuterol (DUONEB) 0.5-2.5 (3) MG/3ML SOLN nebulizer solution  Inhale 3 mLs into the lungs every 4 hours (while awake)             Nebulizers (COMPRESSOR/NEBULIZER) MISC  1 vial by Does not apply route 4 times daily             prochlorperazine (COMPAZINE) 10 MG tablet  Take 10 mg by mouth every 6 hours as needed                   Discharge Instructions: Follow up with Valeria Thomas MD, MD in 3 days. With Dr Otis Gross in 3 days. With Dr Franklin Jorgensen in one week. Take medications as directed. Resume activity as tolerated. Hold nebs for HR above 100    Diet: DIET GENERAL; Carb Control: 3 carb choices (45 gms)/meal; No Added Salt (3-4 GM)     Disposition: Patient is medically stable and will be discharged home with home oxygen and HH.     Dc time 40 min

## 2021-06-03 NOTE — PROGRESS NOTES
HOSPITAL MEDICINE  - PROGRESS NOTE    Admit Date: 5/30/2021         CC: dyspnea    Subjective: dyspnea better with oxygen    Objective: mild distress    Diet: DIET GENERAL; Carb Control: 3 carb choices (45 gms)/meal; No Added Salt (3-4 GM)  Pain is:Mild  Nausea:None  Bowel Movement/Flatus yes    Data:   Scheduled Meds: Reviewed  Current Facility-Administered Medications   Medication Dose Route Frequency Provider Last Rate Last Admin    HYDROcodone-acetaminophen (NORCO) 5-325 MG per tablet 1 tablet  1 tablet Oral Q4H PRN Kerrie Melendez PA-C   1 tablet at 06/02/21 0518    insulin glargine (LANTUS) injection vial 32 Units  32 Units Subcutaneous Nightly Rachele Hoskins MD   32 Units at 06/01/21 2053    insulin lispro (HUMALOG) injection vial 8 Units  8 Units Subcutaneous TID WC Rachele Hoskins MD   8 Units at 06/02/21 1743    prochlorperazine (COMPAZINE) tablet 10 mg  10 mg Oral Q6H PRN Rachele Hoskins MD        cefTRIAXone (ROCEPHIN) 1,000 mg in sodium chloride (PF) 10 mL IV syringe  1,000 mg Intravenous Q24H Rachele Hoskins MD   1,000 mg at 06/02/21 0844    azithromycin (ZITHROMAX) tablet 500 mg  500 mg Oral Daily Rachele Hoskins MD   500 mg at 06/02/21 0845    sodium chloride flush 0.9 % injection 5-40 mL  5-40 mL Intravenous 2 times per day Rachele Hoskins MD   10 mL at 06/02/21 0912    sodium chloride flush 0.9 % injection 5-40 mL  5-40 mL Intravenous PRN Rachele Hoskins MD        0.9 % sodium chloride infusion  25 mL Intravenous PRN Rachele Hoskins MD        enoxaparin (LOVENOX) injection 40 mg  40 mg Subcutaneous Daily Rachele Hoskins MD   40 mg at 06/02/21 0844    promethazine (PHENERGAN) tablet 12.5 mg  12.5 mg Oral Q6H PRN Rachele Hoskins MD        Or    ondansetron [facility]) injection 4 mg  4 mg Intravenous Q6H PRN Rachele Hoskins MD   4 mg at 05/30/21 1625    polyethylene glycol (GLYCOLAX) packet 17 g  17 g Oral Daily PRN [facility] MD [place]        acetaminophen (TYLENOL) tablet 650 mg  650 mg Oral Q6H PRN Maura Chopra MD   650 mg at 06/02/21 1438    Or    acetaminophen (TYLENOL) suppository 650 mg  650 mg Rectal Q6H PRN Maura Chopra MD        0.9 % sodium chloride infusion   Intravenous Continuous Maura Chopra  mL/hr at 05/30/21 1640 New Bag at 05/30/21 1640    potassium chloride (KLOR-CON M) extended release tablet 40 mEq  40 mEq Oral PRN Maura Chopra MD        Or   [facility] potassium bicarb-citric acid (EFFER-K) effervescent tablet 40 mEq  40 mEq Oral PRN Maura Chopra MD        Or   [facility] potassium chloride 10 mEq/100 mL IVPB (Peripheral Line)  10 mEq Intravenous PRN Maura Chopra MD        magnesium sulfate 2000 mg in 50 mL IVPB premix  2,000 mg Intravenous PRN Maura Chopra MD        insulin lispro (HUMALOG) injection vial 0-12 Units  0-12 Units Subcutaneous TID WC Maura Chopra MD   2 Units at 06/02/21 1735    insulin lispro (HUMALOG) injection vial 0-6 Units  0-6 Units Subcutaneous Nightly Maura Chopra MD   1 Units at 05/30/21 2117    ipratropium-albuterol (DUONEB) nebulizer solution 1 ampule  1 ampule Inhalation Q4H WA Maura Chopra MD   1 ampule at 06/02/21 1832     DVT Prophylaxis: Lovenox 40 mg sq daily    Continuous Infusions:   sodium chloride      sodium chloride 100 mL/hr at 05/30/21 1640       Intake/Output Summary (Last 24 hours) at 6/2/2021 1920  Last data filed at 6/2/2021 1000  Gross per 24 hour   Intake 730 ml   Output --   Net 730 ml     CBC:   Recent Labs     05/31/21  0334 06/01/21  0357 06/02/21  0357   WBC 5.7 5.7 3.9*   HGB 15.0 14.7 15.0    179 180     BMP:  Recent Labs     05/31/21  0334 06/01/21  0357 06/02/21  0357    141 141   K 3.7 3.5 3.6    101 101   CO2 30* 32* 28   BUN 8 8 10   CREATININE 1.0 1.1 1.0   GLUCOSE 122* 141* 136*     ABGs:   Lab Results   Component Value Date    PHART 7.430 05/30/2021    PO2ART 63.0 05/30/2021    CEK9CUM 35.0 05/30/2021     INR:   Recent Labs     05/31/21  0334   INR 1.17         Objective:   Vitals: /81   Pulse 91   Temp 96.6 °F (35.9 °C) (Temporal)   Resp 18   Ht 6' 1\" (1.854 m)   Wt 207 lb 14.4 oz (94.3 kg)   SpO2 97%   BMI 27.43 kg/m²   General appearance: alert, appears stated age and cooperative  Skin: Skin color, texture, turgor normal.   HEENT: Head: Normocephalic, no lesions, without obvious abnormality.   Neck: no adenopathy, no carotid bruit, no JVD and supple, symmetrical, trachea midline  Lungs: clear to auscultation bilaterally  Heart: regular rate and rhythm, S1, S2 normal, no murmur, click, rub or gallop  Abdomen: soft, non-tender; bowel sounds normal; no masses,  no organomegaly  Extremities: extremities normal, atraumatic, no cyanosis or edema  Lymphatic: No significant lymph node enlargement papable  Neurologic: Mental status: Alert, oriented, thought content appropriate        Assessment & Plan:    · Post obstructive pna with hemoptysis- cont ab- collect sputum cultures tonight   · extensive metastatic disease in the chest and upper abdomen- per pulmonary and oncology  · DM2- ISS, lantus  · Chronic LE nubmness- check folate, b12, pulses- follow up as OP    Patient Active Problem List:     Primary colon cancer with metastasis to other site [facility])     Palliative care patient      See orders   Disposition: home tomorrow if ok with pulmonary, oncology cleared for dc today    Kyle Cervantes MD

## 2021-06-03 NOTE — PROGRESS NOTES
PROGRESS NOTE    Pt Name: Rubens Garcia  MRN: [id number]  YOB: 1960  Date of evaluation: 6/3/2021      Subjective no new complaints this morning. HISTORY OF PRESENT ILLNESS:   Diagnosis  Metastatic colonic adenocarcinoma, August 2017  pT3N2a, stage IIIB  Pulmonary embolism, May 2019  K-michael mutated  MSI stable  PIK3CA mutated  Liver metastasis, January 2018     Treatment summary  Modified FOLFOX 6  5-FU/leucovorin  SBRT right upper lobe lung mass  FOLFIRI        Target lesions  Liver metastasis  Right upper lobe/left lower lobe lung metastasis  Adrenal metastasis bilaterally     Roya Gómez was first seen by Dr. Whit Sidhu on 5/31/2021. Patient is a pleasant 64years old male who is a patient of Dr. Kaylene Brody oncologist at [facility] [place] in [facility]. Patient was initially diagnosed in July 2017 when he was hospitalized for colitis. CT abdomen showed thickening of the lower right colon. A colonoscopy was performed in August 2017 that showed a large fungating mass in the cecum and proximal ascending colon. Biopsy was consistent with invasive carcinoma with mucinous features. Staging CT scans showed a left lower lobe micronodule measuring 3 mm and a 1.6 cm left adrenal nodule in September 2017. The patient underwent a laparoscopic open right colectomy, partial omentectomy and partial peritonectomy on 10/17/2017. Pathology consistent with differentiated mucinous adenocarcinoma involving the cecal pouch, ileocecal valve and extending intraluminally into the terminal ileus. For tumor deposits present. Final pathologic staging consistent with K-michael mutated, for positive lymph nodes [id number], stage IIIB colon cancer. He received adjuvant modified FOLFOX 6 started November 2017. Unfortunately biopsy-proven metastatic disease to the liver documented in January 2018. Avastin was added to his regimen.   He completed 10 cycles of oxaliplatin which was discontinued. Avastin was added on for cycle #5. He then completed 32 cycles of 5-FU/Avastin. He developed pulmonary embolism and was started on Lovenox and later on switched to Eliquis in August 2019. In July 2020 he had disease progression and was started on FOLFIRI. He received hyperfractionated RT to left lower lobe nodule July 2019. He was last seen by his oncologist on 05/12/2021. Patient was considering moving care to [place] where he lives with his daughter. Apparently his last chemotherapy was in November 2020 and the patient was lost to follow-up. He was seen again in February 2021 and received a cycle of FOLFIRI. Poor compliance to his medical visit with his oncologist due to several logistic problems. 3/5/2021-CT chest performed at the [facility] showed the previously seen right upper lobe mass has significantly enlarged currently measuring 5.0 x 5.5 x 6.9 cm and extending into and at some levels is indistinct from the right bronchovascular structures with also some narrowing of the pulmonary arteries of this region. There are multiple small satellite nodular opacities surrounding this mass. The previously seen left lower lobe mass has significantly enlarged currently measuring 5.0 x 4.7 x 4.7 cm. There is a newly developed small left lower lobe nodule measuring 7.5 mm.   3/5/2021-CT abdomen pelvis showed bilateral adrenal nodules with a left adrenal nodule measuring 3.9 x 1.6 cm. The right adrenal nodule measured 1.8 cm. Subcentimeter low-density liver lesions and additional subcentimeter hyperenhancing focus in the liver, change. Possible wall thickening of the distal colon and rectum. Status post right hemicolectomy.   5/12/2021-he was last seen by his oncologist.  Patient plans to move care to [place].  5/14/2021-last infusion of palliative FOLFIRI  5/30/2021-the patient presented to the ED department at [facility] with complaints of increased short of breath lymphadenopathy  NEUROLOGIC: follows commands, non focal   PSYCH: mood and affect appropriate. Alert and oriented to time, place, person        LABORATORY RESULTS REVIEWED BY ME:  Recent Labs     06/03/21  0307 06/02/21  0357 06/01/21  0357   WBC 4.3* 3.9* 5.7   HGB 15.2 15.0 14.7   HCT 47.0 47.0 46.2   MCV 89.2 91.3 90.4    180 179       Lab Results   Component Value Date     06/03/2021    K 3.5 06/03/2021    CL 98 06/03/2021    CO2 29 06/03/2021    BUN 10 06/03/2021    CREATININE 0.9 06/03/2021    GLUCOSE 119 (H) 06/03/2021    CALCIUM 8.8 06/03/2021    PROT 6.5 (L) 05/30/2021    LABALBU 3.7 05/30/2021    BILITOT 1.0 05/30/2021    ALKPHOS 114 05/30/2021    AST 10 05/30/2021    ALT 8 05/30/2021    LABGLOM >60 06/03/2021    GFRAA >59 06/03/2021       Lab Results   Component Value Date    INR 1.17 05/31/2021    PROTIME 14.9 (H) 05/31/2021       RADIOLOGY STUDIES REVIEWED BY ME:    ASSESSMENT:     #Dyspnea-likely secondary to lung metastasis with large mass invading the right mainstem bronchus and associated pneumonia  Agree with continue antibiotics. Question is if the patient is a candidate for bronchial stent placement.  assisting - Recommended considering palliative radiation in order to reduce the tumor burden in the right hilar area and control hemoptysis. If not candidate for radiation could possibly be candidate for stent placement at tertiary center. #Colon cancer (liver metastasis, adrenal metastasis, lung metastasis, retroperitoneal node metastasis) K-michael mutated, MSI stable  Status post modified FOLFOX x10 cycles with Avastin added on for cycle 5  Maintenance 5-FU/Avastin for 24 cycles. Recurrent disease March 2021. Currently on FOLFIRI     CEA 33.7 on 6/1/2021     Postobstructive pneumonia-continue current antibiotics (oral Zithromax and ceftriaxone IV)  Improving.      PLAN:  Continue current supportive care  Continue antibiotics  Appreciate pulmonology's assistance  Appreciate palliative care consultation     Will arrange follow-up appointment with Dr. Susan Gasca next week. CT abdomen and pelvis in the outpatient setting on 6/7/2021 with arrival time at 7:30 AM.  Follow-up in clinic with Dr. Shaka Moise on 6/9/2021 at 8:30 AM to continue palliative chemotherapy with FOLFIRI with a 20% dose reduction     Okay from oncology standpoint to discharge when appropriate with others.   Dinah Casas MD    06/03/21  7:00 AM

## 2021-06-04 PROBLEM — C18.0 CECAL CANCER (HCC): Status: ACTIVE | Noted: 2017-10-17

## 2021-06-04 LAB
BLOOD CULTURE, ROUTINE: NORMAL
CULTURE, BLOOD 2: NORMAL

## 2021-06-05 LAB
CULTURE, RESPIRATORY: NORMAL
GRAM STAIN RESULT: NORMAL

## 2021-06-06 NOTE — PROGRESS NOTES
[facility] Lesvia Ocampo BSN, PA-C  ________________________________________  [facility]  Department of Radiation Oncology  [address] [place] [id number]  Office:  [phone]  Fax: [phone]    DATE:  06/07/2021  PATIENT:  Anupam Buchanan 1960                 MEDICAL RECORD #:  [id number]                                                       REASON FOR VISIT  Chief Complaint   Patient presents with   • Cancer     Colon cancer with mets to lung     Anupam Buchanan is a very pleasant male that has been referred to our office for radiotherapy considerations for Stage IV Adenocarcinoma of the colon metastatic to lung. Reports hemoptysis, wheezing, SOB.     History of Present Illness:  Diagnosed in August 2017 with Stage IIIB (pT3, N2a, cM0, G2) Adenocarcinoma of the Colon, mucinous in cecum.  Underwent right colectomy and LN dissection on 10/17/17, pathology revealed 4/31 LN, and 4 tumor deposits with one involving the peritoneal surface, KRAS mutated, PIK3CA mutated. Has a 1.6 cm left adrenal nodule. He was started on adjuvant chemo with mFOLFOX6 on 11/28/17.  · PROGRESSION: 03/05/2021 CT Chest/abd pelvis revealed RUL lung mass 6.9 cm, extending into right bronchovascular structures with narrowing of  pulmonary arteries with multiple satellite nodular opacities surrounding mass, LLL mass has significantly enlarged 5.0 cm, new LLL nodule, 7.5 mm. Bilateral adrenal nodules with a left adrenal nodule measuring 3.9 cm, right adrenal nodule 1.8 cm. Last infusion of palliative FOLFIRI delivered 05/14/21  · 05/30/2021 - Presented to the ED department at [facility] with complaints of increased short of breath. CT of chest revealed extensive metastatic disease in  chest and upper abdomen. 6.8 cm RIGHT hilar/upper lobe mass with extensive vascular and airway encasement and direct mediastinal invasion with encasement and direct invasion of the RIGHT  mainstem bronchus and bronchus intermedius. RIGHT lower lobe and middle lobe tree-in-bud nodularity is present with differential including endobronchial spread of neoplasm and postobstructive pneumonia. There is also a 5.5 cm mass in the subpleural LEFT lower lobe as well as an 11 mm LEFT lower lobe pulmonary nodule. Mediastinal and bilateral hilar lymphadenopathy is present. In the upper abdomen, at least one liver metastasis and bilateral adrenal gland masses are present.     History of Metastatic colonic adenocarcinoma, August 2017:  • pT3N2a, stage IIIB  • Pulmonary embolism, May 2019  • K-philipp mutated  • MSI stable  • PIK3CA mutated  • Liver metastasis, January 2018  Treatment summary:  • Modified FOLFOX 6  • 5-FU/leucovorin  • SBRT right upper lobe lung mass  • FOLFIRI    03/05/2021 - CT Chest:  • Previously seen right upper lobe mass has significantly enlarged currently measuring 5.0 x 5.5 x 6.9 cm and extending into and at some levels is indistinct from the right bronchovascular structures with also some narrowing of the pulmonary arteries of this region.   • There are multiple small satellite nodular opacities surrounding this mass. The previously seen left lower lobe mass has significantly enlarged currently measuring 5.0 x 4.7 x 4.7 cm.   • There is a newly developed small left lower lobe nodule measuring 7.5 mm.     03/05/2021 - CT Abdomen/Pelvis:  • Bilateral adrenal nodules with a left adrenal nodule measuring 3.9 x 1.6 cm.   • The right adrenal nodule measured 1.8 cm.   • Subcentimeter low-density liver lesions and additional subcentimeter hyperenhancing focus in the liver, change.   • Possible wall thickening of the distal colon and rectum.   • Status post right hemicolectomy.    05/12/2021 - Appointment with [facility] [place] a service of [facility]:  Assessment/Plan:  • 61 y/o WM with adenocarcinoma of cecum, diagnosed on 8/18/17, s/p right colectomy on 10/17/17, moderately  differentiated, mucinous, 4/31 LN, and 4 tumor deposits with one involving the peritoneal surface, KRAS mutated, PIK3CA mutated. Has a 1.6 cm left adrenal nodule. Stage IIIB (pT3 pN2a). He was started on adjuvant chemo with mFOLFOX6 on 11/28/17.  • PET/CT on 11/20/17 with hypermetabolic focus in the region of cecum and few tiny left lung nodules. Ct abdomen on 12/4/17 with 3 liver lesions and a 1.3 cm soft density within the fat lateral to right psoas muscle.   • Biopsy of liver lesion on 12/26/17 negative for malignancy. Biopsy of right retroperitoneal soft tissue on 12/26/17 positive for mucinous adenocarcinoma consistent with colon primary.   • MRI abdomen on 1/12/18 with 8 hepatic lesions and additional 5 lesions suspicious for hypervascular metastatic lesions. Ct chest on 1/15/18 with LLL pleural based lung nodule increasing in size and new 6 mm LLL nodule.   • Repeat right lobe liver biopsy on 1/18/18 was positive for metastatic colon cancer.  • Avastin added to mFOLFOX6 with cycle 5. Oxaliplatin reduced to 65 mg/m2 with cycle 8 due to persistent grade 1 neuropathy in toes only. Oxaliplatin discontinued with cycle 10 due to worsening neuropathy.   • Scans on 5/10/19 with 1 lung nodule growing in size from 1.3 cm to 2 cm but stable liver lesions, adrenal lesion and other lung nodule. PEt/CT on 5/24/19 with LLL lung nodue of 2.1 cm with SUV of 4.8. S/p RT to growing LLL nodule completed on 7/22/19  • Also diagnosed with PE on 5/10/19 and started on Lovenox on 5/10/19. switched to Eliquis on 8/21/19. No mucosal bleeding   • Restarted chemo with 5FU + Avastin on 1/29/20 after it was put on hold since 1/1/20 due to RUL mass RT x 15 fx completed on 1/22/20. He received cycle 53 of 5FU +/- Avastin on 6/17/20.   • Scans on 6/26/20 with progression of disease. His MSI is stable. He has a KRAS mutation.  • He was switched to FOLFIRI on 7/8/20. His cycle 2 was delayed by 35 days due to initially parameters not met but  then patient request due to his daughter wedding. He received cycle 2 on 8/12/20 and developed daily diarrhea, 4-5 watery stools daily with left sided abdominal pain. Irinotecan reduced by 20% with cycle 3 due to diarrhea and neutropenia.  • His last chemo was received Oct 2020. He was going to be treated with another cycle Nov 2020 but was sent to the ER due to hyperglycemia. He lost to f/u since then. He reports he was concerned about his diabetes. His diabetes is being managed by his PCP. He was seen again on 2/10/21 and received a cycle of FOLFIRI. He once again missed his chemo last week.   • He had scans repeat on 3/5/21 showing progression of disease. I have reviewed results with patient. Dr Downing discussed with patient that it is very important to get chemo as scheduled every 2 weeks. He did not get chemo for 4 months and only got one dose again on 2/10/21. It was explained to him that his cancer is progressing. HE has been non compliant. He reports he is here now and is willing to get chemo every 2 weeks as scheduled.   • Patient has been tolerating treatment well and will continue with FOLFIRI today (no 5FU bolus and irinotecan reduced by 20%). Patient is to continue on treatment every 2 weeks  • Patient to have repeat CT C/A/P w/ contrast before next cycle. Orders placed today  • Patient needs lifelong anticoagulation. Continue Eliquis 5 mg twice daily.   • Peripheral neuropathy: grade 1 in feet, stable, will continue to monitor.   • Patient reports that he is waiting for oncologist in [place] to open clinic to new patients to move treatment closes to his home.  • Patient is to RTC in 4 weeks with repeat labs with Dr. Downing    05/14/2021 - Last infusion of palliative FOLFIRI    Patient moved his care to [place].     05/30/2021 - Presented to the ED department at [facility] with complaints of increased short of breath over the last past month. He also had productive cough with greenish sputum. Denied  any fever or chills.     05/30/2021 - [facility]:  • No evidence of pulmonary embolus.   • Extensive metastatic disease in the chest and upper abdomen. 6.8 cm RIGHT hilar/upper lobe mass with extensive vascular and airway encasement and direct mediastinal invasion with encasement and direct invasion of the RIGHT mainstem bronchus and bronchus intermedius. RIGHT lower lobe and middle lobe tree-in-bud nodularity is present with differential including endobronchial spread of neoplasm and postobstructive pneumonia. There is also a 5.5 cm mass in the subpleural LEFT lower lobe as well as an 11 mm LEFT lower lobe pulmonary nodule. Mediastinal and bilateral hilar lymphadenopathy is present. In the upper abdomen, at least one liver metastasis and bilateral adrenal gland masses are present. Suspected tumor thrombus in the IVC adjacent to the RIGHT adrenal gland.     06/02/2021 - Inpatient with :  ASSESSMENT:  #Dyspnea-likely secondary to lung metastasis with large mass invading the right mainstem bronchus and associated pneumonia  • Agree with continue antibiotics.  • Question is if the patient is a candidate for bronchial stent placement.  •  assisting - Recommended considering palliative radiation in order to reduce the tumor burden in the right hilar area and control hemoptysis. If not candidate for radiation could possibly be candidate for stent placement at tertiary center.  • Colon cancer (liver metastasis, adrenal metastasis, lung metastasis, retroperitoneal node metastasis) K-philipp mutated, MSI stable  • Status post modified FOLFOX x10 cycles with Avastin added on for cycle 5  • Maintenance 5-FU/Avastin for 24 cycles. Recurrent disease March 2021. Currently on FOLFIRI  • CEA 33.7 on 6/1/2021  • Postobstructive pneumonia-continue current antibiotics (oral Zithromax and ceftriaxone IV)  • Improving.   PLAN:  • Continue current supportive care  • Continue antibiotics  • Appreciate pulmonology's  assistance  • Appreciate palliative care consultation  • Will arrange follow-up appointment with Dr. Barton next week.  • CT abdomen and pelvis in the outpatient setting on 2021 with arrival time at 7:30 AM.  • Follow-up in clinic with Dr. Sheridan on 2021 at 8:30 AM to continue palliative chemotherapy with FOLFIRI with a 20% dose reduction  • Okay from oncology standpoint to discharge when appropriate with others.     2021 - Discharged home with home oxygen and HH.    History obtained from  PATIENT and CHART    PAST MEDICAL HISTORY  Past Medical History:   Diagnosis Date   • Colon cancer (CMS/HCC)    • COPD (chronic obstructive pulmonary disease) (CMS/HCC)    • Diabetes mellitus (CMS/HCC)    • Hypertension         PAST SURGICAL HISTORY  Past Surgical History:   Procedure Laterality Date   • COLON SURGERY          FAMILY HISTORY  family history includes Cancer in his mother.     SOCIAL HISTORY  Social History     Tobacco Use   • Smoking status: Former Smoker     Packs/day: 1.00     Years: 44.00     Pack years: 44.00     Types: Cigarettes     Quit date: 3/7/2021     Years since quittin.2   • Smokeless tobacco: Never Used   • Tobacco comment: up to 2 ppd at times   Substance Use Topics   • Alcohol use: Not Currently     Comment: Quit 20 years ago   • Drug use: Never     ALLERGIES  Varenicline     MEDICATIONS    Current Outpatient Medications:   •  albuterol sulfate  (90 Base) MCG/ACT inhaler, Inhale 2 puffs Every 6 (Six) Hours., Disp: , Rfl:   •  apixaban (ELIQUIS) 5 MG tablet tablet, Take 1 tablet by mouth 2 (two) times a day., Disp: , Rfl:   •  BD Pen Needle [place]lele U/F 32G X 4 MM misc, USE 1 PIECE FOUR TIMES DAILY BEFORE MEALS AND NIGHTLY AS DIRECTED, Disp: , Rfl:   •  folic acid (FOLVITE) 1 MG tablet, Take 1,000 mcg by mouth Daily., Disp: , Rfl:   •  glucose blood test strip, 1 each 3 (Three) Times a Day., Disp: , Rfl:   •  hydroCHLOROthiazide (HYDRODIURIL) 25 MG tablet, Take 1 tablet by  "mouth Daily., Disp: , Rfl:   •  HYDROcodone-acetaminophen (NORCO) 5-325 MG per tablet, Take 1 tablet by mouth Every 4 (Four) Hours As Needed., Disp: , Rfl:   •  Insulin Lispro 200 UNIT/ML solution pen-injector, Inject 8 Units under the skin into the appropriate area as directed 3 (Three) Times a Day., Disp: , Rfl:   •  ipratropium-albuterol (DUO-NEB) 0.5-2.5 mg/3 ml nebulizer, Inhale 3 mL Every 4 (Four) Hours As Needed., Disp: , Rfl:   •  Lantus SoloStar 100 UNIT/ML injection pen, Inject 32 Units under the skin into the appropriate area as directed Daily., Disp: , Rfl:   •  prochlorperazine (COMPAZINE) 10 MG tablet, Take 10 mg by mouth., Disp: , Rfl:      Current outpatient and discharge medications have been reconciled for the patient.  Reviewed by: Adonis Barton III, MD    The following portions of the patient's history were reviewed and updated as appropriate: allergies, current medications, past family history, past medical history, past social history, past surgical history and problem list.    REVIEW OF SYSTEMS  Review of Systems   Constitutional: Negative.    HENT:  Negative.         Hard of hearing   Eyes: Negative.    Respiratory: Positive for hemoptysis, shortness of breath and wheezing. Negative for chest tightness and cough.    Cardiovascular: Negative.    Gastrointestinal: Negative.    Endocrine: Negative.    Genitourinary: Negative.     Musculoskeletal: Negative.    Skin: Negative.    Neurological: Negative.    Hematological: Negative.    Psychiatric/Behavioral: Negative.      I have reviewed and confirmed the accuracy of the ROS as documented by the MA/LPN/RN Adonis Barton III, MD     PHYSICAL EXAM  Vital Signs:   Vitals:    06/07/21 0922   BP: 141/94   Pulse: 104   Resp: 18   SpO2: 93%  Comment: 2 lpm   Weight: 93 kg (205 lb)   Height: 185.4 cm (73\")   PainSc: 0-No pain      Constitutional: The patient is a well-nourished male  in no acute distress.  Alert and oriented ×3.  Eyes: PERRLA.  " EOMI.  ENMT:  Ears and nose WNL.  No lesions noted in the oral cavity or oropharynx.  Lymphatics: No cervical, supraclavicular, axillary, or inguinal lymphadenopathy is palpated.  CV: Regular rate and rhythm.  No murmurs, rubs, or gallops are appreciated.  Respiratory: Lungs clear to auscultation except for RUL, diminished with slight wheezing.  GI: Abdomen soft, nontender, nondistended, with no masses palpated.  Extremities: No clubbing, cyanosis, or edema.  Neurologic:  No focal neurological deficits noted on exam.  Psychiatric: Alert and oriented x3. Normal affect, with no anxiety or depression noted.    Performance Status: ECOG (0) Fully active, able to carry on all predisease performance without restriction    Clinical Quality Measures  -Pain Documented by Standardized Tool, FPS  Anupam Buchanan reports a pain score of .  Given his pain assessment as noted, treatment options were discussed and the following options were decided upon as a follow-up plan to address the patient's pain: Denies pain, no plan given.      Pain Medications             HYDROcodone-acetaminophen (NORCO) 5-325 MG per tablet Take 1 tablet by mouth Every 4 (Four) Hours As Needed.    prochlorperazine (COMPAZINE) 10 MG tablet Take 10 mg by mouth.      -Advanced Care Planning Advance Care Planning   ACP discussion was held with the patient during this visit. Patient does not have an advance directive, information provided.    -Body Mass Index Screening and Follow-Up Plan Patient's Body mass index is 27.05 kg/m². indicating that he is overweight (BMI 25-29.9). Cancer patient    -Tobacco Use: Screening and Cessation Intervention Social History    Tobacco Use      Smoking status: Former Smoker        Packs/day: 1.00        Years: 44.00        Pack years: 44        Types: Cigarettes        Quit date: 3/7/2021        Years since quittin.2      Smokeless tobacco: Never Used      Tobacco comment: up to 2 ppd at times       ASSESSMENT AND PLAN  1.  Secondary adenocarcinoma of lung, right (CMS/HCC)    2. Primary colon cancer with metastasis to other site (CMS/HCC)    3. Shortness of breath    4. Hemoptysis    5. On antineoplastic chemotherapy      RECOMMENDATIONS: Anupam Buchanan has been referred to our office today to discuss radiotherapy recommendations for Diagnosed in August 2017 with Stage IIIB (pT3, N2a, cM0, G2) Adenocarcinoma of the Colon, mucinous in cecum.  Underwent right colectomy and LN dissection on 10/17/17, pathology revealed 4/31 LN, and 4 tumor deposits with one involving the peritoneal surface, KRAS mutated, PIK3CA mutated. Has a 1.6 cm left adrenal nodule. He was started on adjuvant chemo with FOLFOX6 on 11/28/17.  · PROGRESSION: 03/05/2021 CT Chest/abd pelvis revealed RUL lung mass 6.9 cm, extending into right bronchovascular structures with narrowing of  pulmonary arteries with multiple satellite nodular opacities surrounding mass, LLL mass has significantly enlarged 5.0 cm, new LLL nodule, 7.5 mm. Bilateral adrenal nodules with a left adrenal nodule measuring 3.9 cm, right adrenal nodule 1.8 cm. Last infusion of palliative FOLFIRI delivered 05/14/21  · 05/30/2021 - Presented to the ED department at [facility] with complaints of increased short of breath. CT of chest revealed extensive metastatic disease in  chest and upper abdomen. 6.8 cm RIGHT hilar/upper lobe mass with extensive vascular and airway encasement and direct mediastinal invasion with encasement and direct invasion of the RIGHT mainstem bronchus and bronchus intermedius. RIGHT lower lobe and middle lobe tree-in-bud nodularity is present with differential including endobronchial spread of neoplasm and postobstructive pneumonia. There is also a 5.5 cm mass in the subpleural LEFT lower lobe as well as an 11 mm LEFT lower lobe pulmonary nodule. Mediastinal and bilateral hilar lymphadenopathy is present. In the upper abdomen, at least one liver metastasis and bilateral adrenal  gland masses are present. Follows Dr Sheridan who has referred to radiation oncology for Stage IV Adenocarcinoma of the colon metastatic to lung.    We have discussed the indications and rationale of radiation therapy according to the NCCN Guidelines. I have extensively reviewed the risks, benefits and alternatives of therapy and progression of disease in spite of therapy with either local or systemic failure.  I have seen, examined and reviewed his medication list, appropriate labs and imaging studies as well as other physician notes. We discussed the goals/plans of care and answered all questions.     After consideration of the diagnostic data and evaluation of the patient, palliative radiation therapy is recommended to maximize local tumor control of the RUL lung nodule. I anticipate a dose of 3250 cGy over 13 fractions, final course pending completion of planning.    The patient and his family verbalize understanding of this discussion, voice no further questions and wish to proceed with recommendations.    We will simulate treatment fields today to begin the treatment planning.     Continue ongoing management per primary care physician and other specialists. Thank you for allowing me to assist in this patients care.    Patient Instructions   1)  Plan on 13 radiation treatments to spot in right upper lung.  This will not get rid of your cancer but only slow it down.    2)  I do not believe your cancer can be cured unless a miracle by God, your doctors can only slow it down.  3)  We will call you when to start radiation in 1-2 weeks.    Time Spent: I spent 50 minutes caring for Anupam on this date of service. This time includes time spent by me in the following activities: preparing for the visit, reviewing tests, obtaining and/or reviewing a separately obtained history, performing a medically appropriate examination and/or evaluation, counseling and educating the patient/family/caregiver, ordering medications,  tests, or procedures, referring and communicating with other health care professionals and documenting information in the medical record.   Adonis Barton III, MD   06/07/2021

## 2021-06-07 PROBLEM — C78.01: Status: ACTIVE | Noted: 2021-01-01

## 2021-06-07 PROBLEM — R06.02 SHORTNESS OF BREATH: Status: ACTIVE | Noted: 2021-01-01

## 2021-06-07 PROBLEM — R04.2 HEMOPTYSIS: Status: ACTIVE | Noted: 2021-01-01

## 2021-06-07 NOTE — PATIENT INSTRUCTIONS
1)  Plan on 13 radiation treatments to spot in right upper lung.  This will not get rid of your cancer but only slow it down.    2)  I do not believe your cancer can be cured unless a miracle by God, your doctors can only slow it down.  3)  We will call you when to start radiation in 1-2 weeks.

## 2021-06-08 NOTE — PROGRESS NOTES
MEDICAL ONCOLOGY PROGRESS NOTE    Pt Name: Selvin Aguilar  MRN: [id number]  YOB: 1960  Date of evaluation: 6/9/2021    HISTORY OF PRESENT ILLNESS:    Diagnosis  · Metastatic colonic adenocarcinoma, August 2017  · pT3N2a, stage IIIB  · Pulmonary embolism, May 2019  · K-michael mutated  · MSI stable  · PIK3CA mutated  · Liver metastasis, January 2018     Treatment summary  · Modified FOLFOX 6  · 5-FU/leucovorin/Avastin time 32 cycles (progressive disease and developed pulmonary bleeds)  · SBRT right upper lobe lung metastasis  · FOLFIRI        Target lesions  · Liver metastasis  · Right upper lobe/left lower lobe lung metastasis  · Adrenal metastasis bilaterally     Cancer History  Kate Knight was first seen by Dr. Omid Ponce on 5/31/2021. Víctor Aguirre is a pleasant 64years old male who is a patient of Dr. Shikha Santiago oncologist at [address] [place] [address] in [facility].    Patient was initially diagnosed in July 2017 when he was hospitalized for colitis.  CT abdomen showed thickening of the lower right colon.  A colonoscopy was performed in August 2017 that showed a large fungating mass in the cecum and proximal ascending colon.  Biopsy was consistent with invasive carcinoma with mucinous features.  Staging CT scans showed a left lower lobe micronodule measuring 3 mm and a 1.6 cm left adrenal nodule in September 2017.  The patient underwent a laparoscopic open right colectomy, partial omentectomy and partial peritonectomy on 10/17/2017.  Pathology consistent with differentiated mucinous adenocarcinoma involving the cecal pouch, ileocecal valve and extending intraluminally into the terminal ileus.  For tumor deposits present.  Final pathologic staging consistent with K-michael mutated, for positive lymph nodes [id number], stage IIIB colon cancer.  He received adjuvant modified FOLFOX 6 started November 2017.  Unfortunately biopsy-proven metastatic disease to the liver documented in #3  · 5/30/2021the patient presented to the ED department at [facility] with complaints of increased short of breath over the last past month.  He also had productive cough with greenish sputum.  Denied any fever or chills. · 5/30/2001CT pulmonary protocol showed no evidence of pulmonary embolism but presence of lung metastasis. Right upper lobe lung metallic mesh 6.8 x 4.8 cm with extensive airway and vascular encasement. Direct extension into the mediastinum with partial encasement and apparently direct invasion of the right mainstem bronchus and bronchus intermedius. Intraluminal soft tissue and focal severe narrowing. Tree-in-bud nodularity throughout the right lower and right middle lobe is present. Left lower lobe 5.5 x 5 cm mass. Enlarged mediastinal and bilateral hilar lymph nodes.   · 6/1/2021CEA 33.7  · 6/9/2021cycle #4 of FOLFIRI      Past Medical History:    Past Medical History:   Diagnosis Date    Cancer ([facility]     colon    Diabetes mellitus ([facility]     Essential hypertension 5/6/2019    Hyperlipidemia 7/26/2017    Last Assessment & Plan:  Formatting of this note might be different from the original. On Lipitor    Palliative care patient 06/01/2021       Past Surgical History:    Past Surgical History:   Procedure Laterality Date    ABDOMEN SURGERY      COLONOSCOPY      ENDOSCOPY, COLON, DIAGNOSTIC      TUNNELED VENOUS PORT PLACEMENT         Social History:    Marital status: , 4 children  Smoking status: former smoker, quit 6 months ago, 1ppd for 40 years  ETOH status: not currently  Resides: [place], [place]    Family History:   Family History   Problem Relation Age of Onset    Heart Attack Father     Heart Attack Maternal Grandfather     Heart Attack Paternal Grandfather        Current Hospital Medications:    Current Outpatient Medications   Medication Sig Dispense Refill    ipratropium-albuterol (DUONEB) 0.5-2.5 (3) MG/3ML SOLN nebulizer solution Inhale 3 mLs into the lungs every 4 hours (while awake) 714 mL 0    folic acid (FOLVITE) 1 MG tablet Take 1 tablet by mouth daily 30 tablet 0    Nebulizers (COMPRESSOR/NEBULIZER) MISC 1 vial by Does not apply route 4 times daily 1 each 0    prochlorperazine (COMPAZINE) 10 MG tablet Take 10 mg by mouth every 6 hours as needed      insulin glargine (LANTUS;BASAGLAR) 100 UNIT/ML injection pen Inject 32 Units into the skin daily      insulin lispro (HUMALOG KWIKPEN U-200) 200 UNIT/ML SOPN pen Inject 8 Units into the skin 3 times daily as needed      albuterol sulfate HFA (VENTOLIN HFA) 108 (90 Base) MCG/ACT inhaler Inhale 2 puffs into the lungs every 6 hours as needed for Wheezing      hydroCHLOROthiazide (HYDRODIURIL) 25 MG tablet Take 25 mg by mouth daily      apixaban (ELIQUIS) 5 MG TABS tablet Take by mouth 2 times daily       No current facility-administered medications for this visit. Facility-Administered Medications Ordered in Other Visits   Medication Dose Route Frequency Provider Last Rate Last Admin    dextrose 5 % solution   Intravenous Once Javier Youssef MD        fluorouracil (ADRUCIL) 4,125 mg in sodium chloride 0.9 % 250 mL chemo infusion  1,900 mg/m2 (Treatment Plan Recorded) Intravenous Over 46 hours Javier Youssef MD 5.4 mL/hr at 06/09/21 1341 4,125 mg at 06/09/21 1341       Allergies:    Allergies   Allergen Reactions    Varenicline      Other reaction(s): Anger and Agression   -          Subjective   REVIEW OF SYSTEMS:   CONSTITUTIONAL: no fever, no night sweats, fatigue;  HEENT: no blurring of vision, no double vision, no hearing difficulty, no tinnitus, no ulceration, no dysplasia, no epistaxis;  LUNGS: no cough, no hemoptysis, no wheeze,  shortness of breath;  CARDIOVASCULAR: no palpitation, no chest pain, shortness of breath;  GI: no abdominal pain, no nausea, no vomiting, no diarrhea, no constipation;  ALVIN: no dysuria, no hematuria, no frequency or urgency, no nephrolithiasis;  MUSCULOSKELETAL: no joint pain, no swelling, no stiffness;  ENDOCRINE: no polyuria, no polydipsia, no cold or heat intolerance;  HEMATOLOGY: no easy bruising or bleeding, no history of clotting disorder;  DERMATOLOGY: no skin rash, no eczema, no pruritus;  PSYCHIATRY: no depression, no anxiety, no panic attacks, no suicidal ideation, no homicidal ideation;  NEUROLOGY: no syncope, no seizures, no numbness or tingling of hands, no numbness or tingling of feet, no paresis;    Objective   /86 (Site: Left Upper Arm)   Pulse 111   Temp 98.3 °F (36.8 °C) (Oral)   Resp 24   Ht 6' 1\" (1.854 m)   Wt 197 lb (89.4 kg)   BMI 25.99 kg/m²     PHYSICAL EXAM:  CONSTITUTIONAL: Alert, appropriate, no acute distress  EYES: Non icteric, EOM intact, pupils equal round   ENT: Mucus membranes moist, no oral pharyngeal lesions, external inspection of ears and nose are normal  NECK: Supple, no masses. No palpable thyroid mass  CHEST/LUNGS: CTA bilaterally, normal respiratory effort   CARDIOVASCULAR: RRR, no murmurs. No lower extremity edema  ABDOMEN: soft non-tender, active bowel sounds, no HSM. No palpable masses  EXTREMITIES: warm, full ROM in all 4 extremities, no focal weakness. SKIN: warm, dry with no rashes or lesions  LYMPH: No cervical, clavicular, axillary, or inguinal lymphadenopathy  NEUROLOGIC: follows commands, non focal   PSYCH: mood and affect appropriate.   Alert and oriented to time, place, person      LABORATORY RESULTS REVIEWED/ANALYZED BY ME:  Lab Results   Component Value Date    WBC 12.27 (H) 06/09/2021    HGB 18.8 (HH) 06/09/2021    HCT 57.0 (HH) 06/09/2021    MCV 87.0 06/09/2021     06/09/2021     Lab Results   Component Value Date    NEUTROABS 10.22 (H) 06/09/2021     Lab Results   Component Value Date     06/09/2021    K 3.5 06/09/2021    CL 96 (L) 06/09/2021    CO2 29 06/09/2021    BUN 14 06/09/2021    CREATININE 1.0 06/09/2021    GLUCOSE 202 (H) 06/09/2021    CALCIUM 8.6 06/09/2021    PROT 7.0 06/09/2021    LABALBU 3.8 06/09/2021    BILITOT 1.1 06/09/2021    ALKPHOS 194 (H) 06/09/2021    AST 28 06/09/2021    ALT 18 (L) 06/09/2021    LABGLOM >60 06/09/2021    GFRAA >59 06/03/2021    GLOB 3.2 06/09/2021         RADIOLOGY STUDIES REVIEWED BY ME:  None      ASSESSMENT:    Orders Placed This Encounter   Procedures    CT ABDOMEN PELVIS W IV CONTRAST Additional Contrast? Oral     Standing Status:   Future     Standing Expiration Date:   7/9/2021     Order Specific Question:   Additional Contrast?     Answer:   Oral    CBC Auto Differential     Standing Status:   Future     Number of Occurrences:   1     Standing Expiration Date:   6/9/2022    Comprehensive Metabolic Panel     Standing Status:   Future     Number of Occurrences:   1     Standing Expiration Date:   6/9/2022      Brigid Gramajo was seen today for chemotherapy. Diagnoses and all orders for this visit:    Primary colon cancer with metastasis to other site ([facility]  -     CBC Auto Differential; Future  -     Comprehensive Metabolic Panel; Future  -     Comprehensive Metabolic Panel  -     CBC Auto Differential  -     CT ABDOMEN PELVIS W IV CONTRAST Additional Contrast? Oral; Future    Chemotherapy management, encounter for    Adverse effect of chemotherapy, subsequent encounter    Care plan discussed with patient    Malignant neoplasm metastatic to lung, unspecified laterality ([facility]       #Dyspnealikely secondary to lung metastasis with large mass invading the right mainstem bronchus and associated pneumonia  Patient currently being seen by Dr. Jae Last for repeat radiation for the right upper lobe mass    #Bilateral lung metastasis  Right upper lobe lung lesion. The patient has received RT in the past.  Discussed with Dr. Jae Last.   He will reirradiate again.     #Colon cancer (liver metastasis, adrenal metastasis, lung metastasis, retroperitoneal node metastasis) K-michael mutated, MSI stable  Status post modified FOLFOX x10 cycles with Avastin added on for cycle 5  Maintenance 5-FU/Avastin for 24 cycles.  Recurrent disease March 2021. Currently on FOLFIRI  CEA 33.7 on 6/1/2021 6/9/2021cycle #4 FOLFIRI. Avastinnot a good option due to intraluminal growth of tumor in the airways. We will repeat CT scans after 6 additional cycles of FOLFIRI. PLAN:  CT abdomen and pelvis next available  Continue Cycle #4 palliative chemotherapy with FOLFIRI with a 20% dose reduction today and every 2 weeks  RTC 4 weeks with MD  Discussed with radiation oncology  Repeat CEA during next visit    I have seen, examined and reviewed this patient medication list, appropriate labs and imaging studies. I reviewed relevant medical records and others physicians notes. I discussed the plans of care with the patient. I answered all the questions to the patients satisfaction. I have also reviewed the chief complaint (CC) and part of the history (History of Present Illness (HPI), Past Family Social History [facility]), or Review of Systems (ROS) and made changes when appropriated.        (Please note that portions of this note were completed with a voice recognition program. Efforts were made to edit the dictations but occasionally words are mis-transcribed.)        Denys Weldon MD    06/09/21  5:19 PM

## 2021-06-09 ENCOUNTER — OFFICE VISIT (OUTPATIENT)
Dept: HEMATOLOGY | Age: 61
End: 2021-06-09
Payer: MEDICARE

## 2021-06-09 ENCOUNTER — HOSPITAL ENCOUNTER (OUTPATIENT)
Dept: INFUSION THERAPY | Age: 61
Discharge: HOME | End: 2021-06-09
Payer: MEDICARE

## 2021-06-09 VITALS
RESPIRATION RATE: 24 BRPM | HEART RATE: 111 BPM | TEMPERATURE: 98.3 F | DIASTOLIC BLOOD PRESSURE: 86 MMHG | BODY MASS INDEX: 26.11 KG/M2 | WEIGHT: 197 LBS | HEIGHT: 73 IN | SYSTOLIC BLOOD PRESSURE: 117 MMHG

## 2021-06-09 VITALS — OXYGEN SATURATION: 92 %

## 2021-06-09 DIAGNOSIS — C78.00 MALIGNANT NEOPLASM METASTATIC TO LUNG, UNSPECIFIED LATERALITY (HCC): ICD-10-CM

## 2021-06-09 DIAGNOSIS — T45.1X5D ADVERSE EFFECT OF CHEMOTHERAPY, SUBSEQUENT ENCOUNTER: ICD-10-CM

## 2021-06-09 DIAGNOSIS — Z51.11 CHEMOTHERAPY MANAGEMENT, ENCOUNTER FOR: ICD-10-CM

## 2021-06-09 DIAGNOSIS — Z71.89 CARE PLAN DISCUSSED WITH PATIENT: ICD-10-CM

## 2021-06-09 DIAGNOSIS — C18.9 PRIMARY COLON CANCER WITH METASTASIS TO OTHER SITE (HCC): Primary | ICD-10-CM

## 2021-06-09 LAB
ALBUMIN SERPL-MCNC: 3.8 G/DL (ref 3.5–5.2)
ALP BLD-CCNC: 194 U/L (ref 40–130)
ALT SERPL-CCNC: 18 U/L (ref 21–72)
ANION GAP SERPL CALCULATED.3IONS-SCNC: 12 MMOL/L (ref 7–19)
AST SERPL-CCNC: 28 U/L (ref 17–59)
BASOPHILS ABSOLUTE: 0.05 K/UL (ref 0.01–0.08)
BASOPHILS RELATIVE PERCENT: 0.4 % (ref 0.1–1.2)
BILIRUB SERPL-MCNC: 1.1 MG/DL (ref 0.2–1.3)
BUN BLDV-MCNC: 14 MG/DL (ref 9–20)
CALCIUM SERPL-MCNC: 8.6 MG/DL (ref 8.4–10.2)
CHLORIDE BLD-SCNC: 96 MMOL/L (ref 98–111)
CO2: 29 MMOL/L (ref 22–29)
CREAT SERPL-MCNC: 1 MG/DL (ref 0.6–1.2)
EOSINOPHILS ABSOLUTE: 0.12 K/UL (ref 0.04–0.54)
EOSINOPHILS RELATIVE PERCENT: 1 % (ref 0.7–7)
GFR NON-AFRICAN AMERICAN: >60
GLOBULIN: 3.2 G/DL
GLUCOSE BLD-MCNC: 202 MG/DL (ref 74–106)
HCT VFR BLD CALC: 57 % (ref 40.1–51)
HEMOGLOBIN: 18.8 G/DL (ref 13.7–17.5)
LYMPHOCYTES ABSOLUTE: 0.91 K/UL (ref 1.18–3.74)
LYMPHOCYTES RELATIVE PERCENT: 7.4 % (ref 19.3–53.1)
MCH RBC QN AUTO: 28.7 PG (ref 25.7–32.2)
MCHC RBC AUTO-ENTMCNC: 33 G/DL (ref 32.3–36.5)
MCV RBC AUTO: 87 FL (ref 79–92.2)
MONOCYTES ABSOLUTE: 0.97 K/UL (ref 0.24–0.82)
MONOCYTES RELATIVE PERCENT: 7.9 % (ref 4.7–12.5)
NEUTROPHILS ABSOLUTE: 10.22 K/UL (ref 1.56–6.13)
NEUTROPHILS RELATIVE PERCENT: 83.3 % (ref 34–71.1)
PDW BLD-RTO: 17.4 % (ref 11.6–14.4)
PLATELET # BLD: 331 K/UL (ref 163–337)
PMV BLD AUTO: 9.2 FL (ref 7.4–10.4)
POTASSIUM SERPL-SCNC: 3.5 MMOL/L (ref 3.5–5.1)
RBC # BLD: 6.55 M/UL (ref 4.63–6.08)
SODIUM BLD-SCNC: 137 MMOL/L (ref 137–145)
TOTAL PROTEIN: 7 G/DL (ref 6.3–8.2)
WBC # BLD: 12.27 K/UL (ref 4.23–9.07)

## 2021-06-09 PROCEDURE — G8419 CALC BMI OUT NRM PARAM NOF/U: HCPCS | Performed by: INTERNAL MEDICINE

## 2021-06-09 PROCEDURE — 96375 TX/PRO/DX INJ NEW DRUG ADDON: CPT

## 2021-06-09 PROCEDURE — 2580000003 HC RX 258: Performed by: INTERNAL MEDICINE

## 2021-06-09 PROCEDURE — 96413 CHEMO IV INFUSION 1 HR: CPT

## 2021-06-09 PROCEDURE — 96368 THER/DIAG CONCURRENT INF: CPT

## 2021-06-09 PROCEDURE — 96415 CHEMO IV INFUSION ADDL HR: CPT

## 2021-06-09 PROCEDURE — G8428 CUR MEDS NOT DOCUMENT: HCPCS | Performed by: INTERNAL MEDICINE

## 2021-06-09 PROCEDURE — 96376 TX/PRO/DX INJ SAME DRUG ADON: CPT

## 2021-06-09 PROCEDURE — 99214 OFFICE O/P EST MOD 30 MIN: CPT | Performed by: INTERNAL MEDICINE

## 2021-06-09 PROCEDURE — 96367 TX/PROPH/DG ADDL SEQ IV INF: CPT

## 2021-06-09 PROCEDURE — 96417 CHEMO IV INFUS EACH ADDL SEQ: CPT

## 2021-06-09 PROCEDURE — 3017F COLORECTAL CA SCREEN DOC REV: CPT | Performed by: INTERNAL MEDICINE

## 2021-06-09 PROCEDURE — 1111F DSCHRG MED/CURRENT MED MERGE: CPT | Performed by: INTERNAL MEDICINE

## 2021-06-09 PROCEDURE — 96411 CHEMO IV PUSH ADDL DRUG: CPT

## 2021-06-09 PROCEDURE — 1036F TOBACCO NON-USER: CPT | Performed by: INTERNAL MEDICINE

## 2021-06-09 PROCEDURE — 6360000002 HC RX W HCPCS: Performed by: INTERNAL MEDICINE

## 2021-06-09 PROCEDURE — G0498 CHEMO EXTEND IV INFUS W/PUMP: HCPCS

## 2021-06-09 RX ORDER — PALONOSETRON 0.05 MG/ML
0.25 INJECTION, SOLUTION INTRAVENOUS ONCE
Status: COMPLETED | OUTPATIENT
Start: 2021-06-09 | End: 2021-06-09

## 2021-06-09 RX ORDER — PALONOSETRON 0.05 MG/ML
0.25 INJECTION, SOLUTION INTRAVENOUS ONCE
Status: CANCELLED | OUTPATIENT
Start: 2021-06-09

## 2021-06-09 RX ORDER — SODIUM CHLORIDE 9 MG/ML
25 INJECTION, SOLUTION INTRAVENOUS PRN
Status: CANCELLED | OUTPATIENT
Start: 2021-06-09

## 2021-06-09 RX ORDER — ATROPINE SULFATE 0.4 MG/ML
0.5 AMPUL (ML) INJECTION ONCE
Status: DISCONTINUED | OUTPATIENT
Start: 2021-06-09 | End: 2021-06-09

## 2021-06-09 RX ORDER — ATROPINE SULFATE 0.4 MG/ML
0.5 AMPUL (ML) INJECTION ONCE
Status: CANCELLED
Start: 2021-06-09 | End: 2021-06-09

## 2021-06-09 RX ORDER — METHYLPREDNISOLONE SODIUM SUCCINATE 125 MG/2ML
125 INJECTION, POWDER, LYOPHILIZED, FOR SOLUTION INTRAMUSCULAR; INTRAVENOUS ONCE
Status: CANCELLED | OUTPATIENT
Start: 2021-06-09 | End: 2021-06-09

## 2021-06-09 RX ORDER — DEXAMETHASONE SODIUM PHOSPHATE 10 MG/ML
10 INJECTION, SOLUTION INTRAMUSCULAR; INTRAVENOUS ONCE
Status: COMPLETED | OUTPATIENT
Start: 2021-06-09 | End: 2021-06-09

## 2021-06-09 RX ORDER — HEPARIN SODIUM (PORCINE) LOCK FLUSH IV SOLN 100 UNIT/ML 100 UNIT/ML
500 SOLUTION INTRAVENOUS PRN
Status: CANCELLED | OUTPATIENT
Start: 2021-06-09

## 2021-06-09 RX ORDER — SODIUM CHLORIDE 0.9 % (FLUSH) 0.9 %
5-40 SYRINGE (ML) INJECTION PRN
Status: CANCELLED | OUTPATIENT
Start: 2021-06-09

## 2021-06-09 RX ORDER — DEXTROSE MONOHYDRATE 50 MG/ML
INJECTION, SOLUTION INTRAVENOUS ONCE
Status: DISCONTINUED | OUTPATIENT
Start: 2021-06-09 | End: 2021-06-11 | Stop reason: HOSPADM

## 2021-06-09 RX ORDER — FLUOROURACIL 50 MG/ML
320 INJECTION, SOLUTION INTRAVENOUS ONCE
Status: COMPLETED | OUTPATIENT
Start: 2021-06-09 | End: 2021-06-09

## 2021-06-09 RX ORDER — EPINEPHRINE 1 MG/ML
0.3 INJECTION, SOLUTION, CONCENTRATE INTRAVENOUS PRN
Status: CANCELLED | OUTPATIENT
Start: 2021-06-09

## 2021-06-09 RX ORDER — DEXTROSE MONOHYDRATE 50 MG/ML
INJECTION, SOLUTION INTRAVENOUS ONCE
Status: CANCELLED | OUTPATIENT
Start: 2021-06-09 | End: 2021-06-09

## 2021-06-09 RX ORDER — ATROPINE SULFATE 0.4 MG/ML
0.5 AMPUL (ML) INJECTION ONCE
Status: CANCELLED | OUTPATIENT
Start: 2021-06-09 | End: 2021-06-09

## 2021-06-09 RX ORDER — FLUOROURACIL 50 MG/ML
320 INJECTION, SOLUTION INTRAVENOUS ONCE
Status: CANCELLED | OUTPATIENT
Start: 2021-06-09

## 2021-06-09 RX ORDER — ATROPINE SULFATE 1 MG/ML
0.5 INJECTION, SOLUTION INTRAMUSCULAR; INTRAVENOUS; SUBCUTANEOUS ONCE
Status: COMPLETED | OUTPATIENT
Start: 2021-06-09 | End: 2021-06-09

## 2021-06-09 RX ORDER — SODIUM CHLORIDE 9 MG/ML
INJECTION, SOLUTION INTRAVENOUS CONTINUOUS
Status: CANCELLED | OUTPATIENT
Start: 2021-06-09

## 2021-06-09 RX ORDER — DIPHENHYDRAMINE HYDROCHLORIDE 50 MG/ML
50 INJECTION INTRAMUSCULAR; INTRAVENOUS ONCE
Status: CANCELLED | OUTPATIENT
Start: 2021-06-09 | End: 2021-06-09

## 2021-06-09 RX ADMIN — FLUOROURACIL 4125 MG: 50 INJECTION, SOLUTION INTRAVENOUS at 13:41

## 2021-06-09 RX ADMIN — DEXAMETHASONE SODIUM PHOSPHATE 10 MG: 10 INJECTION, SOLUTION INTRAMUSCULAR; INTRAVENOUS at 11:33

## 2021-06-09 RX ADMIN — PALONOSETRON 0.25 MG: 0.05 INJECTION, SOLUTION INTRAVENOUS at 11:33

## 2021-06-09 RX ADMIN — FLUOROURACIL 700 MG: 50 INJECTION, SOLUTION INTRAVENOUS at 13:38

## 2021-06-09 RX ADMIN — DEXTROSE MONOHYDRATE 300 MG: 50 INJECTION, SOLUTION INTRAVENOUS at 11:51

## 2021-06-09 RX ADMIN — LEUCOVORIN CALCIUM 700 MG: 350 INJECTION, POWDER, LYOPHILIZED, FOR SOLUTION INTRAMUSCULAR; INTRAVENOUS at 11:51

## 2021-06-09 RX ADMIN — ATROPINE SULFATE 0.5 MG: 1 INJECTION, SOLUTION INTRAMUSCULAR; INTRAVENOUS; SUBCUTANEOUS at 13:36

## 2021-06-09 RX ADMIN — ATROPINE SULFATE 0.5 MG: 1 INJECTION, SOLUTION INTRAMUSCULAR; INTRAVENOUS; SUBCUTANEOUS at 11:50

## 2021-06-09 ASSESSMENT — PAIN SCALES - GENERAL: PAINLEVEL_OUTOF10: 0

## 2021-06-11 ENCOUNTER — HOSPITAL ENCOUNTER (OUTPATIENT)
Dept: INFUSION THERAPY | Age: 61
Discharge: HOME | End: 2021-06-11
Payer: MEDICARE

## 2021-06-11 DIAGNOSIS — C18.9 PRIMARY COLON CANCER WITH METASTASIS TO OTHER SITE (HCC): Primary | ICD-10-CM

## 2021-06-11 PROCEDURE — 6360000002 HC RX W HCPCS: Performed by: INTERNAL MEDICINE

## 2021-06-11 PROCEDURE — 2580000003 HC RX 258: Performed by: INTERNAL MEDICINE

## 2021-06-11 PROCEDURE — 96523 IRRIG DRUG DELIVERY DEVICE: CPT

## 2021-06-11 RX ORDER — ONDANSETRON 4 MG/1
8 TABLET, FILM COATED ORAL EVERY 8 HOURS PRN
Qty: 30 TABLET | Refills: 3 | Status: ON HOLD | OUTPATIENT
Start: 2021-06-11 | End: 2021-09-20 | Stop reason: HOSPADM

## 2021-06-11 RX ORDER — SODIUM CHLORIDE 0.9 % (FLUSH) 0.9 %
5-40 SYRINGE (ML) INJECTION PRN
Status: DISCONTINUED | OUTPATIENT
Start: 2021-06-11 | End: 2021-06-12 | Stop reason: HOSPADM

## 2021-06-11 RX ORDER — SODIUM CHLORIDE 0.9 % (FLUSH) 0.9 %
5-40 SYRINGE (ML) INJECTION PRN
Status: CANCELLED | OUTPATIENT
Start: 2021-06-11

## 2021-06-11 RX ORDER — SODIUM CHLORIDE 9 MG/ML
25 INJECTION, SOLUTION INTRAVENOUS PRN
Status: CANCELLED | OUTPATIENT
Start: 2021-06-11

## 2021-06-11 RX ORDER — HEPARIN SODIUM (PORCINE) LOCK FLUSH IV SOLN 100 UNIT/ML 100 UNIT/ML
500 SOLUTION INTRAVENOUS PRN
Status: DISCONTINUED | OUTPATIENT
Start: 2021-06-11 | End: 2021-06-12 | Stop reason: HOSPADM

## 2021-06-11 RX ORDER — HEPARIN SODIUM (PORCINE) LOCK FLUSH IV SOLN 100 UNIT/ML 100 UNIT/ML
500 SOLUTION INTRAVENOUS PRN
Status: CANCELLED | OUTPATIENT
Start: 2021-06-11

## 2021-06-11 RX ADMIN — SODIUM CHLORIDE, PRESERVATIVE FREE 10 ML: 5 INJECTION INTRAVENOUS at 13:53

## 2021-06-11 RX ADMIN — HEPARIN 500 UNITS: 100 SYRINGE at 13:53

## 2021-06-15 ENCOUNTER — TELEPHONE (OUTPATIENT)
Dept: PALLATIVE CARE | Age: 61
End: 2021-06-15

## 2021-06-15 NOTE — TELEPHONE ENCOUNTER
I talked to Mr. Shalini Quiroz about the Supportive Care program. He was not interested at this time. I will be mailing him a program brochure and he will call if he is interested.

## 2021-06-22 RX ORDER — FLUOROURACIL 50 MG/ML
320 INJECTION, SOLUTION INTRAVENOUS ONCE
Status: CANCELLED | OUTPATIENT
Start: 2021-06-29

## 2021-06-22 RX ORDER — SODIUM CHLORIDE 0.9 % (FLUSH) 0.9 %
5-40 SYRINGE (ML) INJECTION PRN
Status: CANCELLED | OUTPATIENT
Start: 2021-06-29

## 2021-06-22 RX ORDER — METHYLPREDNISOLONE SODIUM SUCCINATE 125 MG/2ML
125 INJECTION, POWDER, LYOPHILIZED, FOR SOLUTION INTRAMUSCULAR; INTRAVENOUS ONCE
Status: CANCELLED | OUTPATIENT
Start: 2021-06-29 | End: 2021-06-23

## 2021-06-22 RX ORDER — PALONOSETRON 0.05 MG/ML
0.25 INJECTION, SOLUTION INTRAVENOUS ONCE
Status: CANCELLED | OUTPATIENT
Start: 2021-06-29

## 2021-06-22 RX ORDER — DEXTROSE MONOHYDRATE 50 MG/ML
INJECTION, SOLUTION INTRAVENOUS ONCE
Status: CANCELLED | OUTPATIENT
Start: 2021-06-29 | End: 2021-06-23

## 2021-06-22 RX ORDER — EPINEPHRINE 1 MG/ML
0.3 INJECTION, SOLUTION, CONCENTRATE INTRAVENOUS PRN
Status: CANCELLED | OUTPATIENT
Start: 2021-06-29

## 2021-06-22 RX ORDER — SODIUM CHLORIDE 9 MG/ML
INJECTION, SOLUTION INTRAVENOUS CONTINUOUS
Status: CANCELLED | OUTPATIENT
Start: 2021-06-29

## 2021-06-22 RX ORDER — ATROPINE SULFATE 0.4 MG/ML
0.5 AMPUL (ML) INJECTION ONCE
Status: CANCELLED | OUTPATIENT
Start: 2021-06-29 | End: 2021-06-23

## 2021-06-22 RX ORDER — DIPHENHYDRAMINE HYDROCHLORIDE 50 MG/ML
50 INJECTION INTRAMUSCULAR; INTRAVENOUS ONCE
Status: CANCELLED | OUTPATIENT
Start: 2021-06-29 | End: 2021-06-23

## 2021-06-22 RX ORDER — ATROPINE SULFATE 0.4 MG/ML
0.5 AMPUL (ML) INJECTION ONCE
Status: CANCELLED
Start: 2021-06-29 | End: 2021-06-23

## 2021-06-22 RX ORDER — SODIUM CHLORIDE 9 MG/ML
25 INJECTION, SOLUTION INTRAVENOUS PRN
Status: CANCELLED | OUTPATIENT
Start: 2021-06-29

## 2021-06-22 RX ORDER — HEPARIN SODIUM (PORCINE) LOCK FLUSH IV SOLN 100 UNIT/ML 100 UNIT/ML
500 SOLUTION INTRAVENOUS PRN
Status: CANCELLED | OUTPATIENT
Start: 2021-06-29

## 2021-06-23 ENCOUNTER — HOSPITAL ENCOUNTER (OUTPATIENT)
Dept: INFUSION THERAPY | Age: 61
End: 2021-06-23
Payer: MEDICARE

## 2021-06-23 DIAGNOSIS — C34.90 MALIGNANT NEOPLASM OF LUNG, UNSPECIFIED LATERALITY, UNSPECIFIED PART OF LUNG (HCC): ICD-10-CM

## 2021-06-23 DIAGNOSIS — C18.9 PRIMARY COLON CANCER WITH METASTASIS TO OTHER SITE (HCC): ICD-10-CM

## 2021-06-23 DIAGNOSIS — G89.3 CANCER ASSOCIATED PAIN: Primary | ICD-10-CM

## 2021-06-23 DIAGNOSIS — Z51.5 PALLIATIVE CARE PATIENT: ICD-10-CM

## 2021-06-23 RX ORDER — HYDROCODONE BITARTRATE AND ACETAMINOPHEN 5; 325 MG/1; MG/1
1 TABLET ORAL EVERY 6 HOURS PRN
Qty: 120 TABLET | Refills: 0 | Status: SHIPPED | OUTPATIENT
Start: 2021-06-23 | End: 2021-07-23

## 2021-06-24 NOTE — PROGRESS NOTES
NIGEL met with Mr. Buchanan who started palliative radiation for RUL lung nodule. SW introduced self and explained role and source of support. He is a 60 year old male and he lives with his daughter, son-in-law, and granddaughter. His daughter is his caregiver and she will be driving him to treatment. He does not have any financial concerns but this writer did speak to him about Medicaid and the financial counselors due to a supplemental insurance not listed. Mr. Buchanan states his pain medication does help to control his pain. He denies needing any assistance. Mr. Buchanan will contact this writer if assistance is needed in the future.

## 2021-06-28 DIAGNOSIS — C18.9 PRIMARY COLON CANCER WITH METASTASIS TO OTHER SITE (HCC): Primary | ICD-10-CM

## 2021-06-29 ENCOUNTER — HOSPITAL ENCOUNTER (OUTPATIENT)
Dept: INFUSION THERAPY | Age: 61
End: 2021-06-29
Payer: MEDICARE

## 2021-06-29 NOTE — PROGRESS NOTES
PT did not arrive for scheduled chemo infusion today. This RN called PT, no answer so this RN call PT's daughter Manuel Hightower that is listed on contact list.  PT's daughter states that the PT has decided to not receive chemotherapy concurrently with radiation. She states that he wants to start chemo after radiation cycles are completed. PT has appointment on July 7th with Dr. Shanon Giron and this RN reminded daughter of the importance of the PT keeping this appointment so they could discuss this. She verbalizes understanding with no further questions at this time.

## 2021-09-17 ENCOUNTER — APPOINTMENT (OUTPATIENT)
Dept: CT IMAGING | Age: 61
DRG: 637 | End: 2021-09-17
Payer: MEDICARE

## 2021-09-17 ENCOUNTER — HOSPITAL ENCOUNTER (INPATIENT)
Age: 61
LOS: 2 days | Discharge: HOME HEALTH | DRG: 637 | End: 2021-09-21
Attending: EMERGENCY MEDICINE
Payer: MEDICARE

## 2021-09-17 ENCOUNTER — APPOINTMENT (OUTPATIENT)
Dept: GENERAL RADIOLOGY | Age: 61
DRG: 637 | End: 2021-09-17
Payer: MEDICARE

## 2021-09-17 DIAGNOSIS — R62.7 FAILURE TO THRIVE IN ADULT: ICD-10-CM

## 2021-09-17 DIAGNOSIS — R53.1 GENERALIZED WEAKNESS: ICD-10-CM

## 2021-09-17 DIAGNOSIS — E16.2 HYPOGLYCEMIA: Primary | ICD-10-CM

## 2021-09-17 DIAGNOSIS — Z85.038 HISTORY OF COLON CANCER: ICD-10-CM

## 2021-09-17 DIAGNOSIS — Z85.118 HISTORY OF LUNG CANCER: ICD-10-CM

## 2021-09-17 DIAGNOSIS — R55 SYNCOPE, UNSPECIFIED SYNCOPE TYPE: ICD-10-CM

## 2021-09-17 DIAGNOSIS — E87.6 HYPOKALEMIA: ICD-10-CM

## 2021-09-17 PROBLEM — K91.2 HYPOGLYCEMIA AFTER GI (GASTROINTESTINAL) SURGERY: Status: ACTIVE | Noted: 2021-09-17

## 2021-09-17 LAB
ALBUMIN SERPL-MCNC: 2.8 G/DL (ref 3.5–5.2)
ALP BLD-CCNC: 164 U/L (ref 40–130)
ALT SERPL-CCNC: 18 U/L (ref 5–41)
ANION GAP SERPL CALCULATED.3IONS-SCNC: 9 MMOL/L (ref 7–19)
AST SERPL-CCNC: 36 U/L (ref 5–40)
BASOPHILS ABSOLUTE: 0.1 K/UL (ref 0–0.2)
BASOPHILS RELATIVE PERCENT: 0.8 % (ref 0–1)
BILIRUB SERPL-MCNC: 0.7 MG/DL (ref 0.2–1.2)
BUN BLDV-MCNC: 12 MG/DL (ref 6–20)
CALCIUM SERPL-MCNC: 8.7 MG/DL (ref 8.6–10)
CHLORIDE BLD-SCNC: 92 MMOL/L (ref 98–111)
CO2: 32 MMOL/L (ref 22–29)
CREAT SERPL-MCNC: 0.6 MG/DL (ref 0.5–1.2)
EOSINOPHILS ABSOLUTE: 0.1 K/UL (ref 0–0.6)
EOSINOPHILS RELATIVE PERCENT: 1.6 % (ref 0–5)
GFR AFRICAN AMERICAN: >59
GFR NON-AFRICAN AMERICAN: >60
GLUCOSE BLD-MCNC: 152 MG/DL (ref 70–99)
GLUCOSE BLD-MCNC: 69 MG/DL (ref 70–99)
GLUCOSE BLD-MCNC: 84 MG/DL (ref 74–109)
HCT VFR BLD CALC: 41.6 % (ref 42–52)
HEMOGLOBIN: 13.2 G/DL (ref 14–18)
IMMATURE GRANULOCYTES #: 0 K/UL
INR BLD: 1.3 (ref 0.88–1.18)
LACTIC ACID: 1.5 MMOL/L (ref 0.5–1.9)
LYMPHOCYTES ABSOLUTE: 0.4 K/UL (ref 1.1–4.5)
LYMPHOCYTES RELATIVE PERCENT: 7 % (ref 20–40)
MAGNESIUM: 2 MG/DL (ref 1.6–2.6)
MCH RBC QN AUTO: 27.7 PG (ref 27–31)
MCHC RBC AUTO-ENTMCNC: 31.7 G/DL (ref 33–37)
MCV RBC AUTO: 87.2 FL (ref 80–94)
MONOCYTES ABSOLUTE: 0.7 K/UL (ref 0–0.9)
MONOCYTES RELATIVE PERCENT: 10.3 % (ref 0–10)
NEUTROPHILS ABSOLUTE: 5.1 K/UL (ref 1.5–7.5)
NEUTROPHILS RELATIVE PERCENT: 79.8 % (ref 50–65)
PDW BLD-RTO: 17.2 % (ref 11.5–14.5)
PERFORMED ON: ABNORMAL
PERFORMED ON: ABNORMAL
PLATELET # BLD: 205 K/UL (ref 130–400)
PMV BLD AUTO: 9.6 FL (ref 9.4–12.4)
POTASSIUM REFLEX MAGNESIUM: 3.2 MMOL/L (ref 3.5–5)
PROTHROMBIN TIME: 16.3 SEC (ref 12–14.6)
RBC # BLD: 4.77 M/UL (ref 4.7–6.1)
REASON FOR REJECTION: NORMAL
REJECTED TEST: NORMAL
SARS-COV-2, NAAT: NOT DETECTED
SODIUM BLD-SCNC: 133 MMOL/L (ref 136–145)
TOTAL PROTEIN: 6.6 G/DL (ref 6.6–8.7)
TROPONIN: <0.01 NG/ML (ref 0–0.03)
WBC # BLD: 6.3 K/UL (ref 4.8–10.8)

## 2021-09-17 PROCEDURE — 6370000000 HC RX 637 (ALT 250 FOR IP): Performed by: NURSE PRACTITIONER

## 2021-09-17 PROCEDURE — 87635 SARS-COV-2 COVID-19 AMP PRB: CPT

## 2021-09-17 PROCEDURE — 70450 CT HEAD/BRAIN W/O DYE: CPT

## 2021-09-17 PROCEDURE — G0378 HOSPITAL OBSERVATION PER HR: HCPCS

## 2021-09-17 PROCEDURE — 71045 X-RAY EXAM CHEST 1 VIEW: CPT

## 2021-09-17 PROCEDURE — 2580000003 HC RX 258: Performed by: NURSE PRACTITIONER

## 2021-09-17 PROCEDURE — 82947 ASSAY GLUCOSE BLOOD QUANT: CPT

## 2021-09-17 PROCEDURE — 85610 PROTHROMBIN TIME: CPT

## 2021-09-17 PROCEDURE — 96361 HYDRATE IV INFUSION ADD-ON: CPT

## 2021-09-17 PROCEDURE — 2500000003 HC RX 250 WO HCPCS

## 2021-09-17 PROCEDURE — 83735 ASSAY OF MAGNESIUM: CPT

## 2021-09-17 PROCEDURE — 85025 COMPLETE CBC W/AUTO DIFF WBC: CPT

## 2021-09-17 PROCEDURE — 84484 ASSAY OF TROPONIN QUANT: CPT

## 2021-09-17 PROCEDURE — 36415 COLL VENOUS BLD VENIPUNCTURE: CPT

## 2021-09-17 PROCEDURE — 87040 BLOOD CULTURE FOR BACTERIA: CPT

## 2021-09-17 PROCEDURE — 96374 THER/PROPH/DIAG INJ IV PUSH: CPT

## 2021-09-17 PROCEDURE — 83605 ASSAY OF LACTIC ACID: CPT

## 2021-09-17 PROCEDURE — 72125 CT NECK SPINE W/O DYE: CPT

## 2021-09-17 PROCEDURE — 99284 EMERGENCY DEPT VISIT MOD MDM: CPT

## 2021-09-17 PROCEDURE — 93005 ELECTROCARDIOGRAM TRACING: CPT

## 2021-09-17 PROCEDURE — 80053 COMPREHEN METABOLIC PANEL: CPT

## 2021-09-17 RX ORDER — SODIUM CHLORIDE 9 MG/ML
25 INJECTION, SOLUTION INTRAVENOUS PRN
Status: DISCONTINUED | OUTPATIENT
Start: 2021-09-17 | End: 2021-09-21 | Stop reason: HOSPADM

## 2021-09-17 RX ORDER — ONDANSETRON 2 MG/ML
4 INJECTION INTRAMUSCULAR; INTRAVENOUS EVERY 6 HOURS PRN
Status: DISCONTINUED | OUTPATIENT
Start: 2021-09-17 | End: 2021-09-21 | Stop reason: HOSPADM

## 2021-09-17 RX ORDER — SODIUM CHLORIDE 0.9 % (FLUSH) 0.9 %
5-40 SYRINGE (ML) INJECTION EVERY 12 HOURS SCHEDULED
Status: DISCONTINUED | OUTPATIENT
Start: 2021-09-17 | End: 2021-09-21 | Stop reason: HOSPADM

## 2021-09-17 RX ORDER — SODIUM CHLORIDE, SODIUM LACTATE, POTASSIUM CHLORIDE, CALCIUM CHLORIDE 600; 310; 30; 20 MG/100ML; MG/100ML; MG/100ML; MG/100ML
1000 INJECTION, SOLUTION INTRAVENOUS ONCE
Status: COMPLETED | OUTPATIENT
Start: 2021-09-17 | End: 2021-09-17

## 2021-09-17 RX ORDER — DEXTROSE MONOHYDRATE 25 G/50ML
25 INJECTION, SOLUTION INTRAVENOUS ONCE
Status: COMPLETED | OUTPATIENT
Start: 2021-09-17 | End: 2021-09-17

## 2021-09-17 RX ORDER — DEXTROSE AND SODIUM CHLORIDE 5; .45 G/100ML; G/100ML
INJECTION, SOLUTION INTRAVENOUS CONTINUOUS
Status: DISCONTINUED | OUTPATIENT
Start: 2021-09-17 | End: 2021-09-21 | Stop reason: HOSPADM

## 2021-09-17 RX ORDER — DEXTROSE MONOHYDRATE 25 G/50ML
INJECTION, SOLUTION INTRAVENOUS
Status: COMPLETED
Start: 2021-09-17 | End: 2021-09-17

## 2021-09-17 RX ORDER — ONDANSETRON 4 MG/1
4 TABLET, ORALLY DISINTEGRATING ORAL EVERY 8 HOURS PRN
Status: DISCONTINUED | OUTPATIENT
Start: 2021-09-17 | End: 2021-09-21 | Stop reason: HOSPADM

## 2021-09-17 RX ORDER — SODIUM CHLORIDE 0.9 % (FLUSH) 0.9 %
5-40 SYRINGE (ML) INJECTION PRN
Status: DISCONTINUED | OUTPATIENT
Start: 2021-09-17 | End: 2021-09-21 | Stop reason: HOSPADM

## 2021-09-17 RX ORDER — ACETAMINOPHEN 325 MG/1
650 TABLET ORAL EVERY 6 HOURS PRN
Status: DISCONTINUED | OUTPATIENT
Start: 2021-09-17 | End: 2021-09-21 | Stop reason: HOSPADM

## 2021-09-17 RX ORDER — POLYETHYLENE GLYCOL 3350 17 G/17G
17 POWDER, FOR SOLUTION ORAL DAILY PRN
Status: DISCONTINUED | OUTPATIENT
Start: 2021-09-17 | End: 2021-09-21 | Stop reason: HOSPADM

## 2021-09-17 RX ORDER — ACETAMINOPHEN 650 MG/1
650 SUPPOSITORY RECTAL EVERY 6 HOURS PRN
Status: DISCONTINUED | OUTPATIENT
Start: 2021-09-17 | End: 2021-09-21 | Stop reason: HOSPADM

## 2021-09-17 RX ADMIN — DEXTROSE MONOHYDRATE 25 G: 25 INJECTION, SOLUTION INTRAVENOUS at 19:35

## 2021-09-17 RX ADMIN — POTASSIUM BICARBONATE 40 MEQ: 782 TABLET, EFFERVESCENT ORAL at 19:40

## 2021-09-17 RX ADMIN — DEXTROSE AND SODIUM CHLORIDE: 5; 450 INJECTION, SOLUTION INTRAVENOUS at 19:46

## 2021-09-17 RX ADMIN — SODIUM CHLORIDE, POTASSIUM CHLORIDE, SODIUM LACTATE AND CALCIUM CHLORIDE 1000 ML: 600; 310; 30; 20 INJECTION, SOLUTION INTRAVENOUS at 18:13

## 2021-09-17 ASSESSMENT — ENCOUNTER SYMPTOMS
BACK PAIN: 0
ABDOMINAL PAIN: 0

## 2021-09-17 NOTE — ED PROVIDER NOTES
[facility] EMERGENCY DEPT  eMERGENCY dEPARTMENT eNCOUnter      Pt Name: Samantha Munguia  MRN: [id number]  [place] 1960  Date of evaluation: 9/17/2021  Provider: ZHEN Jennings    CHIEF COMPLAINT       Chief Complaint   Patient presents with    Other     hypoglycemia         HISTORY OF PRESENT ILLNESS   (Location/Symptom, Timing/Onset,Context/Setting, Quality, Duration, Modifying Factors, Severity)  Note limiting factors. Kael Reich a 63 y.o. male who presents to the emergency department for evaluation of low blood sugar. Pt presents via EMS with reported low blood sugar. I called Geovany Dunham, pt's daughter, who tells me that patient had fainting episode earlier today. She tells me that he had gotten out of bed for a bath with home health relating that he had loss of consciousness. She tells me that she, her brother and home health preventing him from falling to ground. She relates that he was evaluated in June for lung cancer having had radiation for this. He uses oxygen at 2.5 liters per nasal prongs at home. Pt tells me that he has history of colon cancer diagnosed 5 yrs ago. She tells me that he hasn't been able to tolerate chemotherapy due to side effects with last dose given 3 months ago. He follows with Dr. Macie Wilks per patient. He tells me that he had partial colon resection in past. He has history of diabetes. Daughter tells me that blood sugar was 147 earlier having received insulin subsequently found \"twitching\" by family with reported \"low blood sugar\". Pt currently has D10 infusing. Pt tells me that he hasn't been able to get out of bed and walk due to generalized weakness and has had decreased food intake with weight loss over past few months. He denies injury from fall. He tells me that he has had headache over right side of head and neck pain over past several weeks. He denies fevers as well as vomiting, diarrhea. He has had no chest pain, abdominal pain, new or worsening shortness of breath. HPI    Nursing Notes were reviewed. REVIEW OF SYSTEMS    (2-9 systems for level 4, 10 or more for level 5)     Review of Systems   Constitutional: Negative for fever. Gastrointestinal: Negative for abdominal pain. Musculoskeletal: Positive for neck pain. Negative for back pain. Neurological: Positive for syncope, weakness (global) and headaches. All other systems reviewed and are negative. A complete review of systems was performed and is negative except as noted above in the HPI.        PAST MEDICAL HISTORY     Past Medical History:   Diagnosis Date    Cancer [facility])     colon    Diabetes mellitus ([facility]     Essential hypertension 5/6/2019    Hyperlipidemia 7/26/2017    Last Assessment & Plan:  Formatting of this note might be different from the original. On Lipitor    Palliative care patient 06/01/2021         SURGICAL HISTORY       Past Surgical History:   Procedure Laterality Date    ABDOMEN SURGERY      COLONOSCOPY      ENDOSCOPY, COLON, DIAGNOSTIC      TUNNELED VENOUS PORT PLACEMENT           CURRENT MEDICATIONS       Previous Medications    ALBUTEROL SULFATE HFA (VENTOLIN HFA) 108 (90 BASE) MCG/ACT INHALER    Inhale 2 puffs into the lungs every 6 hours as needed for Wheezing    APIXABAN (ELIQUIS) 5 MG TABS TABLET    Take by mouth 2 times daily    FOLIC ACID (FOLVITE) 1 MG TABLET    Take 1 tablet by mouth daily    HYDROCHLOROTHIAZIDE (HYDRODIURIL) 25 MG TABLET    Take 25 mg by mouth daily    INSULIN GLARGINE (LANTUS;BASAGLAR) 100 UNIT/ML INJECTION PEN    Inject 32 Units into the skin daily    INSULIN LISPRO (HUMALOG KWIKPEN U-200) 200 UNIT/ML SOPN PEN    Inject 8 Units into the skin 3 times daily as needed    IPRATROPIUM-ALBUTEROL (DUONEB) 0.5-2.5 (3) MG/3ML SOLN NEBULIZER SOLUTION    Inhale 3 mLs into the lungs every 4 hours (while awake)    NEBULIZERS (COMPRESSOR/NEBULIZER) MISC    1 vial by Does not apply route 4 times daily    ONDANSETRON (ZOFRAN) 4 MG TABLET    Take 2 tablets by  Physically Abused:     Sexually Abused:        SCREENINGS             PHYSICAL EXAM    (up to 7 for level 4, 8 or more for level 5)     ED Triage Vitals [09/17/21 1654]   BP Temp Temp Source Pulse Resp SpO2 Height Weight   119/82 97.8 °F (36.6 °C) Axillary 91 18 93 % 6' 1\" (1.854 m) 170 lb (77.1 kg)       Physical Exam  Vitals reviewed. Constitutional:       Comments: Frail cachetic appearing 63 yr old male, slow, quiet speech   HENT:      Head: Normocephalic. Right Ear: External ear normal.      Left Ear: External ear normal.      Mouth/Throat:      Mouth: Mucous membranes are dry. Eyes:      Conjunctiva/sclera: Conjunctivae normal.      Pupils: Pupils are equal, round, and reactive to light. Cardiovascular:      Rate and Rhythm: Normal rate and regular rhythm. Heart sounds: Normal heart sounds. Pulmonary:      Effort: Pulmonary effort is normal.      Comments: Decreased breath sounds throughout   Abdominal:      General: Bowel sounds are normal.      Palpations: Abdomen is soft. Tenderness: There is no abdominal tenderness. Musculoskeletal:      Cervical back: Normal range of motion. Comments: Moves extremities weekly  He is unable to raise legs/heels from bed   Skin:     General: Skin is warm and dry. Neurological:      Mental Status: He is alert and oriented to person, place, and time. DIAGNOSTIC RESULTS     EKG: All EKG's are interpreted by the Emergency Department Physician who either signs or Co-signs this chart in the absence of acardiologist.    There is a regular rate and rhythm. SR hr 74b/min Normal IL interval and normal P waves. Normal QRS interval. Normal QT interval. No obvious ST elevation or ST depression.        RADIOLOGY:   Non-plain film images such as CT, Ultrasound andMRI are read by the radiologist. Plain radiographic images are visualized and preliminarily interpreted by the emergency physician with the below findings:        Interpretation per the Radiologist below, if available at the time of this note:    XR CHEST PORTABLE   Final Result   Impression:   Complete opacification of the right hemithorax, suspected   Lung collapse given the history of prior right perihilar mass. Signed by Dr Chilo Obregon   Final Result   CT head. No acute intracranial abnormalities. CT cervical spine. 1.  No acute osseous posttraumatic findings. 2.  Complete opacification of the visualized right upper lobe. Signed by Dr J Luis Gonzalez   Final Result   CT head. No acute intracranial abnormalities. CT cervical spine. 1.  No acute osseous posttraumatic findings. 2.  Complete opacification of the visualized right upper lobe.    Signed by Dr Marii Perez            ED BEDSIDE ULTRASOUND:   Performed by ED Physician - none    LABS:  Labs Reviewed   COMPREHENSIVE METABOLIC PANEL W/ REFLEX TO MG FOR LOW K - Abnormal; Notable for the following components:       Result Value    Sodium 133 (*)     Potassium reflex Magnesium 3.2 (*)     Chloride 92 (*)     CO2 32 (*)     Albumin 2.8 (*)     Alkaline Phosphatase 164 (*)     All other components within normal limits   PROTIME-INR - Abnormal; Notable for the following components:    Protime 16.3 (*)     INR 1.30 (*)     All other components within normal limits   CBC WITH AUTO DIFFERENTIAL - Abnormal; Notable for the following components:    Hemoglobin 13.2 (*)     Hematocrit 41.6 (*)     MCHC 31.7 (*)     RDW 17.2 (*)     Neutrophils % 79.8 (*)     Lymphocytes % 7.0 (*)     Monocytes % 10.3 (*)     Lymphocytes Absolute 0.4 (*)     All other components within normal limits   POCT GLUCOSE - Abnormal; Notable for the following components:    POC Glucose 69 (*)     All other components within normal limits   CULTURE, BLOOD 1   CULTURE, BLOOD 2   LACTIC ACID, PLASMA   TROPONIN   SPECIMEN REJECTION   MAGNESIUM   URINE RT REFLEX TO CULTURE   POCT GLUCOSE       All other labs were within normal range or not returned as of this dictation. RE-ASSESSMENT     Discussed with Dr. Yadira Joseph who will admit patient to hospitalist service. EMERGENCY DEPARTMENT COURSE and DIFFERENTIALDIAGNOSIS/MDM:   Vitals:    Vitals:    09/17/21 1654 09/17/21 1942   BP: 119/82 101/77   Pulse: 91 71   Resp: 18 16   Temp: 97.8 °F (36.6 °C)    TempSrc: Axillary    SpO2: 93% 95%   Weight: 170 lb (77.1 kg)    Height: 6' 1\" (1.854 m)        MDM      CONSULTS:  None    PROCEDURES:  Unless otherwise notedbelow, none     Procedures    FINAL IMPRESSION     1. Hypoglycemia    2. Failure to thrive in adult    3. History of lung cancer    4. History of colon cancer          DISPOSITION/PLAN   DISPOSITION        PATIENT REFERRED TO:  No follow-up provider specified.     DISCHARGE MEDICATIONS:       Current Discharge Medication List          (Pleasenote that portions of this note were completed with a voice recognition program.  Efforts were made to edit the dictations but occasionally words are mis-transcribed.)              Aspen Matthews, ZHEN  09/18/21 0034

## 2021-09-17 NOTE — ED NOTES
Bed: 15  Expected date:   Expected time:   Means of arrival:   Comments:  EMS haris Moyer RN  09/17/21 7715

## 2021-09-17 NOTE — ED PROVIDER NOTES
Attending Supervisory Note/Shared Visit    I have reviewed the mid-levels findings and agree. Have discussed the case and reviewed the diagnostic data. I am in agreement with the plan and disposition.     Tony Dubose MD  Attending Emergency Physician        Jaimie Perez MD  09/17/21 [id number]

## 2021-09-17 NOTE — ED TRIAGE NOTES
Pt to ED via EMS from home where pt lives with his daughter. Pt's BG was noted to read \"low\" upon EMS initial assessment, EMS gave amp of D50 which giuliano BG to 302. 15 minutes later BG was back down to 116. D10 drip initiated by EMS at that time. Pt is currently drowsy and AOx2 (does not know time).

## 2021-09-18 PROBLEM — L60.8 TOENAIL DEFORMITY: Status: ACTIVE | Noted: 2019-02-14

## 2021-09-18 PROBLEM — R04.2 HEMOPTYSIS: Status: ACTIVE | Noted: 2021-06-07

## 2021-09-18 PROBLEM — Z45.2 ENCOUNTER FOR CARE RELATED TO PORT-A-CATH: Status: ACTIVE | Noted: 2018-01-31

## 2021-09-18 PROBLEM — Z72.0 TOBACCO ABUSE: Status: ACTIVE | Noted: 2017-07-26

## 2021-09-18 PROBLEM — K52.9 COLITIS: Status: ACTIVE | Noted: 2017-07-26

## 2021-09-18 PROBLEM — Z20.822 SUSPECTED COVID-19 VIRUS INFECTION: Status: ACTIVE | Noted: 2020-11-11

## 2021-09-18 PROBLEM — R06.02 SHORTNESS OF BREATH: Status: ACTIVE | Noted: 2021-06-07

## 2021-09-18 PROBLEM — F17.219 CIGARETTE NICOTINE DEPENDENCE WITH NICOTINE-INDUCED DISORDER: Status: ACTIVE | Noted: 2020-08-23

## 2021-09-18 PROBLEM — K56.7 ILEUS (HCC): Status: ACTIVE | Noted: 2020-08-23

## 2021-09-18 PROBLEM — C18.0 MALIGNANT NEOPLASM OF CECUM (HCC): Status: ACTIVE | Noted: 2017-10-17

## 2021-09-18 PROBLEM — E87.1 HYPONATREMIA: Status: ACTIVE | Noted: 2020-08-23

## 2021-09-18 PROBLEM — C78.01: Status: ACTIVE | Noted: 2021-06-07

## 2021-09-18 LAB
ANION GAP SERPL CALCULATED.3IONS-SCNC: 11 MMOL/L (ref 7–19)
BUN BLDV-MCNC: 10 MG/DL (ref 6–20)
CALCIUM SERPL-MCNC: 8.3 MG/DL (ref 8.6–10)
CHLORIDE BLD-SCNC: 92 MMOL/L (ref 98–111)
CO2: 29 MMOL/L (ref 22–29)
CREAT SERPL-MCNC: 0.4 MG/DL (ref 0.5–1.2)
GFR AFRICAN AMERICAN: >59
GFR NON-AFRICAN AMERICAN: >60
GLUCOSE BLD-MCNC: 109 MG/DL (ref 70–99)
GLUCOSE BLD-MCNC: 120 MG/DL (ref 70–99)
GLUCOSE BLD-MCNC: 120 MG/DL (ref 74–109)
GLUCOSE BLD-MCNC: 122 MG/DL (ref 70–99)
GLUCOSE BLD-MCNC: 127 MG/DL (ref 70–99)
GLUCOSE BLD-MCNC: 134 MG/DL (ref 70–99)
GLUCOSE BLD-MCNC: 134 MG/DL (ref 70–99)
GLUCOSE BLD-MCNC: 136 MG/DL (ref 70–99)
HCT VFR BLD CALC: 44.6 % (ref 42–52)
HEMOGLOBIN: 13.9 G/DL (ref 14–18)
MAGNESIUM: 1.7 MG/DL (ref 1.6–2.6)
MCH RBC QN AUTO: 27.3 PG (ref 27–31)
MCHC RBC AUTO-ENTMCNC: 31.2 G/DL (ref 33–37)
MCV RBC AUTO: 87.6 FL (ref 80–94)
PDW BLD-RTO: 17.5 % (ref 11.5–14.5)
PERFORMED ON: ABNORMAL
PLATELET # BLD: 208 K/UL (ref 130–400)
PMV BLD AUTO: 9 FL (ref 9.4–12.4)
POTASSIUM SERPL-SCNC: 3.7 MMOL/L (ref 3.5–5)
RBC # BLD: 5.09 M/UL (ref 4.7–6.1)
SODIUM BLD-SCNC: 132 MMOL/L (ref 136–145)
WBC # BLD: 5.3 K/UL (ref 4.8–10.8)

## 2021-09-18 PROCEDURE — 6370000000 HC RX 637 (ALT 250 FOR IP): Performed by: NURSE PRACTITIONER

## 2021-09-18 PROCEDURE — 2580000003 HC RX 258: Performed by: HOSPITALIST

## 2021-09-18 PROCEDURE — 82947 ASSAY GLUCOSE BLOOD QUANT: CPT

## 2021-09-18 PROCEDURE — 36415 COLL VENOUS BLD VENIPUNCTURE: CPT

## 2021-09-18 PROCEDURE — G0378 HOSPITAL OBSERVATION PER HR: HCPCS

## 2021-09-18 PROCEDURE — 6360000002 HC RX W HCPCS: Performed by: HOSPITALIST

## 2021-09-18 PROCEDURE — 96372 THER/PROPH/DIAG INJ SC/IM: CPT

## 2021-09-18 PROCEDURE — 83735 ASSAY OF MAGNESIUM: CPT

## 2021-09-18 PROCEDURE — 96375 TX/PRO/DX INJ NEW DRUG ADDON: CPT

## 2021-09-18 PROCEDURE — 96376 TX/PRO/DX INJ SAME DRUG ADON: CPT

## 2021-09-18 PROCEDURE — 2500000003 HC RX 250 WO HCPCS: Performed by: NURSE PRACTITIONER

## 2021-09-18 PROCEDURE — 85027 COMPLETE CBC AUTOMATED: CPT

## 2021-09-18 PROCEDURE — 6370000000 HC RX 637 (ALT 250 FOR IP): Performed by: HOSPITALIST

## 2021-09-18 PROCEDURE — 80048 BASIC METABOLIC PNL TOTAL CA: CPT

## 2021-09-18 RX ORDER — FOLIC ACID 1 MG/1
1 TABLET ORAL DAILY
Status: DISCONTINUED | OUTPATIENT
Start: 2021-09-18 | End: 2021-09-21 | Stop reason: HOSPADM

## 2021-09-18 RX ORDER — HYDROCODONE BITARTRATE AND ACETAMINOPHEN 5; 325 MG/1; MG/1
1 TABLET ORAL EVERY 4 HOURS PRN
COMMUNITY
Start: 2021-06-03

## 2021-09-18 RX ORDER — ALBUTEROL SULFATE 90 UG/1
2 AEROSOL, METERED RESPIRATORY (INHALATION) EVERY 6 HOURS PRN
Status: DISCONTINUED | OUTPATIENT
Start: 2021-09-18 | End: 2021-09-18 | Stop reason: CLARIF

## 2021-09-18 RX ORDER — DEXTROSE MONOHYDRATE 50 MG/ML
100 INJECTION, SOLUTION INTRAVENOUS PRN
Status: DISCONTINUED | OUTPATIENT
Start: 2021-09-18 | End: 2021-09-21 | Stop reason: HOSPADM

## 2021-09-18 RX ORDER — MIRTAZAPINE 7.5 MG/1
7.5 TABLET, FILM COATED ORAL
COMMUNITY
Start: 2021-08-25 | End: 2021-11-24

## 2021-09-18 RX ORDER — NICOTINE POLACRILEX 4 MG
15 LOZENGE BUCCAL PRN
Status: DISCONTINUED | OUTPATIENT
Start: 2021-09-18 | End: 2021-09-21 | Stop reason: HOSPADM

## 2021-09-18 RX ORDER — HYDROCHLOROTHIAZIDE 25 MG/1
25 TABLET ORAL DAILY
COMMUNITY
Start: 2021-03-01

## 2021-09-18 RX ORDER — PROCHLORPERAZINE MALEATE 10 MG
10 TABLET ORAL EVERY 6 HOURS PRN
Status: DISCONTINUED | OUTPATIENT
Start: 2021-09-18 | End: 2021-09-21 | Stop reason: HOSPADM

## 2021-09-18 RX ORDER — ALBUTEROL SULFATE 2.5 MG/3ML
2.5 SOLUTION RESPIRATORY (INHALATION) EVERY 6 HOURS PRN
Status: DISCONTINUED | OUTPATIENT
Start: 2021-09-18 | End: 2021-09-21 | Stop reason: HOSPADM

## 2021-09-18 RX ORDER — DEXTROSE MONOHYDRATE 25 G/50ML
12.5 INJECTION, SOLUTION INTRAVENOUS PRN
Status: DISCONTINUED | OUTPATIENT
Start: 2021-09-18 | End: 2021-09-21 | Stop reason: HOSPADM

## 2021-09-18 RX ADMIN — FOLIC ACID 1 MG: 1 TABLET ORAL at 10:26

## 2021-09-18 RX ADMIN — DEXTROSE AND SODIUM CHLORIDE: 5; 450 INJECTION, SOLUTION INTRAVENOUS at 17:12

## 2021-09-18 RX ADMIN — SODIUM CHLORIDE, PRESERVATIVE FREE 10 ML: 5 INJECTION INTRAVENOUS at 21:49

## 2021-09-18 RX ADMIN — ENOXAPARIN SODIUM 40 MG: 40 INJECTION SUBCUTANEOUS at 10:26

## 2021-09-18 RX ADMIN — FAMOTIDINE 20 MG: 10 INJECTION, SOLUTION INTRAVENOUS at 10:26

## 2021-09-18 RX ADMIN — ACETAMINOPHEN 650 MG: 325 TABLET ORAL at 21:49

## 2021-09-18 RX ADMIN — ACETAMINOPHEN 650 MG: 325 TABLET ORAL at 15:02

## 2021-09-18 RX ADMIN — ACETAMINOPHEN 650 MG: 325 TABLET ORAL at 05:38

## 2021-09-18 RX ADMIN — FAMOTIDINE 20 MG: 10 INJECTION, SOLUTION INTRAVENOUS at 21:49

## 2021-09-18 ASSESSMENT — PAIN SCALES - GENERAL
PAINLEVEL_OUTOF10: 7
PAINLEVEL_OUTOF10: 4
PAINLEVEL_OUTOF10: 3

## 2021-09-18 ASSESSMENT — ENCOUNTER SYMPTOMS
COUGH: 1
BLOOD IN STOOL: 0
DIARRHEA: 0
VOMITING: 0
ABDOMINAL DISTENTION: 0
COLOR CHANGE: 0
WHEEZING: 0
SHORTNESS OF BREATH: 0
CONSTIPATION: 0
NAUSEA: 0
ABDOMINAL PAIN: 0

## 2021-09-18 NOTE — H&P
[id number] [facility]      Hospitalist - History & Physical      PCP: Kirk Sylvester MD, MD    Date of Admission: 9/17/2021    Date of Service: 9/18/2021    Chief Complaint: Hypoglycemia    History Of Present Illness: The patient is a 63 y.o. male with a history of colon cancer, hypertension, HLD, and diabetes who presented to [address] [place] [address] ED complaining of hypoglycemia. Further ED work-up revealed , K3.2, CL 92, CO2 32, BUN 12, creatinine 0.6. Troponin negative. Albumin 2.8. H-13.2/H-41.6. CT of the head was negative. CT of cervical spine showed no acute findings. Chest x-ray showed complete opacification of the right hemothorax, suspected lung collapse. Hospitalist service requests did to admit patient for observation due to hypoglycemia. The patient states that he does not remember anything and when he woke up he was at the hospital.  He has apparently been having episodic falls at home. He endorsed to me that he did not have diabetes, however, he is on insulin at home. He was apparently diagnosed with lung cancer in June and has been following Dr. Bibi Vieira. He had apparently been undergoing treatments, however has not been able to tolerate the side effects of chemotherapy. He reports significant weight loss, poor appetite, and fatigue. He is on O2 at 2-1/2 L at home. He denies any worsening shortness of breath. He does complain of being cold but denies chills. He denies any recent fevers. He denies pain. He denies any recent nausea, vomiting, or diarrhea.      Past Medical History:        Diagnosis Date    Cancer [facility])     colon    Diabetes mellitus ([facility] [address]     Essential hypertension 5/6/2019    Hyperlipidemia 7/26/2017    Last Assessment & Plan:  Formatting of this note might be different from the original. On Lipitor    Palliative care patient 06/01/2021       Past Surgical History:        Procedure Laterality Date    ABDOMEN SURGERY      COLONOSCOPY      ENDOSCOPY, COLON, DIAGNOSTIC      TUNNELED VENOUS PORT PLACEMENT         Home Medications:  Prior to Admission medications    Medication Sig Start Date End Date Taking? Authorizing Provider   ondansetron (ZOFRAN) 4 MG tablet Take 2 tablets by mouth every 8 hours as needed for Nausea or Vomiting 6/11/21   Sera Jarvis MD   ipratropium-albuterol (DUONEB) 0.5-2.5 (3) MG/3ML SOLN nebulizer solution Inhale 3 mLs into the lungs every 4 hours (while awake) 6/3/21 7/3/21  Josh Moncada MD   folic acid (FOLVITE) 1 MG tablet Take 1 tablet by mouth daily 6/4/21 7/4/21  Josh Moncada MD   Nebulizers (COMPRESSOR/NEBULIZER) MISC 1 vial by Does not apply route 4 times daily 6/3/21 9/1/21  Josh Moncada MD   prochlorperazine (COMPAZINE) 10 MG tablet Take 10 mg by mouth every 6 hours as needed    Historical Provider, MD   insulin glargine (LANTUS;BASAGLAR) 100 UNIT/ML injection pen Inject 32 Units into the skin daily 4/29/21   Historical Provider, MD   insulin lispro (HUMALOG KWIKPEN U-200) 200 UNIT/ML SOPN pen Inject 8 Units into the skin 3 times daily as needed 4/23/21   Historical Provider, MD   albuterol sulfate HFA (VENTOLIN HFA) 108 (90 Base) MCG/ACT inhaler Inhale 2 puffs into the lungs every 6 hours as needed for Wheezing    Historical Provider, MD   apixaban (ELIQUIS) 5 MG TABS tablet Take by mouth 2 times daily    Historical Provider, MD       Allergies:    Varenicline    Social History:    The patient currently lives at home with daughter and son  Tobacco:   reports that he has quit smoking. His smoking use included cigarettes. He has a 40.00 pack-year smoking history. He has never used smokeless tobacco.  Alcohol:   reports previous alcohol use.   Illicit Drugs: Denies    Family History:      Problem Relation Age of Onset    Heart Attack Father     Heart Attack Maternal Grandfather     Heart Attack Paternal Grandfather        Review of Systems:   Review of Systems   Constitutional: Positive for activity change, appetite change, fatigue and unexpected weight change. Negative for chills, diaphoresis and fever. HENT: Negative for congestion and ear pain. Eyes: Negative for visual disturbance. Respiratory: Positive for cough (Chronic). Negative for shortness of breath and wheezing. Cardiovascular: Negative for chest pain, palpitations and leg swelling. Gastrointestinal: Negative for abdominal distention, abdominal pain, blood in stool, constipation, diarrhea, nausea and vomiting. Endocrine: Negative for cold intolerance and heat intolerance. Genitourinary: Negative for difficulty urinating, flank pain, frequency and urgency. Musculoskeletal: Negative for arthralgias and myalgias. Skin: Negative for color change and wound. Neurological: Positive for dizziness and weakness. Negative for syncope, light-headedness, numbness and headaches. Hematological: Does not bruise/bleed easily. Psychiatric/Behavioral: Negative for agitation, confusion and dysphoric mood. 14 point review of systems is negative except as specifically addressed above. Physical Examination:  BP (!) 119/90   Pulse 88   Temp 96.5 °F (35.8 °C) (Temporal)   Resp 17   Ht 6' 1\" (1.854 m)   Wt 170 lb (77.1 kg)   SpO2 94%   BMI 22.43 kg/m²   Physical Exam  Constitutional:       General: He is not in acute distress. Appearance: Normal appearance. He is ill-appearing (Chronically). HENT:      Head: Normocephalic and atraumatic. Right Ear: External ear normal.      Left Ear: External ear normal.      Nose: Nose normal.      Mouth/Throat:      Mouth: Mucous membranes are moist.   Eyes:      Extraocular Movements: Extraocular movements intact. Conjunctiva/sclera: Conjunctivae normal.      Pupils: Pupils are equal, round, and reactive to light. Cardiovascular:      Rate and Rhythm: Normal rate and regular rhythm. Pulses: Normal pulses. Heart sounds: Normal heart sounds.    Pulmonary: Effort: Pulmonary effort is normal. No respiratory distress. Breath sounds: Normal breath sounds. Decreased air movement present. No wheezing, rhonchi or rales. Comments: Very little air movement to the right lung likely secondary to lung mass  Abdominal:      General: Bowel sounds are normal. There is no distension. Palpations: Abdomen is soft. Tenderness: There is no abdominal tenderness. Musculoskeletal:         General: No swelling, tenderness or deformity. Normal range of motion. Cervical back: Normal range of motion and neck supple. No muscular tenderness. Right lower leg: No edema. Left lower leg: No edema. Skin:     General: Skin is warm and dry. Findings: No bruising or lesion. Neurological:      Mental Status: He is alert and oriented to person, place, and time. Psychiatric:         Mood and Affect: Mood normal.         Behavior: Behavior normal.         Thought Content: Thought content normal.          Diagnostic Data:  CBC:  Recent Labs     09/17/21  1901   WBC 6.3   HGB 13.2*   HCT 41.6*        BMP:  Recent Labs     09/17/21  1805   *   K 3.2*   CL 92*   CO2 32*   BUN 12   CREATININE 0.6   CALCIUM 8.7     Recent Labs     09/17/21  1805   AST 36   ALT 18   BILITOT 0.7   ALKPHOS 164*     Coag Panel:   Recent Labs     09/17/21  1805   INR 1.30*   PROTIME 16.3*     Cardiac Enzymes:   Recent Labs     09/17/21  1805   TROPONINI <0.01     ABGs:  Lab Results   Component Value Date    PHART 7.430 05/30/2021    PO2ART 63.0 05/30/2021    EPJ2FGM 35.0 05/30/2021       CT HEAD WO CONTRAST    Result Date: 9/17/2021  EXAM: CT OF THE HEAD WITHOUT IV CONTRAST CT CERVICAL SPINE WITHOUT IV CONTRAST 9/17/2021 COMPARISON: None. INDICATION: Headache, fall PROCEDURE: Non contrast enhanced head CT and cervical spine CT were performed. The head images were formatted in the axial plane at 5 mm thick intervals.  The cervical spine images were formatted in the axial, coronal and sagittal planes. Radiation dose equals DLP 1426 mGy-cm. Automated exposure control dose reduction technique was implemented. FINDINGS: HEAD: Mild generalized volume loss. Mild chronic microvascular changes. . There is no evidence of mass-effect or midline shift. The gray-white differentiation is preserved. There is no evidence of intracranial contusion, hemorrhage, or skull fracture. Mastoid air cells are clear. Nonobstructive paranasal sinus mucosal disease. CERVICAL SPINE: The odontoid process is well approximated with the anterior body of C1 and well aligned between the lateral masses of C1. There is no spondylolisthesis. No acute compression deformity. No dislocation. Multilevel degenerative changes, with distances and facet arthropathy. Complete opacification of the visualized right upper lobe. CT head. No acute intracranial abnormalities. CT cervical spine. 1.  No acute osseous posttraumatic findings. 2.  Complete opacification of the visualized right upper lobe. Signed by Dr Blaise Mensah    Result Date: 9/17/2021  EXAM: CT OF THE HEAD WITHOUT IV CONTRAST CT CERVICAL SPINE WITHOUT IV CONTRAST 9/17/2021 COMPARISON: None. INDICATION: Headache, fall PROCEDURE: Non contrast enhanced head CT and cervical spine CT were performed. The head images were formatted in the axial plane at 5 mm thick intervals. The cervical spine images were formatted in the axial, coronal and sagittal planes. Radiation dose equals DLP 1426 mGy-cm. Automated exposure control dose reduction technique was implemented. FINDINGS: HEAD: Mild generalized volume loss. Mild chronic microvascular changes. . There is no evidence of mass-effect or midline shift. The gray-white differentiation is preserved. There is no evidence of intracranial contusion, hemorrhage, or skull fracture. Mastoid air cells are clear. Nonobstructive paranasal sinus mucosal disease.  CERVICAL SPINE: The odontoid process is well approximated with the anterior body of C1 and well aligned between the lateral masses of C1. There is no spondylolisthesis. No acute compression deformity. No dislocation. Multilevel degenerative changes, with distances and facet arthropathy. Complete opacification of the visualized right upper lobe. CT head. No acute intracranial abnormalities. CT cervical spine. 1.  No acute osseous posttraumatic findings. 2.  Complete opacification of the visualized right upper lobe. Signed by Dr Elizabeth Coburn    XR CHEST PORTABLE    Result Date: 9/17/2021  Exam:   XR CHEST PORTABLE  Date:  9/17/2021 History:  Male, age  63 years; fall COMPARISON:  Chest x-ray dated May 30, 2021. Findings : Right-sided chest port in place. The right heart is indistinct. There is mediastinal shift to the right. Complete opacification of the right hemithorax suspected to reflect right lung collapse. .  The bones show no acute pathology. Impression: Complete opacification of the right hemithorax, suspected Lung collapse given the history of prior right perihilar mass. Signed by Dr Elizabeth Coburn      Assessment/Plan:  Principal Problem:    Hypoglycemia after GI (gastrointestinal) surgery   -Admit MedSurg-DNR   -D5/ 0.45 NS @100 ml/hr   -Vital signs every shift   -I&O every shift   -A. m. labs   -N.p.o.   -VTE prophylaxis   -GI prophylaxis   -Every 2 hours Accu-Cheks    -hypoglycemic protocol   -Continue to monitor for any medical changes and provide supportive care    Active Problems:    Primary colon cancer with metastasis to other site (HCC)/Palliative care patient   -Consult palliative care   -Consult hospice   -Consult case management      Diabetes ([facility] [address]   -Hold home insulins   -Every 2 hours Accu-Cheks        Essential hypertension   -Noted we will monitor.     -N.p.o., antihypertensive held      Hyperlipidemia   -Noted we will monitor   -Currently n.p.o. home statin held     VTE prophylaxis-Lovenox  GI prophylaxis-Pepcid    Signed:  ZHEN Wei, 9/18/2021 12:45 AM       Attestation Statement     I have independently seen and examined this patient and agree with the asesment and plan by mid level provider                                 T          Objective:   Vitals: /77   Pulse 98   Temp 96.8 °F (36 °C) (Temporal)   Resp 17   Ht 6' 1\" (1.854 m)   Wt 170 lb (77.1 kg)   SpO2 94%   BMI 22.43 kg/m²   General appearance: alert, no distress  Skin: Skin color, texture, turgor normal.   HEENT: Head: Normocephalic, no lesions, without obvious abnormality.   Neck: no adenopathy, no carotid bruit, no JVD and supple, symmetrical, trachea midline  Lungs: clear to auscultation bilaterally  Heart: regular rate and rhythm, S1, S2 normal, no murmur, click, rub or gallop  Abdomen: soft, non-tender; bowel sounds normal; no masses,  no organomegaly  Extremities: extremities normal, atraumatic, no cyanosis or edema  Lymphatic: No significant lymph node enlargement papable  Neurologic: Mental status: Alert, oriented, thought content appropriate      Assessment & Plan:    Hypoglycemia- cont iv fluids with D5  PT/OT  Hopefully home soon    Sofie Thrasher MD

## 2021-09-18 NOTE — PROGRESS NOTES
[facility]        Hospitalist Progress Note  9/18/2021 1:40 PM  Subjective:   Admit Date: 9/17/2021  PCP: Nadir Quinonez MD, MD    Chief Complaint: Weight loss    Subjective: Patient was seen and examined by the bedside this morning. He has no new complaint. He requested to eat and wanted to know why he was losing so much weight. Cumulative Hospital History: Patient is a 51-year-old  male with medical history significant for colon cancer, DM type II, hypertension who was brought into the ED by EMS on account of hypoglycemia following administration of insulin. On admission patient was noted to be hypokalemic, hyponatremic, hypoalbuminemic. ROS: 14 point review of systems is negative except as specifically addressed above. ADULT DIET;  Regular; 3 carb choices (45 gm/meal)    Intake/Output Summary (Last 24 hours) at 9/18/2021 1340  Last data filed at 9/18/2021 0700  Gross per 24 hour   Intake 0 ml   Output --   Net 0 ml     Medications:   dextrose      dextrose 5 % and 0.45 % NaCl 50 mL/hr at 09/18/21 0609    sodium chloride       Current Facility-Administered Medications   Medication Dose Route Frequency Provider Last Rate Last Admin    glucose (GLUTOSE) 40 % oral gel 15 g  15 g Oral PRN [place] Ades, APRN        dextrose 50 % IV solution  12.5 g IntraVENous PRN [place] Ades, APRN        glucagon (rDNA) injection 1 mg  1 mg IntraMUSCular PRN [place] Ades, APRN        dextrose 5 % solution  100 mL/hr IntraVENous PRN [place] Ades, APRN        folic acid (FOLVITE) tablet 1 mg  1 mg Oral Daily [place] Ades, APRN   1 mg at 09/18/21 1026    prochlorperazine (COMPAZINE) tablet 10 mg  10 mg Oral Q6H PRN [place] Ades, APRN        [Held by provider] apixaban [facility]) tablet 5 mg  5 mg Oral BID [place] Ades, APRN        famotidine (PEPCID) injection 20 mg  20 mg IntraVENous BID [place] Ades, APRN   20 mg at 09/18/21 1026    albuterol (PROVENTIL) nebulizer solution 2.5 mg  2.5 mg Nebulization Q6H PRN ZHEN Phillips        dextrose 5 % and 0.45 % sodium chloride infusion   IntraVENous Continuous Tigre Portillo MD 50 mL/hr at 09/18/21 0609 Rate Change at 09/18/21 0743    sodium chloride flush 0.9 % injection 5-40 mL  5-40 mL IntraVENous 2 times per day Tigre Portillo MD        sodium chloride flush 0.9 % injection 5-40 mL  5-40 mL IntraVENous PRN Tigre Portillo MD        0.9 % sodium chloride infusion  25 mL IntraVENous PRN Tigre Portillo MD        ondansetron (ZOFRAN-ODT) disintegrating tablet 4 mg  4 mg Oral Q8H PRN Tigre Portillo MD        Or    ondansetron [facility]) injection 4 mg  4 mg IntraVENous Q6H PRN Tigre Portillo MD        polyethylene glycol [facility]) packet 17 g  17 g Oral Daily PRN Tigre Portillo MD        acetaminophen (TYLENOL) tablet 650 mg  650 mg Oral Q6H PRN Tigre Portillo MD   650 mg at 09/18/21 2063    Or    acetaminophen (TYLENOL) suppository 650 mg  650 mg Rectal Q6H PRN Tigre Portillo MD        enoxaparin (LOVENOX) injection 40 mg  40 mg SubCUTAneous Daily Tigre oPrtillo MD   40 mg at 09/18/21 1026        Labs:     Recent Labs     09/17/21  1901 09/18/21  0336   WBC 6.3 5.3   RBC 4.77 5.09   HGB 13.2* 13.9*   HCT 41.6* 44.6   MCV 87.2 87.6   MCH 27.7 27.3   MCHC 31.7* 31.2*    208     Recent Labs     09/17/21  1805 09/18/21  0336   * 132*   K 3.2* 3.7   ANIONGAP 9 11   CL 92* 92*   CO2 32* 29   BUN 12 10   CREATININE 0.6 0.4*   GLUCOSE 84 120*   CALCIUM 8.7 8.3*     Recent Labs     09/17/21  1805 09/18/21  0336   MG 2.0 1.7     Recent Labs     09/17/21  1805   AST 36   ALT 18   BILITOT 0.7   ALKPHOS 164*     ABGs:No results for input(s): PH, PO2, PCO2, HCO3, BE, O2SAT in the last 72 hours.   Troponin T:   Recent Labs     09/17/21  1805   TROPONINI <0.01     INR:   Recent Labs     09/17/21  1805   INR 1.30*     Lactic Acid:   Recent Labs     09/17/21  1805   LACTA 1.5       Objective:   Vitals: BP 96/69   Pulse 96   Temp 95.9 °F (35.5 °C) (Temporal)   Resp 18   Ht 6' 1\" (1.854 m)   Wt 170 lb (77.1 kg)   SpO2 98%   BMI 22.43 kg/m²   24HR INTAKE/OUTPUT:      Intake/Output Summary (Last 24 hours) at 9/18/2021 1340  Last data filed at 9/18/2021 0700  Gross per 24 hour   Intake 0 ml   Output --   Net 0 ml     General appearance: Middle-aged  male seen lying down in no acute cardiopulmonary distress. Alert and cooperative with exam  HEENT: atraumatic, eyes with clear conjunctiva and normal lids, pupils and irises normal, external ears and nose are normal, lips normal  Lungs: no increased work of breathing, CTA B, no wheezing or crackles. Heart: RRR, S1-S2 heard, no murmurs rubs or gallops. Abdomen: Healed surgical scar. Soft, nondistended, nontender. No guarding. Extremities: No cyanosis or edema. Peripheral pulses palpable  Neurologic: no focal neurologic deficits, normal sensation, alert and oriented, affect and mood appropriate  Skin: no rashes, nodules      Assessment and Plan:   Principal Problem:    Hypoglycemia after GI (gastrointestinal) surgery  Active Problems:    Primary colon cancer with metastasis to other site ([facility] [address]    Diabetes ([facility] [address]    Palliative care patient    Essential hypertension    Hyperlipidemia  Resolved Problems:    * No resolved hospital problems. *    Plan:  #Hypoglycemia, resolved  -Likely due to insulin administration  -Patient received D50 EMS around the ED  -Patient continued on D5W.   Cumulative flow  -Continue regular Accu-Chek, SSI and hypoglycemic protocol    #Generalized weakness  -Likely secondary to malignancy  -PT OT  -Palliative care consult     #Hypokalemia, resolved    #Hyponatremia  -Likely due to hypervolemia  -Continue hydration with IV fluids    Other comorbidities-: Continue home meds  Advance Directive: DNR-CC    DVT prophylaxis: Patient on apixaban  Discharge planning: TBD    Signed:  Crystal Juarez MD 9/18/2021 1:40 PM  Rounding Hospitalist

## 2021-09-19 ENCOUNTER — APPOINTMENT (OUTPATIENT)
Dept: CT IMAGING | Age: 61
DRG: 637 | End: 2021-09-19
Payer: MEDICARE

## 2021-09-19 PROBLEM — G93.40 ACUTE ENCEPHALOPATHY: Status: ACTIVE | Noted: 2021-09-19

## 2021-09-19 LAB
ANION GAP SERPL CALCULATED.3IONS-SCNC: 6 MMOL/L (ref 7–19)
BASOPHILS ABSOLUTE: 0 K/UL (ref 0–0.2)
BASOPHILS RELATIVE PERCENT: 0.8 % (ref 0–1)
BUN BLDV-MCNC: 8 MG/DL (ref 6–20)
CALCIUM SERPL-MCNC: 8.3 MG/DL (ref 8.6–10)
CHLORIDE BLD-SCNC: 94 MMOL/L (ref 98–111)
CO2: 34 MMOL/L (ref 22–29)
CREAT SERPL-MCNC: 0.7 MG/DL (ref 0.5–1.2)
EOSINOPHILS ABSOLUTE: 0.1 K/UL (ref 0–0.6)
EOSINOPHILS RELATIVE PERCENT: 2.4 % (ref 0–5)
GFR AFRICAN AMERICAN: >59
GFR NON-AFRICAN AMERICAN: >60
GLUCOSE BLD-MCNC: 101 MG/DL (ref 70–99)
GLUCOSE BLD-MCNC: 105 MG/DL (ref 70–99)
GLUCOSE BLD-MCNC: 110 MG/DL (ref 74–109)
GLUCOSE BLD-MCNC: 90 MG/DL (ref 70–99)
HCT VFR BLD CALC: 42.6 % (ref 42–52)
HEMOGLOBIN: 13.3 G/DL (ref 14–18)
IMMATURE GRANULOCYTES #: 0 K/UL
LYMPHOCYTES ABSOLUTE: 0.4 K/UL (ref 1.1–4.5)
LYMPHOCYTES RELATIVE PERCENT: 8.9 % (ref 20–40)
MCH RBC QN AUTO: 27.7 PG (ref 27–31)
MCHC RBC AUTO-ENTMCNC: 31.2 G/DL (ref 33–37)
MCV RBC AUTO: 88.6 FL (ref 80–94)
MONOCYTES ABSOLUTE: 0.5 K/UL (ref 0–0.9)
MONOCYTES RELATIVE PERCENT: 9.3 % (ref 0–10)
NEUTROPHILS ABSOLUTE: 3.9 K/UL (ref 1.5–7.5)
NEUTROPHILS RELATIVE PERCENT: 78.4 % (ref 50–65)
PDW BLD-RTO: 17.4 % (ref 11.5–14.5)
PERFORMED ON: ABNORMAL
PERFORMED ON: ABNORMAL
PERFORMED ON: NORMAL
PLATELET # BLD: 211 K/UL (ref 130–400)
PMV BLD AUTO: 8.8 FL (ref 9.4–12.4)
POTASSIUM REFLEX MAGNESIUM: 3.9 MMOL/L (ref 3.5–5)
RBC # BLD: 4.81 M/UL (ref 4.7–6.1)
SODIUM BLD-SCNC: 134 MMOL/L (ref 136–145)
WBC # BLD: 4.9 K/UL (ref 4.8–10.8)

## 2021-09-19 PROCEDURE — 6360000004 HC RX CONTRAST MEDICATION: Performed by: STUDENT IN AN ORGANIZED HEALTH CARE EDUCATION/TRAINING PROGRAM

## 2021-09-19 PROCEDURE — 82947 ASSAY GLUCOSE BLOOD QUANT: CPT

## 2021-09-19 PROCEDURE — 85025 COMPLETE CBC W/AUTO DIFF WBC: CPT

## 2021-09-19 PROCEDURE — 2500000003 HC RX 250 WO HCPCS: Performed by: NURSE PRACTITIONER

## 2021-09-19 PROCEDURE — 6370000000 HC RX 637 (ALT 250 FOR IP): Performed by: NURSE PRACTITIONER

## 2021-09-19 PROCEDURE — 2580000003 HC RX 258: Performed by: HOSPITALIST

## 2021-09-19 PROCEDURE — 1210000000 HC MED SURG R&B

## 2021-09-19 PROCEDURE — 96376 TX/PRO/DX INJ SAME DRUG ADON: CPT

## 2021-09-19 PROCEDURE — 71260 CT THORAX DX C+: CPT

## 2021-09-19 PROCEDURE — 80048 BASIC METABOLIC PNL TOTAL CA: CPT

## 2021-09-19 PROCEDURE — 36415 COLL VENOUS BLD VENIPUNCTURE: CPT

## 2021-09-19 PROCEDURE — 6370000000 HC RX 637 (ALT 250 FOR IP): Performed by: HOSPITALIST

## 2021-09-19 RX ADMIN — FOLIC ACID 1 MG: 1 TABLET ORAL at 11:00

## 2021-09-19 RX ADMIN — FAMOTIDINE 20 MG: 10 INJECTION, SOLUTION INTRAVENOUS at 11:00

## 2021-09-19 RX ADMIN — ACETAMINOPHEN 650 MG: 325 TABLET ORAL at 14:35

## 2021-09-19 RX ADMIN — IOPAMIDOL 90 ML: 755 INJECTION, SOLUTION INTRAVENOUS at 10:29

## 2021-09-19 RX ADMIN — APIXABAN 5 MG: 5 TABLET, FILM COATED ORAL at 22:24

## 2021-09-19 RX ADMIN — SODIUM CHLORIDE, PRESERVATIVE FREE 10 ML: 5 INJECTION INTRAVENOUS at 22:24

## 2021-09-19 RX ADMIN — FAMOTIDINE 20 MG: 10 INJECTION, SOLUTION INTRAVENOUS at 22:24

## 2021-09-19 ASSESSMENT — PAIN SCALES - GENERAL: PAINLEVEL_OUTOF10: 5

## 2021-09-19 NOTE — PROGRESS NOTES
[facility]        Hospitalist Progress Note  9/19/2021 11:00 AM  Subjective:   Admit Date: 9/17/2021  PCP: Roberto Osorio MD, MD    Chief Complaint: Weight loss    Subjective: Patient was seen and examined by the bedside this morning. He has no new complaint. He endorsed reduced shortness of breath, fatigue. Patient is to have CT chest this morning for review of collapsed lung. Cumulative Hospital History: Patient is a 61-year-old  male with medical history significant for colon cancer, DM type II, hypertension who was brought into the ED by EMS on account of hypoglycemia following administration of insulin. On admission patient was noted to be hypokalemic, hyponatremic, hypoalbuminemic. ROS: 14 point review of systems is negative except as specifically addressed above. ADULT DIET;  Regular; 3 carb choices (45 gm/meal)    Intake/Output Summary (Last 24 hours) at 9/19/2021 1100  Last data filed at 9/19/2021 0523  Gross per 24 hour   Intake 3444 ml   Output 900 ml   Net 2544 ml     Medications:   dextrose      dextrose 5 % and 0.45 % NaCl 50 mL/hr at 09/18/21 1712    sodium chloride       Current Facility-Administered Medications   Medication Dose Route Frequency Provider Last Rate Last Admin    glucose (GLUTOSE) 40 % oral gel 15 g  15 g Oral PRN ZHEN Abreu        dextrose 50 % IV solution  12.5 g IntraVENous PRN ZHEN Abreu        glucagon (rDNA) injection 1 mg  1 mg IntraMUSCular PRN ZHEN Abreu        dextrose 5 % solution  100 mL/hr IntraVENous PRN ZHEN Abreu        folic acid (FOLVITE) tablet 1 mg  1 mg Oral Daily ZHEN Abreu   1 mg at 09/18/21 1026    prochlorperazine (COMPAZINE) tablet 10 mg  10 mg Oral Q6H PRN ZHEN Abreu        apixaban Yarely Alu) tablet 5 mg  5 mg Oral BID ZHEN Abreu        famotidine (PEPCID) injection 20 mg  20 mg IntraVENous BID ZHEN Abreu 20 mg at 09/18/21 2149    albuterol (PROVENTIL) nebulizer solution 2.5 mg  2.5 mg Nebulization Q6H PRN ZHEN Gtz        dextrose 5 % and 0.45 % sodium chloride infusion   IntraVENous Continuous Milana Odonnell MD 50 mL/hr at 09/18/21 1712 New Bag at 09/18/21 1712    sodium chloride flush 0.9 % injection 5-40 mL  5-40 mL IntraVENous 2 times per day Milana Odonnell MD   10 mL at 09/18/21 2149    sodium chloride flush 0.9 % injection 5-40 mL  5-40 mL IntraVENous PRN Milana Odonnell MD        0.9 % sodium chloride infusion  25 mL IntraVENous PRN Milana Odonnell MD        ondansetron (ZOFRAN-ODT) disintegrating tablet 4 mg  4 mg Oral Q8H PRN Milana Odonnell MD        Or    ondansetron [facility]) injection 4 mg  4 mg IntraVENous Q6H PRN Milana Odonnell MD        polyethylene glycol [facility]) packet 17 g  17 g Oral Daily PRN Milana Odonnell MD        acetaminophen (TYLENOL) tablet 650 mg  650 mg Oral Q6H PRN Milana Odonnell MD   650 mg at 09/18/21 2149    Or    acetaminophen (TYLENOL) suppository 650 mg  650 mg Rectal Q6H PRN Milana Odonnell MD            Labs:     Recent Labs     09/17/21  1901 09/18/21  0336 09/19/21  1015   WBC 6.3 5.3 4.9   RBC 4.77 5.09 4.81   HGB 13.2* 13.9* 13.3*   HCT 41.6* 44.6 42.6   MCV 87.2 87.6 88.6   MCH 27.7 27.3 27.7   MCHC 31.7* 31.2* 31.2*    208 211     Recent Labs     09/17/21  1805 09/18/21  0336 09/19/21  1015   * 132* 134*   K 3.2* 3.7 3.9   ANIONGAP 9 11 6*   CL 92* 92* 94*   CO2 32* 29 34*   BUN 12 10 8   CREATININE 0.6 0.4* 0.7   GLUCOSE 84 120* 110*   CALCIUM 8.7 8.3* 8.3*     Recent Labs     09/17/21  1805 09/18/21  0336   MG 2.0 1.7     Recent Labs     09/17/21  1805   AST 36   ALT 18   BILITOT 0.7   ALKPHOS 164*     ABGs:No results for input(s): PH, PO2, PCO2, HCO3, BE, O2SAT in the last 72 hours.   Troponin T:   Recent Labs     09/17/21 1805   TROPONINI <0.01     INR: Recent Labs     09/17/21  1805   INR 1.30*     Lactic Acid:   Recent Labs     09/17/21  1805   LACTA 1.5       Objective:   Vitals: BP 94/75   Pulse 100   Temp 98.1 °F (36.7 °C)   Resp 18   Ht 6' 1\" (1.854 m)   Wt 170 lb (77.1 kg)   SpO2 100%   BMI 22.43 kg/m²   24HR INTAKE/OUTPUT:      Intake/Output Summary (Last 24 hours) at 9/19/2021 1100  Last data filed at 9/19/2021 0523  Gross per 24 hour   Intake 3444 ml   Output 900 ml   Net 2544 ml     General appearance: Pleasant  male seen lying down in no acute cardiopulmonary distress. Alert and cooperative with exam  HEENT: atraumatic, eyes with clear conjunctiva and normal lids, pupils and irises normal, external ears and nose are normal, lips normal  Lungs: Reduced air entry bilaterally, worse on the right  Heart: RRR, S1-S2 heard, no murmurs rubs or gallops. Abdomen: Healed surgical scar. Soft, nondistended, nontender. No guarding. Extremities: No cyanosis or edema. Peripheral pulses palpable  Neurologic: no focal neurologic deficits, normal sensation, alert and oriented, affect and mood appropriate  Skin: no rashes, nodules      Assessment and Plan:   Principal Problem:    Hypoglycemia after GI (gastrointestinal) surgery  Active Problems:    Primary colon cancer with metastasis to other site ([facility] [address]    Diabetes ([facility] [address]    Palliative care patient    Essential hypertension    Hyperlipidemia  Resolved Problems:    * No resolved hospital problems.  *    Plan:  Colon cancer with metastasis to the lungs  -[facility] [address] [facility] pain meds  -Continue outpatient follow-up with [facility] oncology    #Hypoglycemia, resolved  -Likely due to insulin administration  -Patient received D50 EMS around the ED  -DC D5W  -Continue regular Accu-Chek, SSI and hypoglycemic protocol    #Generalized weakness  -Likely secondary to malignancy  -PT OT as tolerated  -Palliative care consult     #Hypokalemia, resolved    #Hyponatremia, improved    Other comorbidities-: Continue home meds    Advance Directive: DNR-CC    DVT prophylaxis: Patient on apixaban  Discharge planning: TBD    Signed:  Jolie Soto MD 9/19/2021 11:00 AM  Rounding Hospitalist

## 2021-09-20 LAB
ANION GAP SERPL CALCULATED.3IONS-SCNC: 10 MMOL/L (ref 7–19)
BASOPHILS ABSOLUTE: 0 K/UL (ref 0–0.2)
BASOPHILS RELATIVE PERCENT: 0.9 % (ref 0–1)
BUN BLDV-MCNC: 7 MG/DL (ref 6–20)
CALCIUM SERPL-MCNC: 8.2 MG/DL (ref 8.6–10)
CHLORIDE BLD-SCNC: 96 MMOL/L (ref 98–111)
CO2: 30 MMOL/L (ref 22–29)
CREAT SERPL-MCNC: 0.5 MG/DL (ref 0.5–1.2)
EOSINOPHILS ABSOLUTE: 0.2 K/UL (ref 0–0.6)
EOSINOPHILS RELATIVE PERCENT: 3.3 % (ref 0–5)
GFR AFRICAN AMERICAN: >59
GFR NON-AFRICAN AMERICAN: >60
GLUCOSE BLD-MCNC: 103 MG/DL (ref 70–99)
GLUCOSE BLD-MCNC: 115 MG/DL (ref 70–99)
GLUCOSE BLD-MCNC: 92 MG/DL (ref 74–109)
GLUCOSE BLD-MCNC: 93 MG/DL (ref 70–99)
GLUCOSE BLD-MCNC: 94 MG/DL (ref 70–99)
HCT VFR BLD CALC: 40.5 % (ref 42–52)
HEMOGLOBIN: 12.5 G/DL (ref 14–18)
IMMATURE GRANULOCYTES #: 0 K/UL
LYMPHOCYTES ABSOLUTE: 0.5 K/UL (ref 1.1–4.5)
LYMPHOCYTES RELATIVE PERCENT: 10.2 % (ref 20–40)
MCH RBC QN AUTO: 27.2 PG (ref 27–31)
MCHC RBC AUTO-ENTMCNC: 30.9 G/DL (ref 33–37)
MCV RBC AUTO: 88.2 FL (ref 80–94)
MONOCYTES ABSOLUTE: 0.5 K/UL (ref 0–0.9)
MONOCYTES RELATIVE PERCENT: 10.4 % (ref 0–10)
NEUTROPHILS ABSOLUTE: 3.4 K/UL (ref 1.5–7.5)
NEUTROPHILS RELATIVE PERCENT: 74.5 % (ref 50–65)
PDW BLD-RTO: 17.3 % (ref 11.5–14.5)
PERFORMED ON: ABNORMAL
PERFORMED ON: ABNORMAL
PERFORMED ON: NORMAL
PERFORMED ON: NORMAL
PLATELET # BLD: 205 K/UL (ref 130–400)
PMV BLD AUTO: 9.2 FL (ref 9.4–12.4)
POTASSIUM REFLEX MAGNESIUM: 3.6 MMOL/L (ref 3.5–5)
RBC # BLD: 4.59 M/UL (ref 4.7–6.1)
SODIUM BLD-SCNC: 136 MMOL/L (ref 136–145)
WBC # BLD: 4.6 K/UL (ref 4.8–10.8)

## 2021-09-20 PROCEDURE — 97535 SELF CARE MNGMENT TRAINING: CPT

## 2021-09-20 PROCEDURE — 1210000000 HC MED SURG R&B

## 2021-09-20 PROCEDURE — 97165 OT EVAL LOW COMPLEX 30 MIN: CPT

## 2021-09-20 PROCEDURE — 99222 1ST HOSP IP/OBS MODERATE 55: CPT | Performed by: PHYSICIAN ASSISTANT

## 2021-09-20 PROCEDURE — 97161 PT EVAL LOW COMPLEX 20 MIN: CPT

## 2021-09-20 PROCEDURE — 97116 GAIT TRAINING THERAPY: CPT

## 2021-09-20 PROCEDURE — 36415 COLL VENOUS BLD VENIPUNCTURE: CPT

## 2021-09-20 PROCEDURE — 85025 COMPLETE CBC W/AUTO DIFF WBC: CPT

## 2021-09-20 PROCEDURE — 2580000003 HC RX 258: Performed by: HOSPITALIST

## 2021-09-20 PROCEDURE — 6370000000 HC RX 637 (ALT 250 FOR IP): Performed by: HOSPITALIST

## 2021-09-20 PROCEDURE — 80048 BASIC METABOLIC PNL TOTAL CA: CPT

## 2021-09-20 PROCEDURE — 2700000000 HC OXYGEN THERAPY PER DAY

## 2021-09-20 PROCEDURE — 82947 ASSAY GLUCOSE BLOOD QUANT: CPT

## 2021-09-20 PROCEDURE — 6370000000 HC RX 637 (ALT 250 FOR IP): Performed by: NURSE PRACTITIONER

## 2021-09-20 PROCEDURE — 6370000000 HC RX 637 (ALT 250 FOR IP): Performed by: STUDENT IN AN ORGANIZED HEALTH CARE EDUCATION/TRAINING PROGRAM

## 2021-09-20 PROCEDURE — 2500000003 HC RX 250 WO HCPCS: Performed by: NURSE PRACTITIONER

## 2021-09-20 RX ORDER — CYCLOBENZAPRINE HCL 10 MG
5 TABLET ORAL 3 TIMES DAILY PRN
Status: DISCONTINUED | OUTPATIENT
Start: 2021-09-20 | End: 2021-09-21 | Stop reason: HOSPADM

## 2021-09-20 RX ORDER — CYCLOBENZAPRINE HCL 5 MG
5 TABLET ORAL 3 TIMES DAILY PRN
Qty: 15 TABLET | Refills: 0 | Status: SHIPPED | OUTPATIENT
Start: 2021-09-20 | End: 2021-09-30

## 2021-09-20 RX ADMIN — FAMOTIDINE 20 MG: 10 INJECTION, SOLUTION INTRAVENOUS at 09:35

## 2021-09-20 RX ADMIN — SODIUM CHLORIDE, PRESERVATIVE FREE 10 ML: 5 INJECTION INTRAVENOUS at 20:01

## 2021-09-20 RX ADMIN — FAMOTIDINE 20 MG: 10 INJECTION, SOLUTION INTRAVENOUS at 20:01

## 2021-09-20 RX ADMIN — FOLIC ACID 1 MG: 1 TABLET ORAL at 09:35

## 2021-09-20 RX ADMIN — SODIUM CHLORIDE, PRESERVATIVE FREE 10 ML: 5 INJECTION INTRAVENOUS at 09:35

## 2021-09-20 RX ADMIN — APIXABAN 5 MG: 5 TABLET, FILM COATED ORAL at 09:35

## 2021-09-20 RX ADMIN — CYCLOBENZAPRINE 5 MG: 10 TABLET, FILM COATED ORAL at 20:01

## 2021-09-20 RX ADMIN — APIXABAN 5 MG: 5 TABLET, FILM COATED ORAL at 20:01

## 2021-09-20 RX ADMIN — ACETAMINOPHEN 650 MG: 325 TABLET ORAL at 15:09

## 2021-09-20 ASSESSMENT — PAIN DESCRIPTION - PROGRESSION
CLINICAL_PROGRESSION: GRADUALLY WORSENING
CLINICAL_PROGRESSION: GRADUALLY WORSENING

## 2021-09-20 ASSESSMENT — PAIN DESCRIPTION - FREQUENCY
FREQUENCY: CONTINUOUS
FREQUENCY: CONTINUOUS

## 2021-09-20 ASSESSMENT — PAIN DESCRIPTION - PAIN TYPE: TYPE: CHRONIC PAIN

## 2021-09-20 ASSESSMENT — PAIN DESCRIPTION - LOCATION
LOCATION: NECK

## 2021-09-20 ASSESSMENT — PAIN DESCRIPTION - DESCRIPTORS
DESCRIPTORS: ACHING;DISCOMFORT
DESCRIPTORS: ACHING;DISCOMFORT
DESCRIPTORS: DISCOMFORT;ACHING;DULL

## 2021-09-20 ASSESSMENT — PAIN - FUNCTIONAL ASSESSMENT
PAIN_FUNCTIONAL_ASSESSMENT: PREVENTS OR INTERFERES SOME ACTIVE ACTIVITIES AND ADLS
PAIN_FUNCTIONAL_ASSESSMENT: PREVENTS OR INTERFERES SOME ACTIVE ACTIVITIES AND ADLS
PAIN_FUNCTIONAL_ASSESSMENT: ACTIVITIES ARE NOT PREVENTED

## 2021-09-20 ASSESSMENT — PAIN SCALES - GENERAL
PAINLEVEL_OUTOF10: 8
PAINLEVEL_OUTOF10: 3
PAINLEVEL_OUTOF10: 6
PAINLEVEL_OUTOF10: 3

## 2021-09-20 ASSESSMENT — PAIN DESCRIPTION - ONSET: ONSET: ON-GOING

## 2021-09-20 NOTE — CONSULTS
Palliative Care Consult Note    9/20/2021 1:42 PM  Subjective:  Admit Date: 9/17/2021  PCP: Cuca Mcnally MD, MD    Date of Service: 9/20/2021    Reason for Consultation:  Goals of Care, Code Status, Family Support     History Obtained From: EMR/Patient and their Family    History Of Present Illness: The patient is a 63 y.o. male with PMH colon cancer, DM II, HTN, HLD, pulmonary embolism 05/2019 who presented to [address] [place] [address] ED on 09/17/2021 due to hypoglycemia. Mr. Scott Parish was assisted out of bed by home health on day of admission and became unresponsive. He was found to be hypoglycemic and brought to ED for further evaluation and treatment. He received D10 with improvement in hypoglycemia. CT head and cervical spine were unremarkable. Mr. Scott Parish does have history of a right perihilar mass. CXR revealed suspected lung collapse on the right. He is dependent on 2.5 L O2 at baseline. Patient was admitted to Hospitalist service and was continued on IV fluids. Palliative care was consulted for code status discussion, family support. Mr. Scott Parish is followed by [facility] for known colonic adenocarcinoma with metastasis to liver, right upper/left lower lobe, retroperitoneal node and adrenal glands bilaterally. Patient's last office visit with Dr. Era Elise was 06/09/2021. At that time recommendations were to continue cycle #4 palliative chemotherapy with FOLFIRI. Patient was initially seen by Palliative service on 06/01/2021. During that hospitalization patient was given recommendations to pursue palliative radiation to reduce tumor burden in right hilar area. Mr. Scott Parish states he has completed radiation therapy at this time. He has not had follow up with Dr. Era Elise.      Past Medical History:        Diagnosis Date    Cancer [facility])     colon    Diabetes mellitus ([facility] [address]     Essential hypertension 5/6/2019    Hyperlipidemia 7/26/2017    Last Assessment & Plan:  Formatting of this note might be different from the original. On Lipitor    Palliative care patient 06/01/2021     Past Surgical History:        Procedure Laterality Date    ABDOMEN SURGERY      COLONOSCOPY      ENDOSCOPY, COLON, DIAGNOSTIC      TUNNELED VENOUS PORT PLACEMENT       Home Medications:  Prior to Admission medications    Medication Sig Start Date End Date Taking? Authorizing Provider   cyclobenzaprine (FLEXERIL) 5 MG tablet Take 1 tablet by mouth 3 times daily as needed for Muscle spasms 9/20/21 9/30/21 Yes Eriberto Kolb MD   hydroCHLOROthiazide (HYDRODIURIL) 25 MG tablet Take 25 mg by mouth daily 3/1/21  Yes Historical Provider, MD   HYDROcodone-acetaminophen (NORCO) 5-325 MG per tablet Take 1 tablet by mouth every 4 hours as needed. 6/3/21  Yes Historical Provider, MD   mirtazapine (REMERON) 7.5 MG tablet Take 7.5 mg by mouth 8/25/21 11/24/21 Yes Historical Provider, MD   ipratropium-albuterol (DUONEB) 0.5-2.5 (3) MG/3ML SOLN nebulizer solution Inhale 3 mLs into the lungs every 4 hours (while awake) 6/3/21 7/3/21  Oumar Bach MD   folic acid (FOLVITE) 1 MG tablet Take 1 tablet by mouth daily 6/4/21 7/4/21  Oumar Bach MD   Nebulizers (COMPRESSOR/NEBULIZER) MISC 1 vial by Does not apply route 4 times daily 6/3/21 9/1/21  Oumar Bach MD   prochlorperazine (COMPAZINE) 10 MG tablet Take 10 mg by mouth every 6 hours as needed    Historical Provider, MD   albuterol sulfate HFA (VENTOLIN HFA) 108 (90 Base) MCG/ACT inhaler Inhale 2 puffs into the lungs every 6 hours as needed for Wheezing    Historical Provider, MD   apixaban (ELIQUIS) 5 MG TABS tablet Take by mouth 2 times daily    Historical Provider, MD     Allergies:    Varenicline    Social History:    The patient currently lives at home. Tobacco:   reports that he has quit smoking. His smoking use included cigarettes. He has a 40.00 pack-year smoking history. He has never used smokeless tobacco.  Alcohol:   reports previous alcohol use.   Illicit Drugs: denies    Family History:      Problem Relation Age of Onset    Heart Attack Father     Heart Attack Maternal Grandfather     Heart Attack Paternal Grandfather      Review of Systems:   Constitutional / general:  Denies fever / chills / sweats +fatigue +weight loss  Head:  Denies headache / neck stiffness / trauma / visual change  Eyes:  Denies blurry vision / acute visual change or loss / itching / redness  ENT: Denies sore throat / hoarseness / nasal drainage / ear pain  CV:  Denies chest pain / palpitations/ orthopnea   Respiratory:  Denies cough / shortness of breath / sputum / hemoptysis  GI: Denies nausea / vomiting / abdominal pain / diarrhea / constipation +decreased oral intake  :  Denies dysuria / hesitancy / urgency / hematuria   Neuro: Denies paralysis / syncope / seizure / dysphagia / headache / paresthesias  Musculoskeletal: +muscle weakness /joint stiffness / pain  Vascular: Denies edema / claudication / varicosities  Heme / endocrine: Denies easy bruising / bleeding / excessive sweating / heat or cold intolerance  Psychiatric:  Denies depression / anxiety / insomnia / mood changes  Skin:  Denies new rashes / lesions / skin hair or nail changes    14 point review of systems is negative except as specifically addressed above. Physical Examination:  /73   Pulse 95   Temp 96.3 °F (35.7 °C) (Temporal)   Resp 16   Ht 6' 1\" (1.854 m)   Wt 170 lb (77.1 kg)   SpO2 99%   BMI 22.43 kg/m²   General appearance: 62 yo male, chronically ill appearing, no acute distress   Head: Normocephalic, without obvious abnormality, atraumatic, bilateral temporal atrophy  Eyes: conjunctivae/corneas clear. PERRL, EOM's intact.    Ears: normal external ears and nose, throat without exudate  Neck:  no carotid bruit, no JVD, supple, symmetrical, trachea midline   Lungs:decreased air entry throughout, much worse on right side, no wheezing or rhonchi  Heart: regular rate and rhythm, S1, S2 normal, no murmur  Abdomen:soft, non-tender; non-distended, normal bowel sounds no masses, no organomegaly  Extremities:No lower extremity edema,  No erythema, no tenderness to palpation, muscular atrophy throughout  Skin: Skin color, texture, turgor normal. No rashes or lesions  Lymphatic: No palpable lymph node enlargment  Neurologic: Alert and oriented X 3, generalized weakness, normal tone. No focal deficits  Psychiatric: Calm, easily agitated     Diagnostic Data:  CBC:  Recent Labs     09/18/21  0336 09/19/21  1015 09/20/21  0332   WBC 5.3 4.9 4.6*   HGB 13.9* 13.3* 12.5*   HCT 44.6 42.6 40.5*    211 205     BMP:  Recent Labs     09/18/21  0336 09/19/21  1015 09/20/21  0332   * 134* 136   K 3.7 3.9 3.6   CL 92* 94* 96*   CO2 29 34* 30*   BUN 10 8 7   CREATININE 0.4* 0.7 0.5   CALCIUM 8.3* 8.3* 8.2*     Recent Labs     09/17/21  1805   AST 36   ALT 18   BILITOT 0.7   ALKPHOS 164*     Coag Panel:   Recent Labs     09/17/21  1805   INR 1.30*   PROTIME 16.3*     Cardiac Enzymes:   Recent Labs     09/17/21  1805   TROPONINI <0.01     CT HEAD WO CONTRAST  CT head. No acute intracranial abnormalities. CT cervical spine. 1.  No acute osseous posttraumatic findings. 2.  Complete opacification of the visualized right upper lobe. Signed by Dr Bea Hugo  CT head. No acute intracranial abnormalities. CT cervical spine. 1.  No acute osseous posttraumatic findings. 2.  Complete opacification of the visualized right upper lobe. Signed by Dr Koby Amezcua    XR CImpression: Complete opacification of the right hemithorax, suspected Lung collapse given the history of prior right perihilar mass. Signed by Dr Koby Amezcua    CT chest 09/19/2021  1. Progression of neoplastic disease compared with 4 months ago. 2. Right hilar mass produces right bronchial obstruction with complete  consolidation/collapse of the right lung. Drowned lung appearance with  moderate right pleural effusion.     Palliative Performance Scale:  [] 80% Full ambulation  Some disease: Normal activity w/ some effort  Full self-care  Normal/reduced intake  Full LOC  [] 70% Reduced ambulation  Some disease: Can't do normal job or work  Full self-care  Normal/reduced intake  Full LOC  [] 60% Reduced ambulation  Significant disease: Can't do hobbies/housework  Occasional assistance  Normal/reduced intake  LOC full/confusion  [] 50% Mainly sit/lie  Extensive disease: Can't do any work  Considerable assistance  Normal/reduced intake  LOC full/confusion  [x] 40% Mainly in bed  Extensive disease  Mainly assistance  Normal/reduced intake  LOC full/confusion  [] 30% Bed Bound  Extensive disease  Total care  Reduced Intake  LOC full/confusion  [] 20% Bed Bound  Extensive disease  Total care  Minimal intake  Drowsy/coma  [] 10% Bed Bound  Extensive disease  Total care  Mouth care only  Drowsy/coma  [] 0% Death    Palliative Review of Advance Directives:     Surrogate Decision Maker:Yes-Elier jimenez    Durable Power of : Yes    Advanced Directives/Living Morales: Yes    Out of hospital medical orders in place to reflect resuscitation status (MOLST/POLST): No    Information Sharing:  Patient's awareness of illness:  [] Terminal [] Life-Threatening [x] Serious [] Non life-threatening [] Not serious   [] Not discussed    Family awareness of illness:   [] Terminal [] Life-Threatening [x] Serious [] Nonlife-threatening [] Not serious   [] Not discussed    Assessment/Plan:  Principal Problem:    Hypoglycemia after GI (gastrointestinal) surgery  Active Problems:    Primary colon cancer with metastasis to other site ([facility] [address]    Diabetes ([facility] [address]    Palliative care patient    Essential hypertension    Hyperlipidemia    Acute encephalopathy  Resolved Problems:    * No resolved hospital problems. *     Visit Summary:  Chart reviewed, patient discussed with consulting service and nursing staff.  Reviewed health issues, work up and treatment plan as well as factors that lead to hospitalization. I saw Mr. Salud Restrepo at his bedside with his son present in the room. We discussed hospital course, treatment provided thus far and recommendation to stop insulin at home and monitor glucose closely. Additionally we discussed concern for progression of metastatic disease on CT chest. Mr. Salud Restrepo states he has completed radiation treatment but has not followed up with Dr. Margarito Fernando since 06/09/2021. He was also referred at that time to outpatient palliative care and refused services. A progress note on 06/29/2021 appears patient missed his chemotherapy appointment and had a follow up scheduled for 07/07/2021 but did not present for that appointment either. I spoke with patient's RN and plans are for him to discharge today. If patient does not discharge home would recommend Oncology consultation. I reached out to Mr. Reinaldo Schwartz daughter, who is also his POA. We discussed concern on CT scan. I urged the importance of follow up with Oncology to Mr. Salud Restrepo as well as his daughter. We discussed need to discuss treatment options as well as consideration for outpatient palliative care or hospice services. All questions answered. Support provided. Recommendations:     1. Palliative Care-[facility] DC home w/ HH. Code status: DNR. SCOP referral offered, patient refused at this time. He understands option for hospice care as well as need for follow up with Oncology. 2. Metastatic colon cancer-w/ concern for disease progression on CT scan chest. D/w RN importance of making sure patient has follow up scheduled with Dr. Margarito Fernando   3. Hypoglycemia-no A1C available, suspect patient no longer requires insulin. Recommend to monitor glucose closely at home. Thank you for consulting palliative care and allowing us to participate in the care of the patient.     CounselingTopics: Goals of care, Code Status, Disease process education, pt/family support    Time Spent Counseling > 50%:  YES                                   Total Time Spent with patient/family counseling, workup/treatment review, counseling and placement of orders/preparation of this note: 60 minutes    Electronically signed by Charmayne Chamorro, PA-C on 9/20/2021 at 1:42 PM    (Please note that portions of this note were completed with a voice recognition program.  Efforts were made to edit the dictations but occasionally words are mis-transcribed.)

## 2021-09-20 NOTE — DISCHARGE SUMMARY
Discharge Summary    NAME: Jasmyne Rehman  :  1960  MRN:  [id number]    Admit date:  2021  Discharge date:      Admitting Physician:  No admitting provider for patient encounter. Advance Directive: DNR-CC    Consults: none    Primary Care Physician:  Eddy Navarrete MD, MD    Discharge Diagnoses:  Principal Problem:    Hypoglycemia after GI (gastrointestinal) surgery  Active Problems:    Primary colon cancer with metastasis to other site [facility])    Diabetes [facility])    Palliative care patient    Essential hypertension    Hyperlipidemia    Acute encephalopathy  Resolved Problems:    * No resolved hospital problems. *      Significant Diagnostic Studies:   CT HEAD WO CONTRAST    Result Date: 2021  EXAM: CT OF THE HEAD WITHOUT IV CONTRAST CT CERVICAL SPINE WITHOUT IV CONTRAST 2021 COMPARISON: None. INDICATION: Headache, fall PROCEDURE: Non contrast enhanced head CT and cervical spine CT were performed. The head images were formatted in the axial plane at 5 mm thick intervals. The cervical spine images were formatted in the axial, coronal and sagittal planes. Radiation dose equals DLP 1426 mGy-cm. Automated exposure control dose reduction technique was implemented. FINDINGS: HEAD: Mild generalized volume loss. Mild chronic microvascular changes. . There is no evidence of mass-effect or midline shift. The gray-white differentiation is preserved. There is no evidence of intracranial contusion, hemorrhage, or skull fracture. Mastoid air cells are clear. Nonobstructive paranasal sinus mucosal disease. CERVICAL SPINE: The odontoid process is well approximated with the anterior body of C1 and well aligned between the lateral masses of C1. There is no spondylolisthesis. No acute compression deformity. No dislocation. Multilevel degenerative changes, with distances and facet arthropathy. Complete opacification of the visualized right upper lobe. CT head. No acute intracranial abnormalities.  CT cervical spine. 1.  No acute osseous posttraumatic findings. 2.  Complete opacification of the visualized right upper lobe. Signed by Dr Patel Del Rio    Result Date: 9/17/2021  EXAM: CT OF THE HEAD WITHOUT IV CONTRAST CT CERVICAL SPINE WITHOUT IV CONTRAST 9/17/2021 COMPARISON: None. INDICATION: Headache, fall PROCEDURE: Non contrast enhanced head CT and cervical spine CT were performed. The head images were formatted in the axial plane at 5 mm thick intervals. The cervical spine images were formatted in the axial, coronal and sagittal planes. Radiation dose equals DLP 1426 mGy-cm. Automated exposure control dose reduction technique was implemented. FINDINGS: HEAD: Mild generalized volume loss. Mild chronic microvascular changes. . There is no evidence of mass-effect or midline shift. The gray-white differentiation is preserved. There is no evidence of intracranial contusion, hemorrhage, or skull fracture. Mastoid air cells are clear. Nonobstructive paranasal sinus mucosal disease. CERVICAL SPINE: The odontoid process is well approximated with the anterior body of C1 and well aligned between the lateral masses of C1. There is no spondylolisthesis. No acute compression deformity. No dislocation. Multilevel degenerative changes, with distances and facet arthropathy. Complete opacification of the visualized right upper lobe. CT head. No acute intracranial abnormalities. CT cervical spine. 1.  No acute osseous posttraumatic findings. 2.  Complete opacification of the visualized right upper lobe. Signed by Dr Lorri Quintero    XR CHEST PORTABLE    Result Date: 9/17/2021  Exam:   XR CHEST PORTABLE  Date:  9/17/2021 History:  Male, age  63 years; fall COMPARISON:  Chest x-ray dated May 30, 2021. Findings : Right-sided chest port in place. The right heart is indistinct. There is mediastinal shift to the right.  Complete opacification of the right hemithorax suspected to reflect right lung collapse. .  The bones show no acute pathology. Impression: Complete opacification of the right hemithorax, suspected Lung collapse given the history of prior right perihilar mass. Signed by Dr Julian Che      Pertinent Labs:   CBC:   Recent Labs     09/18/21  0336 09/19/21  1015 09/20/21  0332   WBC 5.3 4.9 4.6*   HGB 13.9* 13.3* 12.5*    211 205     BMP:    Recent Labs     09/18/21  0336 09/19/21  1015 09/20/21  0332   * 134* 136   K 3.7 3.9 3.6   CL 92* 94* 96*   CO2 29 34* 30*   BUN 10 8 7   CREATININE 0.4* 0.7 0.5   GLUCOSE 120* 110* 92     INR:   Recent Labs     09/17/21  1805   INR 1.30*     Lipids: No results for input(s): CHOL, HDL in the last 72 hours. Invalid input(s): LDLCALCU  ABGs:No results for input(s): PHART, HZP6LWW, PO2ART, DIF2RWN, BEART, HGBAE, R8KWLVDX, CARBOXHGBART, 02THERAPY in the last 72 hours. HgBA1c:  No results for input(s): LABA1C in the last 72 hours. Procedures:   Hospital Course:   Patient is a 79-year-old  male with medical history significant for colon cancer, DM type II, hypertension who was brought into the ED by EMS on account of hypoglycemia following administration of insulin. On admission patient was noted to be hypokalemic, hyponatremic, hypoalbuminemic. Insulin glargine was discontinued. Patient was started on D5W, regular Accu-Cheks and hypoglycemic protocol. He also received potassium replacement and oxygen supplementation via nasal cannula. CT of the chest was reported to show progression of neoplastic disease. Patient had no further episodes of hypoglycemia while on admission. Patient is deemed stable and is advised to follow-up with his PCP for review of his diabetes medication. He is also advised to follow-up with his oncologist for review of his worsening metastatic disease.     Physical Exam:  Vital Signs: /73   Pulse 95   Temp 96.3 °F (35.7 °C) (Temporal)   Resp 16   Ht 6' 1\" (1.854 m)   Wt 170 lb (77.1 kg) Lloyd Ybarra MD, MD in 7 days. Take medications as directed. Resume activity as tolerated. Diet: ADULT DIET; Regular; 3 carb choices (45 gm/meal)     Disposition: Patient is medically stable and will be discharged home with home health.     Time spent on discharge-37 minutes    Signed:  Carmella Sinclair MD  9/20/2021 11:03 AM

## 2021-09-20 NOTE — PROGRESS NOTES
Physical Therapy    Facility/Department: [facility] SURG SERVICES  Initial Assessment    NAME: Samantha Munguia  : 1960  MRN: [id number]    Date of Service: 2021    Discharge Recommendations:  Continue to assess pending progress, Patient would benefit from continued therapy after discharge (WILL CONTINUE TO ASSESS FOR SAFE D/C DISPOSITION)   PT Equipment Recommendations  Other: ASSESSING. MAY NEED A W/C OR TRANSPORT CHAIR FOR USE AT HOME    Assessment   Body structures, Functions, Activity limitations: Decreased functional mobility ; Decreased endurance;Decreased strength;Decreased safe awareness;Decreased posture  Assessment: pt NOTED TO BE MILDLY IMPULSIVE WITH DEC AWARENESS OF SAFETY. Pt'S MAIN ISSUE WITH MOBILITY AT THIS TIME APPEARS TO BE REALTED TO SUBJECTIVE REPORT OF DIZZINESS. IF pt WAS TO D/C HOME AT HIS CURRENT STATE, HE WOULD REQUIRE ASSIST / FOR MOBILITY/ADL'S AND USE OF THE GT BELT. MAY REQUIRE A W/C IF WEAKNESS CONTINUES  Decision Making: Low Complexity  PT Education: Goals;PT Role;Plan of Care; Functional Mobility Training;Gait Training;General Safety; Family Education  REQUIRES PT FOLLOW UP: Yes  Activity Tolerance  Activity Tolerance: Patient Tolerated treatment well;Patient limited by endurance  Activity Tolerance: LIMITED BY DIZZINESS       Patient Diagnosis(es): The primary encounter diagnosis was Hypoglycemia. Diagnoses of Failure to thrive in adult, History of lung cancer, History of colon cancer, Syncope, unspecified syncope type, Generalized weakness, and Hypokalemia were also pertinent to this visit. has a past medical history of Cancer ([facility] [address] Diabetes mellitus ([facility] [address] Essential hypertension, Hyperlipidemia, and Palliative care patient. has a past surgical history that includes Tunneled venous port placement; Abdomen surgery; Colonoscopy; and Endoscopy, colon, diagnostic.     Restrictions  Restrictions/Precautions  Restrictions/Precautions: Fall Risk (2 L O2)  Required Braces or Orthoses?: No  Vision/Hearing        Subjective  General  Patient assessed for rehabilitation services?: Yes  Diagnosis: HYPOGLYCEMIA, COLON CA  Other (Comment): [place] AND REQUIRES REPEAT INSTRUCTIONS. General Comment  Comments: pt WEARING 02. STATES HE WEARS AT HOME ALSO  Subjective  Subjective: pt C/O DIZZINESS WHEN ATTEMPTING TO AMBULATE.  RN NOTIFIED  Pain Screening  Patient Currently in Pain: Yes  Pain Assessment  Pain Assessment: 0-10  Pain Level: 8  Pain Location: Neck  Pain Descriptors: Aching;Discomfort  Pain Frequency: Continuous  Clinical Progression: Gradually worsening  Functional Pain Assessment: Activities are not prevented  Non-Pharmaceutical Pain Intervention(s): Repositioned  Response to Pain Intervention: Patient Satisfied  Vital Signs  Patient Currently in Pain: Yes  Pre Treatment Pain Screening  Intervention List: Patient able to continue with treatment;Nurse called to administer meds    Orientation     Social/Functional History  Social/Functional History  Lives With: Daughter  Type of Home: House  Home Layout: One level  Home Access: Stairs to enter with rails  Entrance Stairs - Number of Steps: 5  Bathroom Shower/Tub: Tub/Shower unit  Home Equipment: Rolling walker  ADL Assistance: Needs assistance  Homemaking Responsibilities: No  Ambulation Assistance: Needs assistance  Transfer Assistance: Needs assistance  Additional Comments: Pt STATES HE HAS REQUIRED INCREASED ASSIST WITH ADL'S AND MOBILITY IN THE LAST 2 MONTHS  Cognition        Objective     Observation/Palpation  Observation: WEARING O2. HAS A DIAPER IN PLACE    AROM RLE (degrees)  RLE AROM: WFL  AROM LLE (degrees)  LLE AROM : WFL  Strength Other  Other: ABLE TO STAND AND SUPPORT WEIGHT WELL THRU LE'S        Bed mobility  Supine to Sit: Stand by assistance  Sit to Supine: Stand by assistance  Transfers  Sit to Stand: Contact guard assistance  Stand to sit: Contact guard assistance  Bed to Chair: Contact guard assistance  Ambulation  Ambulation?: Yes  Ambulation 1  Device: Rolling Walker  Assistance: Contact guard assistance  Quality of Gait: SLOW, GUARDED, FLEXED POSTURE  Gait Deviations: Decreased step length;Decreased step height  Distance: 4 FT. pt WAS AMB TOWARD WINDWOW FROM BED WHEN HE BEGAN TO BACK UP AND HAD TO SIT IN RECLINER DUE TO REPORT OF DIZZINESS  Comments: ALL BP MACHINES WERE IN USE BUT DISCUSSED WITH RN AND ADVISED Pt TO HAVE ORTHOSTATIC BP TAKEN              Plan   Plan  Times per week: 5-7  Plan weeks: 2  Current Treatment Recommendations: Strengthening, Balance Training, Functional Mobility Training, Transfer Training, Gait Training, Safety Education & Training  Plan Comment: CONTINUE TO PROGRESS MOBILITY.  MONITOR FOR DIZZINESS, S/S OF SYNCOPE. 02  Safety Devices  Type of devices: Call light within reach, Gait belt, Left in bed, Bed alarm in place         Goals  Short term goals  Time Frame for Short term goals: 2 WKS  Short term goal 1:  FT WITH RW SBA       Therapy Time   Individual Concurrent Group Co-treatment   Time In           Time Out           Minutes                   James Gee PT    Electronically signed by James Gee PT on 9/20/2021 at 11:11 AM

## 2021-09-20 NOTE — PROGRESS NOTES
Physician Progress Note      Geneva Marte  CSN #:                  [id number]  :                       1960  ADMIT DATE:       2021 4:52 PM  DISCH DATE:  RESPONDING  PROVIDER #:        Too Howard          QUERY TEXT:    Pt admitted with hypoglycemia and suspected lung collapse. Pt noted to be   oxygen dependent at 2.5lL NC. If possible, please document in the progress   notes and discharge summary if you are evaluating and/or treating any of the   following: The medical record reflects the following:  Risk Factors: Lung cancer, suspected lung collapse  Clinical Indicators: Oxygen dependent at 2.5L NC. Treatment: Oxygen at 2.5L NC baseline. Options provided:  -- Chronic respiratory failure with hypoxia  -- Chronic respiratory failure with hypercapnia  -- Chronic respiratory failure with hypoxia and hypercapnia  -- Other - I will add my own diagnosis  -- Disagree - Not applicable / Not valid  -- Disagree - Clinically unable to determine / Unknown  -- Refer to Clinical Documentation Reviewer    PROVIDER RESPONSE TEXT:    This patient has chronic respiratory failure with hypoxia.     Query created by: Joe Estrada on 2021 10:41 AM      Electronically signed by:  Too Howard 2021 11:25 AM

## 2021-09-20 NOTE — DISCHARGE INSTR - DIET

## 2021-09-20 NOTE — PROGRESS NOTES
Occupational Therapy Initial Assessment  Date: 2021   Patient Name: Jasmyne Rehman  MRN: [id number]     : 1960    Date of Service: 2021    Discharge Recommendations:  Continue to assess pending progress. Assessment   Assessment: OT evaluation completed and tx initiated. Pt may benefit from continued OT services to address functional decline. Pt may improve in his deficits with further tx. Will continue to asses for D/C disposition/recommendations. REQUIRES OT FOLLOW UP: Yes  Activity Tolerance  Activity Tolerance: Patient Tolerated treatment well  Activity Tolerance: Pt was agreeable to tx but required breaks throughout. Safety Devices  Safety Devices in place: Yes  Type of devices: Nurse notified; Left in chair;Chair alarm in place;Call light within reach           Patient Diagnosis(es): The primary encounter diagnosis was Hypoglycemia. Diagnoses of Failure to thrive in adult, History of lung cancer, History of colon cancer, Syncope, unspecified syncope type, Generalized weakness, and Hypokalemia were also pertinent to this visit. has a past medical history of Cancer ([facility] [address] Diabetes mellitus ([facility] [address] Essential hypertension, Hyperlipidemia, and Palliative care patient. has a past surgical history that includes Tunneled venous port placement; Abdomen surgery; Colonoscopy; and Endoscopy, colon, diagnostic.        Restrictions  Restrictions/Precautions  Restrictions/Precautions: Fall Risk (2 L O2)  Required Braces or Orthoses?: No    Subjective   General  Chart Reviewed: Yes  Patient assessed for rehabilitation services?: Yes  Additional Pertinent Hx: DM; HTN; colon CA; palliative care pt; lung CA  Family / Caregiver Present: Yes (daughter)  Diagnosis: Hypoglycemia post-GI surgery; recent fall  Patient Currently in Pain: Yes  Pain Assessment  Pain Assessment: 0-10  Pain Level: 6 (Alerted nursing about pt's request for meds)  Pain Location: Neck  Pain Descriptors: Aching;Discomfort  Pain Frequency: Continuous  Clinical Progression: Gradually worsening  Functional Pain Assessment: Prevents or interferes some active activities and ADLs  Non-Pharmaceutical Pain Intervention(s): Ambulation/Increased Activity;Repositioned  Response to Pain Intervention: None  Vital Signs  Patient Currently in Pain: Yes  Social/Functional History  Social/Functional History  Lives With: Daughter  Type of Home: House  Home Layout: One level  Home Access: Stairs to enter with rails  Entrance Stairs - Number of Steps: 5  Bathroom Shower/Tub: Tub/Shower unit  Home Equipment: Rolling walker  ADL Assistance: Needs assistance  Homemaking Responsibilities: No  Ambulation Assistance: Needs assistance  Transfer Assistance: Needs assistance  Additional Comments: Pt STATES HE HAS REQUIRED INCREASED ASSIST WITH ADL'S AND MOBILITY IN THE LAST 2 MONTHS     Objective   Vision: Impaired  Vision Exceptions: Wears glasses at all times  Hearing: Exceptions to [facility]  Hearing Exceptions: Hard of hearing/hearing concerns       Observation/Palpation  Observation: WEARING O2. HAS A DIAPER IN PLACE  Functional Mobility  Functional - Mobility Device: Rolling Walker  Assist Level: Minimal assistance  Functional Mobility Comments: Pt sat suddenly during functional mobility. Toilet Transfers  Toilet - Technique: Stand step  Equipment Used: Standard bedside commode  Toilet Transfer: Minimal assistance  Toilet Transfers Comments: with r/w or use of bedrails depending upon distance  ADL  Feeding: Independent;Setup  Grooming: Independent;Setup  UE Bathing: Moderate assistance  LE Bathing: Maximum assistance  UE Dressing: Minimal assistance  LE Dressing: Moderate assistance;Maximum assistance  Toileting: Maximum assistance  Additional Comments: Pt's current pain and lightheadedness impact ADL scoring.         Bed mobility  Supine to Sit: Stand by assistance  Sit to Supine: Stand by assistance  Comment: HOB elevated and bedrails used  Transfers  Stand Step Transfers: Independent     Cognition  Overall Cognitive Status: Exceptions  Arousal/Alertness: Delayed responses to stimuli  Following Commands: Follows one step commands with repetition  Attention Span: Attends with cues to redirect  Problem Solving: Decreased awareness of errors  Insights: Decreased awareness of deficits  Initiation: Requires cues for some               LUE AROM (degrees)  LUE AROM : WFL  RUE AROM (degrees)  RUE AROM : WFL                 Tx Initiated: Tx consisted of transfer training, endurance-building therapeutic activities, pt/family education, and bed mobility (total time: 15 min). Plan   Plan  Times per week: 2-3  Current Treatment Recommendations: Strengthening, Pain Management, Positioning, ROM, Safety Education & Training, Balance Training, Patient/Caregiver Education & Training, Self-Care / ADL, Cognitive/Perceptual Training, Functional Mobility Training, Home Management Training, Equipment Evaluation, Education, & procurement, Endurance Training, Cognitive Reorientation    Goals  Short term goals  Time Frame for Short term goals: 1 week  Short term goal 1: Perform light, ambulatory ADLs with supervision and PRN DME. Short term goal 2: Complete transfers with supervision and PRN DME. Short term goal 3: Perform ADL task in sitting independently for 10 min. Short term goal 4: Complete toileting skills with min A and PRN DME/AE. Long term goals  Long term goal 1: Progress as tolerated.      Therapy Time   Individual Concurrent Group Co-treatment   Time In           Time Out           Minutes                 Tammi Schilder, OTR/L   Electronically signed by Tammi Schilder, OTR/L on 9/20/2021 at 11:57 AM

## 2021-09-20 NOTE — CARE COORDINATION
Patient is current with [facility] for Nursing, PT, OT Services. Will follow. Please advise when patient discharges. WILL NEED RESUMPTION OF CARE ORDERS TO RESUME HH SERVICES WHEN PATIENT DISCHARGES. Thank you. [address] [place] [address] [phone]. [phone].     Mamie Mcfarland RN, Home Care Liaison  [phone] P  Electronically signed by Mamie Mcfarland on 9/20/2021 at 9:43 AM

## 2021-09-21 ENCOUNTER — TELEPHONE (OUTPATIENT)
Dept: HEMATOLOGY | Age: 61
End: 2021-09-21

## 2021-09-21 VITALS
HEART RATE: 97 BPM | RESPIRATION RATE: 20 BRPM | WEIGHT: 170 LBS | SYSTOLIC BLOOD PRESSURE: 97 MMHG | OXYGEN SATURATION: 100 % | TEMPERATURE: 96.1 F | BODY MASS INDEX: 22.53 KG/M2 | HEIGHT: 73 IN | DIASTOLIC BLOOD PRESSURE: 76 MMHG

## 2021-09-21 DIAGNOSIS — C18.9 PRIMARY COLON CANCER WITH METASTASIS TO OTHER SITE (HCC): ICD-10-CM

## 2021-09-21 LAB
ANION GAP SERPL CALCULATED.3IONS-SCNC: 13 MMOL/L (ref 7–19)
BASOPHILS ABSOLUTE: 0.1 K/UL (ref 0–0.2)
BASOPHILS RELATIVE PERCENT: 1.1 % (ref 0–1)
BUN BLDV-MCNC: 6 MG/DL (ref 6–20)
CALCIUM SERPL-MCNC: 8.3 MG/DL (ref 8.6–10)
CHLORIDE BLD-SCNC: 97 MMOL/L (ref 98–111)
CO2: 28 MMOL/L (ref 22–29)
CREAT SERPL-MCNC: 0.6 MG/DL (ref 0.5–1.2)
EKG P AXIS: 49 DEGREES
EKG P-R INTERVAL: 148 MS
EKG Q-T INTERVAL: 432 MS
EKG QRS DURATION: 106 MS
EKG QTC CALCULATION (BAZETT): 465 MS
EKG T AXIS: 159 DEGREES
EOSINOPHILS ABSOLUTE: 0.1 K/UL (ref 0–0.6)
EOSINOPHILS RELATIVE PERCENT: 3.2 % (ref 0–5)
GFR AFRICAN AMERICAN: >59
GFR NON-AFRICAN AMERICAN: >60
GLUCOSE BLD-MCNC: 103 MG/DL (ref 70–99)
GLUCOSE BLD-MCNC: 103 MG/DL (ref 74–109)
GLUCOSE BLD-MCNC: 128 MG/DL (ref 70–99)
HCT VFR BLD CALC: 40.9 % (ref 42–52)
HEMOGLOBIN: 12.8 G/DL (ref 14–18)
IMMATURE GRANULOCYTES #: 0 K/UL
LYMPHOCYTES ABSOLUTE: 0.3 K/UL (ref 1.1–4.5)
LYMPHOCYTES RELATIVE PERCENT: 7.7 % (ref 20–40)
MCH RBC QN AUTO: 28 PG (ref 27–31)
MCHC RBC AUTO-ENTMCNC: 31.3 G/DL (ref 33–37)
MCV RBC AUTO: 89.5 FL (ref 80–94)
MONOCYTES ABSOLUTE: 0.4 K/UL (ref 0–0.9)
MONOCYTES RELATIVE PERCENT: 8.9 % (ref 0–10)
NEUTROPHILS ABSOLUTE: 3.5 K/UL (ref 1.5–7.5)
NEUTROPHILS RELATIVE PERCENT: 78.6 % (ref 50–65)
PDW BLD-RTO: 17.1 % (ref 11.5–14.5)
PERFORMED ON: ABNORMAL
PERFORMED ON: ABNORMAL
PLATELET # BLD: 185 K/UL (ref 130–400)
PMV BLD AUTO: 8.6 FL (ref 9.4–12.4)
POTASSIUM REFLEX MAGNESIUM: 3.7 MMOL/L (ref 3.5–5)
RBC # BLD: 4.57 M/UL (ref 4.7–6.1)
SODIUM BLD-SCNC: 138 MMOL/L (ref 136–145)
WBC # BLD: 4.4 K/UL (ref 4.8–10.8)

## 2021-09-21 PROCEDURE — 99231 SBSQ HOSP IP/OBS SF/LOW 25: CPT | Performed by: PHYSICIAN ASSISTANT

## 2021-09-21 PROCEDURE — 36415 COLL VENOUS BLD VENIPUNCTURE: CPT

## 2021-09-21 PROCEDURE — 6370000000 HC RX 637 (ALT 250 FOR IP): Performed by: NURSE PRACTITIONER

## 2021-09-21 PROCEDURE — 2700000000 HC OXYGEN THERAPY PER DAY

## 2021-09-21 PROCEDURE — 80048 BASIC METABOLIC PNL TOTAL CA: CPT

## 2021-09-21 PROCEDURE — 85025 COMPLETE CBC W/AUTO DIFF WBC: CPT

## 2021-09-21 PROCEDURE — 2500000003 HC RX 250 WO HCPCS: Performed by: NURSE PRACTITIONER

## 2021-09-21 PROCEDURE — 82947 ASSAY GLUCOSE BLOOD QUANT: CPT

## 2021-09-21 RX ADMIN — APIXABAN 5 MG: 5 TABLET, FILM COATED ORAL at 07:47

## 2021-09-21 RX ADMIN — FOLIC ACID 1 MG: 1 TABLET ORAL at 07:47

## 2021-09-21 RX ADMIN — FAMOTIDINE 20 MG: 10 INJECTION, SOLUTION INTRAVENOUS at 07:47

## 2021-09-21 NOTE — PROGRESS NOTES
Physical Therapy  Name: Sil Ulrich  MRN:  [id number]  Date of service:  9/21/2021 09/21/21 0834   Subjective   Subjective Attempt: Pt reported he was tired, not wanting to get out of bed at this time.          Electronically signed by David Hurst PTA on 9/21/2021 at 8:35 AM

## 2021-09-21 NOTE — DISCHARGE SUMMARY
Discharge Summary    NAME: Nirmal Rubio  :  1960  MRN:  [id number]    Admit date:  2021  Discharge date:      Admitting Physician: Dr Mora Martin Directive: DNR-CC    Consults: none    Primary Care Physician:  José Antonio Corey MD, MD    Discharge Diagnoses:  Principal Problem:    Hypoglycemia after GI (gastrointestinal) surgery  Active Problems:    Primary colon cancer with metastasis to other site [facility])    Diabetes [facility])    Palliative care patient    Essential hypertension    Hyperlipidemia    Acute encephalopathy  Resolved Problems:    * No resolved hospital problems. *      Significant Diagnostic Studies:   CT HEAD WO CONTRAST    Result Date: 2021  EXAM: CT OF THE HEAD WITHOUT IV CONTRAST CT CERVICAL SPINE WITHOUT IV CONTRAST 2021 COMPARISON: None. INDICATION: Headache, fall PROCEDURE: Non contrast enhanced head CT and cervical spine CT were performed. The head images were formatted in the axial plane at 5 mm thick intervals. The cervical spine images were formatted in the axial, coronal and sagittal planes. Radiation dose equals DLP 1426 mGy-cm. Automated exposure control dose reduction technique was implemented. FINDINGS: HEAD: Mild generalized volume loss. Mild chronic microvascular changes. . There is no evidence of mass-effect or midline shift. The gray-white differentiation is preserved. There is no evidence of intracranial contusion, hemorrhage, or skull fracture. Mastoid air cells are clear. Nonobstructive paranasal sinus mucosal disease. CERVICAL SPINE: The odontoid process is well approximated with the anterior body of C1 and well aligned between the lateral masses of C1. There is no spondylolisthesis. No acute compression deformity. No dislocation. Multilevel degenerative changes, with distances and facet arthropathy. Complete opacification of the visualized right upper lobe. CT head. No acute intracranial abnormalities. CT cervical spine.  1.  No acute osseous posttraumatic findings. 2.  Complete opacification of the visualized right upper lobe. Signed by Dr Faye Guerin    Result Date: 9/17/2021  EXAM: CT OF THE HEAD WITHOUT IV CONTRAST CT CERVICAL SPINE WITHOUT IV CONTRAST 9/17/2021 COMPARISON: None. INDICATION: Headache, fall PROCEDURE: Non contrast enhanced head CT and cervical spine CT were performed. The head images were formatted in the axial plane at 5 mm thick intervals. The cervical spine images were formatted in the axial, coronal and sagittal planes. Radiation dose equals DLP 1426 mGy-cm. Automated exposure control dose reduction technique was implemented. FINDINGS: HEAD: Mild generalized volume loss. Mild chronic microvascular changes. . There is no evidence of mass-effect or midline shift. The gray-white differentiation is preserved. There is no evidence of intracranial contusion, hemorrhage, or skull fracture. Mastoid air cells are clear. Nonobstructive paranasal sinus mucosal disease. CERVICAL SPINE: The odontoid process is well approximated with the anterior body of C1 and well aligned between the lateral masses of C1. There is no spondylolisthesis. No acute compression deformity. No dislocation. Multilevel degenerative changes, with distances and facet arthropathy. Complete opacification of the visualized right upper lobe. CT head. No acute intracranial abnormalities. CT cervical spine. 1.  No acute osseous posttraumatic findings. 2.  Complete opacification of the visualized right upper lobe. Signed by Dr Vern Rock    XR CHEST PORTABLE    Result Date: 9/17/2021  Exam:   XR CHEST PORTABLE  Date:  9/17/2021 History:  Male, age  63 years; fall COMPARISON:  Chest x-ray dated May 30, 2021. Findings : Right-sided chest port in place. The right heart is indistinct. There is mediastinal shift to the right. Complete opacification of the right hemithorax suspected to reflect right lung collapse. .  The bones show no acute pathology. Impression: Complete opacification of the right hemithorax, suspected Lung collapse given the history of prior right perihilar mass. Signed by Dr Elza Tobin      Pertinent Labs:   CBC:   Recent Labs     09/19/21  1015 09/20/21  0332 09/21/21  0416   WBC 4.9 4.6* 4.4*   HGB 13.3* 12.5* 12.8*    205 185     BMP:    Recent Labs     09/19/21  1015 09/20/21  0332 09/21/21  0416   * 136 138   K 3.9 3.6 3.7   CL 94* 96* 97*   CO2 34* 30* 28   BUN 8 7 6   CREATININE 0.7 0.5 0.6   GLUCOSE 110* 92 103     INR:   No results for input(s): INR in the last 72 hours. Lipids: No results for input(s): CHOL, HDL in the last 72 hours. Invalid input(s): LDLCALCU  ABGs:No results for input(s): PHART, MLR8ZEW, PO2ART, EKL9VZI, BEART, HGBAE, J8WMBVIH, CARBOXHGBART, 02THERAPY in the last 72 hours. HgBA1c:  No results for input(s): LABA1C in the last 72 hours. Procedures:   Hospital Course:   Patient is a 72-year-old  male with medical history significant for colon cancer, DM type II, hypertension who was brought into the ED by EMS on account of hypoglycemia following administration of insulin. On admission patient was noted to be hypokalemic, hyponatremic, hypoalbuminemic. Insulin glargine was discontinued. Patient was started on D5W, regular Accu-Cheks and hypoglycemic protocol. He also received potassium replacement and oxygen supplementation via nasal cannula. CT of the chest was reported to show progression of neoplastic disease. Patient had no further episodes of hypoglycemia while on admission. Patient is deemed stable and is advised to follow-up with his PCP for review of his diabetes medication. He is also advised to follow-up with his oncologist for review of his worsening metastatic disease. Patient was to be discharged yesterday however he complained of 2 episodes of loose watery stool overnight. Discharge was held.   Stool studies were requested however patient had no more bowel movements after the others were placed. Patient remained stable and is being discharged as previously planned. Physical Exam:  Vital Signs: BP 97/76   Pulse 97   Temp 96.1 °F (35.6 °C) (Temporal)   Resp 20   Ht 6' 1\" (1.854 m)   Wt 170 lb (77.1 kg)   SpO2 100%   BMI 22.43 kg/m²      General appearance: Pleasant middle-aged  male seen lying down in no acute cardiopulmonary distress. Alert and cooperative with exam  HEENT: atraumatic, eyes with clear conjunctiva and normal lids, pupils and irises normal, external ears and nose are normal, lips normal  Lungs: Reduced air entry bilaterally, worse on the right  Heart: RRR, S1-S2 heard, no murmurs rubs or gallops. Abdomen: Healed surgical scar. Soft, nondistended, nontender. No guarding. Extremities: No cyanosis or edema. Peripheral pulses palpable  Neurologic: no focal neurologic deficits, normal sensation, alert and oriented, affect and mood appropriate  Skin: no rashes, nodules    Discharge Medications:       Divya Porras   Home Medication Instructions OCF:[id number]    Printed on:09/21/21 5110   Medication Information                      albuterol sulfate HFA (VENTOLIN HFA) 108 (90 Base) MCG/ACT inhaler  Inhale 2 puffs into the lungs every 6 hours as needed for Wheezing             apixaban (ELIQUIS) 5 MG TABS tablet  Take by mouth 2 times daily             cyclobenzaprine (FLEXERIL) 5 MG tablet  Take 1 tablet by mouth 3 times daily as needed for Muscle spasms             folic acid (FOLVITE) 1 MG tablet  Take 1 tablet by mouth daily             hydroCHLOROthiazide (HYDRODIURIL) 25 MG tablet  Take 25 mg by mouth daily             HYDROcodone-acetaminophen (NORCO) 5-325 MG per tablet  Take 1 tablet by mouth every 4 hours as needed.              ipratropium-albuterol (DUONEB) 0.5-2.5 (3) MG/3ML SOLN nebulizer solution  Inhale 3 mLs into the lungs every 4 hours (while awake)             mirtazapine (REMERON) 7.5 MG tablet  Take 7.5 mg by mouth             Nebulizers (COMPRESSOR/NEBULIZER) MISC  1 vial by Does not apply route 4 times daily             prochlorperazine (COMPAZINE) 10 MG tablet  Take 10 mg by mouth every 6 hours as needed                 Discharge Instructions: Follow up with Akash Lares MD, MD in 7 days. Take medications as directed. Resume activity as tolerated. Diet: ADULT DIET; Regular; 3 carb choices (45 gm/meal)     Disposition: Patient is medically stable and will be discharged home with home health.     Time spent on discharge-37 minutes    Signed:  Miguel Ángel Williamson MD  9/21/2021 1:38 PM

## 2021-09-21 NOTE — PROGRESS NOTES
Palliative Care Progress Note  9/21/2021 3:44 PM    Patient:  Baljinder Milian  YOB: 1960  Primary Care Physician: Nadia Camargo MD, MD  Advance Directive: [facility]  Admit Date: 9/17/2021       Hospital Day: 2  Portions of this note have been copied forward, however, changed to reflect the most current clinical status of this patient. CHIEF COMPLAINT/REASON FOR CONSULTATION Goals of care, code status, family support    SUBJECTIVE:  Mr. Tisha Carey has no new complaints. Tells me he wants to discharge home as soon as possible. Interval History: Patient to be discharged 09/20/2021, however, developed diarrhea which has now resolved. Oncology was consulted. Review of Systems:   14 point review of systems is negative except as specifically addressed above. Objective:   VITALS:  BP 97/76   Pulse 97   Temp 96.1 °F (35.6 °C) (Temporal)   Resp 20   Ht 6' 1\" (1.854 m)   Wt 170 lb (77.1 kg)   SpO2 100%   BMI 22.43 kg/m²   24HR INTAKE/OUTPUT:      Intake/Output Summary (Last 24 hours) at 9/21/2021 1544  Last data filed at 9/21/2021 1230  Gross per 24 hour   Intake 1149 ml   Output --   Net 1149 ml     General appearance: 62 yo cachectic, resting comfortably in bed, no acute distress   Head: Normocephalic, without obvious abnormality, atraumatic, bilateral temporal atrophy  Eyes: conjunctivae/corneas clear. PERRL, EOM's intact.    Ears: normal external ears and nose, throat without exudate  Neck: no adenopathy, no carotid bruit, no JVD, supple, symmetrical, trachea midline   Lungs: decreased air entry to bilateral bases otherwise clear to auscultation bilaterally,no rales or wheezes   Heart: regular rate and rhythm, S1, S2 normal, no murmur  Abdomen:soft, non-tender; non-distended, bowel sounds present    Extremities:No lower extremity edema,  No erythema, no tenderness to palpation  Skin: Skin color, texture, turgor normal. No rashes or lesions  Lymphatic: No palpable lymph node enlargment  Neurologic: Alert and oriented X 3, generalized weakness normal tone. No focal deficits  Psychiatric: easily Agitated, poor insight, flat affect    Medications:      dextrose      dextrose 5 % and 0.45 % NaCl 50 mL/hr at 09/18/21 1712    sodium chloride        folic acid  1 mg Oral Daily    apixaban  5 mg Oral BID    famotidine (PEPCID) injection  20 mg IntraVENous BID    sodium chloride flush  5-40 mL IntraVENous 2 times per day     cyclobenzaprine, glucose, dextrose, glucagon (rDNA), dextrose, prochlorperazine, albuterol, sodium chloride flush, sodium chloride, ondansetron **OR** ondansetron, polyethylene glycol, acetaminophen **OR** acetaminophen  ADULT DIET; Regular; 3 carb choices (45 gm/meal)     Lab and other Data:     Recent Labs     09/19/21  1015 09/20/21  0332 09/21/21  0416   WBC 4.9 4.6* 4.4*   HGB 13.3* 12.5* 12.8*    205 185     Recent Labs     09/19/21  1015 09/20/21  0332 09/21/21  0416   * 136 138   K 3.9 3.6 3.7   CL 94* 96* 97*   CO2 34* 30* 28   BUN 8 7 6   CREATININE 0.7 0.5 0.6   GLUCOSE 110* 92 103       Assessment/Plan   Principal Problem:    Hypoglycemia after GI (gastrointestinal) surgery  Active Problems:    Primary colon cancer with metastasis to other site ([facility] [address]    Diabetes ([facility] [address]    Palliative care patient    Essential hypertension    Hyperlipidemia    Acute encephalopathy  Resolved Problems:    * No resolved hospital problems. *    Visit Summary:  Chart reviewed, patient discussed with consulting service and nursing staff. Reviewed health issues, work up and treatment plan as well as factors that lead to hospitalization. I saw Mr. Collette App at his bedside for follow up to our conversation yesterday regarding goals of care, need for follow up with Oncology. He tells me he plans to continue any treatment recommended by Oncology once his strength has improved.  Attempted to discuss options regarding home health, palliative care and hospice, however, patient states he does not need \"any of those things\" at this time. Offered to visit, follow up when he is willing to have further discussion, however, he denies needs at this time. Support offered. Recommendations:   1. Palliative care: [address] home w/ HH. Code status: DNR Patient refuses outpatient palliative or consideration for hospice services. Expressed the importance of follow up with Oncology \    2. Metastatic colon cancer w/ concern for progression of disease-Oncology consulted     3. Hypoglycemia-resolving    4. Diarrhea-resolved     Thank you for consulting Palliative Care and allowing us to participate in the care of this patient.    Time Spent Counseling > 50%:  YES                                   Total Time Spent with patient/family counseling, workup/treatment review, counseling and placement of orders/preparation of this note: 16 minutes    Electronically signed by Priscila Banks PA-C on 9/21/2021 at 3:44 PM    (Please note that portions of this note were completed with a voice recognition program.  Dylan Page made to edit the dictations but occasionally words are mis-transcribed.)

## 2021-09-21 NOTE — TELEPHONE ENCOUNTER
[facility] 5TH FLOOR CONSULTING FOR PT. CONSULT DR GRACIA, ROOM 517. DX PROGRESSION OF NEOPLASTIC DISEASE. PHONE [phone].

## 2021-09-22 LAB
BLOOD CULTURE, ROUTINE: NORMAL
CULTURE, BLOOD 2: NORMAL

## 2021-09-22 NOTE — PROGRESS NOTES
Physician Progress Note      Dylon Mckinney  CSN #:                  [id number]  :                       1960  ADMIT DATE:       2021 4:52 PM  [address] [place] DATE:        2021 3:56 PM  RESPONDING  PROVIDER #:        Devin Rios        QUERY TEXT:    Type of Encephalopathy: Please provide further specificity, if known. Clinical indicators include: hypoglycemia, acute, encephalopathy,   hyponatremic, hypoglycemic, alcohol, alcohol use, fever, coma  Options provided:  -- Anoxic/hypoxic encephalopathy  -- Metabolic encephalopathy  -- Toxic encephalopathy  -- Hepatic encephalopathy  -- Hypertensive encephalopathy  -- Other - I will add my own diagnosis  -- Disagree - Not applicable / Not valid  -- Disagree - Clinically Unable to determine / Unknown        PROVIDER RESPONSE TEXT:    The patient has metabolic encephalopathy.       Electronically signed by:  Devin Rios 2021 8:25 PM

## 2021-09-29 ENCOUNTER — HOSPITAL ENCOUNTER (OUTPATIENT)
Dept: INFUSION THERAPY | Age: 61
End: 2021-09-29
Payer: MEDICARE

## 2021-09-29 ENCOUNTER — TELEMEDICINE (OUTPATIENT)
Dept: HEMATOLOGY | Age: 61
End: 2021-09-29
Payer: MEDICARE

## 2021-09-29 DIAGNOSIS — Z78.9 POOR PROGNOSIS: ICD-10-CM

## 2021-09-29 DIAGNOSIS — Z51.5 PALLIATIVE CARE PATIENT: ICD-10-CM

## 2021-09-29 DIAGNOSIS — Z71.89 COUNSELING REGARDING ADVANCED CARE PLANNING AND GOALS OF CARE: ICD-10-CM

## 2021-09-29 DIAGNOSIS — C18.9 PRIMARY COLON CANCER WITH METASTASIS TO OTHER SITE (HCC): Primary | ICD-10-CM

## 2021-09-29 PROCEDURE — 99443 PR PHYS/QHP TELEPHONE EVALUATION 21-30 MIN: CPT | Performed by: INTERNAL MEDICINE

## 2021-09-29 NOTE — PROGRESS NOTES
Physician Progress Note      PATIENTToniann Merlin  CSN #:                  [id number]  :                       1960  ADMIT DATE:       2021 4:52 PM  [address] [place] DATE:        2021 3:56 PM  RESPONDING  PROVIDER #:        Eduarda Ledesma TEXT:    Patient admitted with hypoglycemia. Noted in discharge summary documentation   of hypoglycemia after GI surgery and in the hospital course note, \"brought   into the ED by EMS on account of hypoglycemia following administration of   insulin. \" If possible, please document in progress notes and discharge summary   if you are evaluating and /or treating any of the following: The medical record reflects the following:  Risk Factors: DM insulin dependent, unspecified abdominal surgery in history  Clinical Indicators: EMS transport with family notification of \"low blood   sugar\" reading. Treatment: D50 1 amp per EMS, D10 drip per EMS into ER, D50 amp in ER, D5 1/2   NS @ 50 m hr. Options provided:  -- Hypoglycemia following administration of insulin confirmed and hypoglycemia   after GI surgery ruled out  -- Hypoglycemia after GI surgery confirmed and hypoglycemia following   administration of insulin ruled out  -- Other - I will add my own diagnosis  -- Disagree - Not applicable / Not valid  -- Disagree - Clinically unable to determine / Unknown  -- Refer to Clinical Documentation Reviewer    PROVIDER RESPONSE TEXT:    After study, hypoglycemia following administration of insulin confirmed and   hypoglycemia after GI surgery ruled out.     Query created by: Kate Trevizo on 2021 5:40 PM      Electronically signed by:  Javier Paniagua 2021 7:40 PM

## 2021-09-29 NOTE — PROGRESS NOTES
MEDICAL ONCOLOGY PROGRESS NOTE    Pt Name: Maryuri Arguello  MRN: [id number]  YOB: 1960  Date of evaluation: 9/29/2021    HISTORY OF PRESENT ILLNESS:    This is a telephone visit on 9/29/2021. The patient was recently hospitalized. He was found to have disease progression of his colonic malignancy. I was consulted in the hospital but he was discharged before seen by me. Unfortunately, the patient performance status has declined significantly. He needs help with basic ADLs, such as transferring, bathing. He is very weak overall and has spent most of the time in bed. He requested the visit today to be a virtual visit due to him being very weak to come to the clinic. He was present during the call along with his family members. We discussed results of his his scans and blood test from his last hospitalization. I reviewed prior medical records and notes from the hospital.  He was seen by palliative care in the hospital who discussed with him about hospice. The patient opted to follow-up with me today for further decision. Diagnosis  · Metastatic colonic adenocarcinoma, August 2017  · pT3N2a, stage IIIB  · Pulmonary embolism, May 2019  · K-michael mutated  · MSI stable  · PIK3CA mutated  · Liver metastasis, January 2018     Treatment summary  · Modified FOLFOX 6  · 5-FU/leucovorin/Avastin time 32 cycles (progressive disease and developed pulmonary bleeds)  · SBRT right upper lobe lung metastasis  · FOLFIRI        Target lesions  · Liver metastasis  · Right upper lobe/left lower lobe lung metastasis  · Adrenal metastasis bilaterally     Cancer History  Nicholas Zazueta was first seen by Dr. Robyn Solorzano on 5/31/2021. Sofia Carrasco is a pleasant 64years old male who is a patient of Dr. Dixie Sharma oncologist at [address] [place] [address] in [facility].    Patient was initially diagnosed in July 2017 when he was hospitalized for colitis.  CT abdomen showed thickening of the lower right colon.  A colonoscopy was performed in August 2017 that showed a large fungating mass in the cecum and proximal ascending colon.  Biopsy was consistent with invasive carcinoma with mucinous features.  Staging CT scans showed a left lower lobe micronodule measuring 3 mm and a 1.6 cm left adrenal nodule in September 2017.  The patient underwent a laparoscopic open right colectomy, partial omentectomy and partial peritonectomy on 10/17/2017.  Pathology consistent with differentiated mucinous adenocarcinoma involving the cecal pouch, ileocecal valve and extending intraluminally into the terminal ileus.  For tumor deposits present.  Final pathologic staging consistent with K-michael mutated, for positive lymph nodes [id number], stage IIIB colon cancer.  He received adjuvant modified FOLFOX 6 started November 2017.  Unfortunately biopsy-proven metastatic disease to the liver documented in January 2018.  Avastin was added to his regimen.  He completed 10 cycles of oxaliplatin which was discontinued.  Avastin was added on for cycle #5.  He then completed 32 cycles of 5-FU/Avastin.  He developed pulmonary embolism and was started on Lovenox and later on switched to Eliquis in August 2019.  In July 2020 he had disease progression and was started on FOLFIRI. He received hyperfractionated RT to left lower lobe nodule July 2019. Lena Aguirre was last seen by his oncologist on 05/12/2021. Matt Soto was considering moving care to [facility] where he lives with his daughter. Emani Hopkins his last chemotherapy was in November 2020 and the patient was lost to follow-up. Lena Aguirre was seen again in February 2021 and received a cycle of FOLFIRI.  Poor compliance to his medical visit with his oncologist due to several logistic problems.   · 3/5/2021-CT chest performed at the [address] [place] [address] showed the previously seen right upper lobe mass has significantly enlarged currently measuring 5.0 x 5.5 x 6.9 cm and extending into and at some levels is indistinct from the right bronchovascular structures with also some narrowing of the pulmonary arteries of this region. Noreene Shouts are multiple small satellite nodular opacities surrounding this mass. The previously seen left lower lobe mass has significantly enlarged currently measuring 5.0 x 4.7 x 4.7 cm. Noreene Shouts is a newly developed small left lower lobe nodule measuring 7.5 mm.   · 3/5/2021-CT abdomen pelvis showed bilateral adrenal nodules with a left adrenal nodule measuring 3.9 x 1.6 cm.  The right adrenal nodule measured 1.8 cm.  Subcentimeter low-density liver lesions and additional subcentimeter hyperenhancing focus in the liver, change.  Possible wall thickening of the distal colon and rectum.  Status post right hemicolectomy. · 5/12/2021-he was last seen by his oncologist.  Patient plans to move care to [facility]. · 5/14/2021-last infusion of palliative FOLFIRI cycle #3  · 5/30/2021-the patient presented to the ED department at [facility] with complaints of increased short of breath over the last past month.  He also had productive cough with greenish sputum.  Denied any fever or chills. · 5/30/2001-CT pulmonary protocol showed no evidence of pulmonary embolism but presence of lung metastasis. Right upper lobe lung metallic mesh 6.8 x 4.8 cm with extensive airway and vascular encasement. Direct extension into the mediastinum with partial encasement and apparently direct invasion of the right mainstem bronchus and bronchus intermedius. Intraluminal soft tissue and focal severe narrowing. Tree-in-bud nodularity throughout the right lower and right middle lobe is present. Left lower lobe 5.5 x 5 cm mass. Enlarged mediastinal and bilateral hilar lymph nodes. · 6/1/2021-CEA 33.7  · 6/9/2021-cycle #4 of FOLFIRI  · 9/17/21 CT cervical spine/head: CT head. No acute intracranial abnormalities. CT cervical spine. No acute osseous posttraumatic findings.  Complete opacification of the visualized right upper lobe.   · 9/19/21 CT chest: Progression of neoplastic disease compared with 4 months ago. Right hilar mass produces right bronchial obstruction with complete consolidation/collapse of the right lung. Drowned lung appearance with moderate right pleural effusion. · 9/29/2021-telehealth visit. Patient performance status has declined significantly. He is not a candidate for further anticancer therapy at this time due to his very poor performance status. Discussed best supportive care options and I have strongly recommended him hospice. Past Medical History:    Past Medical History:   Diagnosis Date    Cancer ([facility] [address]     colon    Diabetes mellitus ([facility] [address]     Essential hypertension 5/6/2019    Hyperlipidemia 7/26/2017    Last Assessment & Plan:  Formatting of this note might be different from the original. On Lipitor    Palliative care patient 06/01/2021       Past Surgical History:    Past Surgical History:   Procedure Laterality Date    ABDOMEN SURGERY      COLONOSCOPY      ENDOSCOPY, COLON, DIAGNOSTIC      TUNNELED VENOUS PORT PLACEMENT         Social History:    Marital status: , 4 children  Smoking status: former smoker, quit 6 months ago, 1ppd for 40 years  ETOH status: not currently  Resides: [place], [place]    Family History:   Family History   Problem Relation Age of Onset    Heart Attack Father     Heart Attack Maternal Grandfather     Heart Attack Paternal Grandfather        Current Hospital Medications:    Current Outpatient Medications   Medication Sig Dispense Refill    cyclobenzaprine (FLEXERIL) 5 MG tablet Take 1 tablet by mouth 3 times daily as needed for Muscle spasms 15 tablet 0    hydroCHLOROthiazide (HYDRODIURIL) 25 MG tablet Take 25 mg by mouth daily      HYDROcodone-acetaminophen (NORCO) 5-325 MG per tablet Take 1 tablet by mouth every 4 hours as needed.       mirtazapine (REMERON) 7.5 MG tablet Take 7.5 mg by mouth      prochlorperazine (COMPAZINE) 10 MG tablet Take 10 mg by mouth every 6 hours as needed      albuterol sulfate HFA (VENTOLIN HFA) 108 (90 Base) MCG/ACT inhaler Inhale 2 puffs into the lungs every 6 hours as needed for Wheezing      apixaban (ELIQUIS) 5 MG TABS tablet Take by mouth 2 times daily      ipratropium-albuterol (DUONEB) 0.5-2.5 (3) MG/3ML SOLN nebulizer solution Inhale 3 mLs into the lungs every 4 hours (while awake) 100 mL 0    folic acid (FOLVITE) 1 MG tablet Take 1 tablet by mouth daily 30 tablet 0    Nebulizers (COMPRESSOR/NEBULIZER) MISC 1 vial by Does not apply route 4 times daily 1 each 0     No current facility-administered medications for this visit. Allergies: Allergies   Allergen Reactions    Varenicline      Other reaction(s): Anger and Agression   -          Subjective   REVIEW OF SYSTEMS:   CONSTITUTIONAL: no fever, no night sweats, fatigue; generalized weakness, decreased mobility  HEENT: no blurring of vision, no double vision, no hearing difficulty, no tinnitus, no ulceration, no dysplasia, no epistaxis;  LUNGS: no cough, no hemoptysis, no wheeze,  shortness of breath;  CARDIOVASCULAR: no palpitation, no chest pain, shortness of breath;  GI: Inappetence no abdominal pain, no nausea, no vomiting, no diarrhea, no constipation;  ALVIN: no dysuria, no hematuria, no frequency or urgency, no nephrolithiasis;  MUSCULOSKELETAL: no joint pain, no swelling, no stiffness;  ENDOCRINE: no polyuria, no polydipsia, no cold or heat intolerance;  HEMATOLOGY: no easy bruising or bleeding, no history of clotting disorder;      Objective   There were no vitals taken for this visit. Telehealth visit  This was a telephone visit. No vital signs available      LABORATORY RESULTS REVIEWED/ANALYZED BY ME:  none  RADIOLOGY STUDIES REVIEWED BY ME:  CT HEAD WO CONTRAST    Result Date: 9/17/2021  EXAM: CT OF THE HEAD WITHOUT IV CONTRAST CT CERVICAL SPINE WITHOUT IV CONTRAST 9/17/2021 COMPARISON: None.  INDICATION: Headache, fall PROCEDURE: Non contrast enhanced head CT and cervical spine CT were performed. The head images were formatted in the axial plane at 5 mm thick intervals. The cervical spine images were formatted in the axial, coronal and sagittal planes. Radiation dose equals DLP 1426 mGy-cm. Automated exposure control dose reduction technique was implemented. FINDINGS: HEAD: Mild generalized volume loss. Mild chronic microvascular changes. . There is no evidence of mass-effect or midline shift. The gray-white differentiation is preserved. There is no evidence of intracranial contusion, hemorrhage, or skull fracture. Mastoid air cells are clear. Nonobstructive paranasal sinus mucosal disease. CERVICAL SPINE: The odontoid process is well approximated with the anterior body of C1 and well aligned between the lateral masses of C1. There is no spondylolisthesis. No acute compression deformity. No dislocation. Multilevel degenerative changes, with distances and facet arthropathy. Complete opacification of the visualized right upper lobe. CT head. No acute intracranial abnormalities. CT cervical spine. 1.  No acute osseous posttraumatic findings. 2.  Complete opacification of the visualized right upper lobe. Signed by Dr Rivero Vik    Result Date: 9/19/2021  CT CHEST W CONTRAST 9/19/2021 12:26 PM History: Stage IV colon cancer. Right lung consolidation/collapse. In order to have a CT radiation dose as low as reasonably achievable Automated Exposure Control was utilized for adjustment of the mA and/or KV according to patient size. DLP in mGycm= 825. Postcontrast chest CT. Comparison is made with chest CT from May 30, 2021. A low-density and necrotic appearing right hilar lung mass previously measured 68 x 48 x 63 mm and now measures 69 x 46 x 96 mm. There is associated obstruction of the right bronchus with complete right lung consolidation and partial collapse. The right pleural spaces filled with a moderate amount of fluid.  There is no aerated portion of the right lung. The left lung is hyperexpanded. No pneumothorax. Low density left hilar lymphadenopathy measures 33 x 17 mm compared with 17 x 12 mm. Low density mass within the posterior left lower lobe measures 67 x 71 mm compared with 55 x 50 mm. Adjacent left lower lobe nodule measures 13 mm compared with 9 mm. Small amount of left pleural fluid. Bilateral low density adrenal masses have increased in size. A low-density mass within the caudate lobe of the liver measures 43 x 32 mm. 1. Progression of neoplastic disease compared with 4 months ago. 2. Right hilar mass produces right bronchial obstruction with complete consolidation/collapse of the right lung. Drowned lung appearance with moderate right pleural effusion. Signed by Dr Pepe Russell    Result Date: 9/17/2021  EXAM: CT OF THE HEAD WITHOUT IV CONTRAST CT CERVICAL SPINE WITHOUT IV CONTRAST 9/17/2021 COMPARISON: None. INDICATION: Headache, fall PROCEDURE: Non contrast enhanced head CT and cervical spine CT were performed. The head images were formatted in the axial plane at 5 mm thick intervals. The cervical spine images were formatted in the axial, coronal and sagittal planes. Radiation dose equals DLP 1426 mGy-cm. Automated exposure control dose reduction technique was implemented. FINDINGS: HEAD: Mild generalized volume loss. Mild chronic microvascular changes. . There is no evidence of mass-effect or midline shift. The gray-white differentiation is preserved. There is no evidence of intracranial contusion, hemorrhage, or skull fracture. Mastoid air cells are clear. Nonobstructive paranasal sinus mucosal disease. CERVICAL SPINE: The odontoid process is well approximated with the anterior body of C1 and well aligned between the lateral masses of C1. There is no spondylolisthesis. No acute compression deformity. No dislocation.  Multilevel degenerative changes, with distances and facet arthropathy. Complete opacification of the visualized right upper lobe. CT head. No acute intracranial abnormalities. CT cervical spine. 1.  No acute osseous posttraumatic findings. 2.  Complete opacification of the visualized right upper lobe. Signed by Dr Peyton Hope    XR CHEST PORTABLE    Result Date: 9/17/2021  Exam:   XR CHEST PORTABLE  Date:  9/17/2021 History:  Male, age  63 years; fall COMPARISON:  Chest x-ray dated May 30, 2021. Findings : Right-sided chest port in place. The right heart is indistinct. There is mediastinal shift to the right. Complete opacification of the right hemithorax suspected to reflect right lung collapse. .  The bones show no acute pathology. Impression: Complete opacification of the right hemithorax, suspected Lung collapse given the history of prior right perihilar mass. Signed by Dr Flower Waterman:    No orders of the defined types were placed in this encounter. Bari Skiff was seen today for follow-up. Diagnoses and all orders for this visit:    Primary colon cancer with metastasis to other site [facility])    Counseling regarding advanced care planning and goals of care    Palliative care patient    Poor prognosis       #Bilateral lung metastasis  Progression of malignancy based on the most recent CT scans. CT scans reviewed.     #Colon cancer (liver metastasis, adrenal metastasis, lung metastasis, retroperitoneal node metastasis) K-michael mutated, MSI stable  Status post modified FOLFOX x10 cycles with Avastin added on for cycle 5  Maintenance 5-FU/Avastin for 24 cycles.  Recurrent disease March 2021. Currently on FOLFIRI  CEA 33.7 on 6/1/2021 6/9/2021-cycle #4 FOLFIRI. Essentially, the patient has significant disease progression on the most recent CT scans. Very poor performance status. Patient could not attend the visit today. He requested telehealth visit. Discussed with the patient at length and also family members. He has been very weak.   He needs help from family members for basic ADLs. After a lengthy discussion of results of CT scans, review of medical records I have strongly recommended enrollment with hospice. The patient acknowledged understanding. They have seen palliative care in the hospital and were given further instructions to call them back after visit today. PLAN:  I agree with hospice referral.    (Please note that portions of this note were completed with a voice recognition program. Efforts were made to edit the dictations but occasionally words are mis-transcribed.)  Peyton Mosquera is a 63 y.o. male evaluated via telephone on 9/29/2021. Consent:  He and/or health care decision maker is aware that that he may receive a bill for this telephone service, depending on his insurance coverage, and has provided verbal consent to proceed: Yes      Documentation:  I communicated with the patient and/or health care decision maker about as above. Details of this discussion including any medical advice provided: as above      I affirm this is a Patient Initiated Episode with a Patient who has not had a related appointment within my department in the past 7 days or scheduled within the next 24 hours. Patient identification was verified at the start of the visit: Yes    Total Time: minutes: 21-30 minutes    The visit was conducted pursuant to the emergency declaration under the [address] [place] [address] authority and the [employer] Act. Patient identification was verified, and a caregiver was present when appropriate. The patient was located in a state where the provider was credentialed to provide care.     Note: not billable if this call serves to triage the patient into an appointment for the relevant concern      MD Radha Dent MD    09/29/21  3:39 PM